# Patient Record
Sex: FEMALE | Race: BLACK OR AFRICAN AMERICAN | Employment: FULL TIME | URBAN - METROPOLITAN AREA
[De-identification: names, ages, dates, MRNs, and addresses within clinical notes are randomized per-mention and may not be internally consistent; named-entity substitution may affect disease eponyms.]

---

## 2017-11-06 ENCOUNTER — DOCUMENTATION ONLY (OUTPATIENT)
Dept: SURGERY | Age: 37
End: 2017-11-06

## 2017-11-06 ENCOUNTER — CLINICAL SUPPORT (OUTPATIENT)
Dept: SURGERY | Age: 37
End: 2017-11-06

## 2017-11-06 ENCOUNTER — OFFICE VISIT (OUTPATIENT)
Dept: SURGERY | Age: 37
End: 2017-11-06

## 2017-11-06 ENCOUNTER — HOSPITAL ENCOUNTER (OUTPATIENT)
Dept: LAB | Age: 37
Discharge: HOME | End: 2017-11-06
Payer: OTHER GOVERNMENT

## 2017-11-06 VITALS — HEIGHT: 58 IN | WEIGHT: 229 LBS | BODY MASS INDEX: 48.07 KG/M2

## 2017-11-06 VITALS
DIASTOLIC BLOOD PRESSURE: 83 MMHG | WEIGHT: 229 LBS | SYSTOLIC BLOOD PRESSURE: 130 MMHG | RESPIRATION RATE: 16 BRPM | HEIGHT: 58 IN | OXYGEN SATURATION: 100 % | BODY MASS INDEX: 48.07 KG/M2 | HEART RATE: 60 BPM

## 2017-11-06 DIAGNOSIS — K30 FUNCTIONAL DYSPEPSIA: ICD-10-CM

## 2017-11-06 DIAGNOSIS — E66.01 MORBID OBESITY WITH BODY MASS INDEX (BMI) OF 40.0 TO 49.9 (HCC): ICD-10-CM

## 2017-11-06 DIAGNOSIS — Z86.010 HISTORY OF COLON POLYPS: ICD-10-CM

## 2017-11-06 DIAGNOSIS — K59.00 CONSTIPATION, UNSPECIFIED CONSTIPATION TYPE: ICD-10-CM

## 2017-11-06 DIAGNOSIS — E66.01 MORBID OBESITY WITH BODY MASS INDEX (BMI) OF 40.0 TO 49.9 (HCC): Primary | ICD-10-CM

## 2017-11-06 DIAGNOSIS — E66.01 MORBID OBESITY (HCC): Primary | ICD-10-CM

## 2017-11-06 PROCEDURE — 83013 H PYLORI (C-13) BREATH: CPT | Performed by: SPECIALIST

## 2017-11-06 RX ORDER — BISMUTH SUBSALICYLATE 262 MG
1 TABLET,CHEWABLE ORAL DAILY
COMMUNITY
End: 2018-03-16

## 2017-11-06 RX ORDER — PSYLLIUM HUSK 0.4 G
CAPSULE ORAL
COMMUNITY
End: 2018-03-16

## 2017-11-06 RX ORDER — LANOLIN ALCOHOL/MO/W.PET/CERES
CREAM (GRAM) TOPICAL
COMMUNITY
End: 2018-03-16

## 2017-11-06 RX ORDER — POLYETHYLENE GLYCOL 3350 17 G/17G
17 POWDER, FOR SOLUTION ORAL DAILY
COMMUNITY
End: 2018-03-16

## 2017-11-06 NOTE — PROGRESS NOTES
Medical Weight Loss Multi-Disciplinary Program    Name: Tita Leal   : 1980    Session# 1  Date: 2017     Height: 4' 10\" (147.3 cm)    Weight: 103.9 kg (229 lb) lbs. Body mass index is 47.86 kg/(m^2). Pounds Lost: 0 Pounds Gained: 0    Dietary Instructions    Reviewed intake  Understanding label reading  Understanding low carbohydrates, low sugar, higher protein meals  Understanding proper portions  Dining outside home  Instruction given for personal dietary changes  Discussed perceived compliance  Comments: Pt given brief pre/post-op diet ed and diet hx reviewed. Physical Activity/Exercise    Discussed Perceived Compliance  Reasonable Goals Set  Motivation  Comments: Pt does not have an exercise routine, but is a  and walks 5 days a week the entire day     Behavior Modification    Positive attitude  Comments: Pt is working on the following goals:    Goals:   1. Work on eating three meals per day; no skipping meals - eating within 2 hours of waking up -- can use a protein shake as a meal replacement or snack   2. Work on  out eating and drinking waiting 30 minutes between each  3. Work on decreasing carbohydrates keeping it to 50 grams or less per day     Candidate for surgery (per RD): Pending    Dietitian: Lucy Ram is a 44 y.o. female who present for a pre-op evaluation. Visit Vitals    Ht 4' 10\" (1.473 m)    Wt 103.9 kg (229 lb)    BMI 47.86 kg/m2     Past Medical History:   Diagnosis Date    Constipation     Functional dyspepsia     uses OTC meds    History of colon polyps     Morbid obesity (HCC)     Morbid obesity with body mass index (BMI) of 40.0 to 49.9 (HCC)            Procedure:  laparoscopic sleeve gastrectomy     Reasons for Surgery:  BMI > 40 with one or more medically significant comorbidities    Summary:  Pt given brief pre/post-op diet ed and diet hx reviewed. Pt set several goals. See below.      Current Vitamins: MVI, Iron, Vitamin C    What have you done in the past to try to lose weight? Sarah, cabbage soup diet, taken garcenia, has done no bread and watching sugars, diet pill    Why didn't you lose weight or keep the weight off?: Pt was not able to lose the weight because she was not seeing results fast enough, so she would quit. Also thinks consistency was also a factor which made her stop each diet    Why do you think having weight loss surgery will make it possible for you to lose weight and keep it off? Feels is she can financially afford it it will make it easier for her to stay with a diet and lose the weight andmaintain that weight loss       Patient Education and Materials Provided:  Supplement [facility], B Vitamin Information, MVI Recommendations, Calcium Citrate Information, Bariatric Supplement Companies, Protein Supplement Information, Fluid Requirements, No Caffeine or Carbonation, No Alcohol for One Year Post Op, 3 Balanced Meals a Day, Food Group Guide, Good Choices Dining Out, No Snacks, No Concentrated Sweets, Support System at Home, Exercising, Support Group Information and Addressed Current Habits / Changes to make    Nutritional Hx: What is the number of meals you eat per day? Definitely 2  Comment: typically skips breakfast because she's busy     Do you eat between meals / snack? Not usually  Typical snack: N/A    How fast do you eat your meals? slow    How many sodas/sugared beverages do you drink per day? No often - If she has pasta she'll have sprite or 7-up    How many caffeinated drinks do you have per day? None     How much water do you drink per day? 3 16.9-ounce bottles     How often do you eat fast food? occasionally    How often do you consume alcohol? never;     Diet History:  Breakfast  What are you eating and how much? Typically skips breakfast    When? ii   Where? iii   Snacks  What are you eating and how much?  None    When? ii   Where? iii   Hydration  What are you eating and how much? water   When? ii   Where? ii   Lunch  What are you eating and how much? Leftovers from dinner - maybe pasta, gumbo   When? ii   Where? iii   Snacks  What are you eating and how much? None    When? ii   Where? iii   Hydration  What are you eating and how much? water   When? ii   Where? iii   Dinner  What are you eating and how much? Gumbo, pasta, spaghetti, always has a vegetable, fast food or out to dinner    When? ii   Where? iii   Snacks  What are you eating and how much? Cake, cookies    When? ii   Where? iii   Hydration  What are you eating and how much? Water or an occasional soda    When? ii   Where? iii     Exercise:  Do you currently have an exercise routine? yes  What kind of exercise do you do? walking . For how long? throughout the day (pt. is a ) For how often? 5 days per week    Goals:   1. Work on eating three meals per day; no skipping meals - eating within 2 hours of waking up -- can use a protein shake as a meal replacement or snack   2. Work on  out eating and drinking waiting 30 minutes between each  3.  Work on decreasing carbohydrates keeping it to 50 grams or less per day

## 2017-11-06 NOTE — MR AVS SNAPSHOT
Visit Information Date & Time Provider Department Dept. Phone Encounter #  
 11/6/2017 10:40 AM MD [facility] [phone] [id number] Your Appointments 11/6/2017  1:00 PM  
NUTRITION COUNSELING with TSS NUTR VISIT2 [facility] ([address]) Appt Note: 1 of 3  
 [address] Upcoming Health Maintenance Date Due DTaP/Tdap/Td series (1 - Tdap) 1/9/2000 PAP AKA CERVICAL CYTOLOGY 1/9/2000 INFLUENZA AGE 9 TO ADULT 8/1/2017 Allergies as of 11/6/2017  Review Complete On: 11/6/2017 By: Catalina Jameson MD  
  
 Severity Noted Reaction Type Reactions Morphine  11/06/2017    Hives, Itching Current Immunizations  Never Reviewed No immunizations on file. Not reviewed this visit You Were Diagnosed With   
  
 Codes Comments [facility]    -  Primary ICD-10-CM: E66.01 
ICD-9-CM: 278.01 Functional dyspepsia     ICD-10-CM: K30 ICD-9-CM: 536.8 Constipation, unspecified constipation type     ICD-10-CM: K59.00 ICD-9-CM: 564.00 Morbid obesity with body mass index (BMI) of 40.0 to 49.9 (HCC)     ICD-10-CM: E66.01 
ICD-9-CM: 278.01 History of colon polyps     ICD-10-CM: Z86.010 
ICD-9-CM: V12.72 Vitals BP Pulse Resp Height(growth percentile) Weight(growth percentile) SpO2  
 130/83 (BP 1 Location: Left arm, BP Patient Position: Sitting) 60 16 4' 10\" (1.473 m) 229 lb (103.9 kg) 100% BMI OB Status Smoking Status 47.86 kg/m2 Having regular periods Never Smoker Vitals History BMI and BSA Data Body Mass Index Body Surface Area  
 47.86 kg/m 2 2.06 m 2 Your Updated Medication List  
  
   
This list is accurate as of: 11/6/17 12:20 PM.  Always use your most recent med list.  
  
 Iron 325 mg (65 mg iron) tablet Generic drug:  ferrous sulfate Take  by mouth Daily (before breakfast). METAMUCIL 0.4 gram Cap Generic drug:  psyllium husk Take  by mouth. MIRALAX 17 gram packet Generic drug:  polyethylene glycol Take 17 g by mouth daily. multivitamin tablet Commonly known as:  ONE A DAY Take 1 Tab by mouth daily. VITAMIN C PO Take  by mouth. To-Do List   
 11/06/2017 Lab:  CBC WITH AUTOMATED DIFF   
  
 11/06/2017 Lab:  H. PYLORI BREATH TEST   
  
 11/06/2017 Lab:  METABOLIC PANEL, COMPREHENSIVE   
  
 11/06/2017 Lab:  TSH 3RD GENERATION Patient Instructions Body Mass Index: Care Instructions Your Care Instructions Body mass index (BMI) can help you see if your weight is raising your risk for health problems. It uses a formula to compare how much you weigh with how tall you are. · A BMI lower than 18.5 is considered underweight. · A BMI between 18.5 and 24.9 is considered healthy. · A BMI between 25 and 29.9 is considered overweight. A BMI of 30 or higher is considered obese. If your BMI is in the normal range, it means that you have a lower risk for weight-related health problems. If your BMI is in the overweight or obese range, you may be at increased risk for weight-related health problems, such as high blood pressure, heart disease, stroke, arthritis or joint pain, and diabetes. If your BMI is in the underweight range, you may be at increased risk for health problems such as fatigue, lower protection (immunity) against illness, muscle loss, bone loss, hair loss, and hormone problems. BMI is just one measure of your risk for weight-related health problems. You may be at higher risk for health problems if you are not active, you eat an unhealthy diet, or you drink too much alcohol or use tobacco products. Follow-up care is a key part of your treatment and safety.  Be sure to make and go to all appointments, and call your doctor if you are having problems. It's also a good idea to know your test results and keep a list of the medicines you take. How can you care for yourself at home? · Practice healthy eating habits. This includes eating plenty of fruits, vegetables, whole grains, lean protein, and low-fat dairy. · If your doctor recommends it, get more exercise. Walking is a good choice. Bit by bit, increase the amount you walk every day. Try for at least 30 minutes on most days of the week. · Do not smoke. Smoking can increase your risk for health problems. If you need help quitting, talk to your doctor about stop-smoking programs and medicines. These can increase your chances of quitting for good. · Limit alcohol to 2 drinks a day for men and 1 drink a day for women. Too much alcohol can cause health problems. If you have a BMI higher than 25 · Your doctor may do other tests to check your risk for weight-related health problems. This may include measuring the distance around your waist. A waist measurement of more than 40 inches in men or 35 inches in women can increase the risk of weight-related health problems. · Talk with your doctor about steps you can take to stay healthy or improve your health. You may need to make lifestyle changes to lose weight and stay healthy, such as changing your diet and getting regular exercise. If you have a BMI lower than 18.5 · Your doctor may do other tests to check your risk for health problems. · Talk with your doctor about steps you can take to stay healthy or improve your health. You may need to make lifestyle changes to gain or maintain weight and stay healthy, such as getting more healthy foods in your diet and doing exercises to build muscle. Where can you learn more? Go to http://jorge-prisca.info/. Enter S176 in the search box to learn more about \"Body Mass Index: Care Instructions. \" 
 Current as of: October 13, 2016 Content Version: 11.4 © 8684-4036 Healthwise, [employer]. Care instructions adapted under license by [employer] (which disclaims liability or warranty for this information). If you have questions about a medical condition or this instruction, always ask your healthcare professional. [address] any warranty or liability for your use of this information. Introducing [facility]! Barragan Virgil introduces [employer] patient portal. Now you can access parts of your medical record, email your doctor's office, and request medication refills online. 1. In your internet browser, go to [email or web address] 2. Click on the First Time User? Click Here link in the Sign In box. You will see the New Member Sign Up page. 3. Enter your [employer] Access Code exactly as it appears below. You will not need to use this code after youve completed the sign-up process. If you do not sign up before the expiration date, you must request a new code. · [employer] Access Code: [email or web address] Expires: 2/4/2018 11:20 AM 
 
4. Enter the last four digits of your Social Security Number (xxxx) and Date of Birth (mm/dd/yyyy) as indicated and click Submit. You will be taken to the next sign-up page. 5. Create a A[employer]ha ID. This will be your [employer]ha login ID and cannot be changed, so think of one that is secure and easy to remember. 6. Create a A[employer]ha password. You can change your password at any time. 7. Enter your Password Reset Question and Answer. This can be used at a later time if you forget your password. 8. Enter your e-mail address. You will receive e-mail notification when new information is available in [address] 9. Click Sign Up. You can now view and download portions of your medical record. 10. Click the Download Summary menu link to download a portable copy of your medical information. If you have questions, please visit the Frequently Asked Questions section of the [employer] website. Remember, [email or web address].me is NOT to be used for urgent needs. For medical emergencies, dial 911. Now available from your iPhone and Android! Please provide this summary of care documentation to your next provider. If you have any questions after today's visit, please call [phone].

## 2017-11-06 NOTE — PATIENT INSTRUCTIONS
Goals: 1. Work on eating three meals per day; no skipping meals - eating within 2 hours of waking up -- can use a protein shake as a meal replacement or snack   2. Work on  out eating and drinking waiting 30 minutes between each  3.  Work on decreasing carbohydrates keeping it to 50 grams or less per day

## 2017-11-06 NOTE — PATIENT INSTRUCTIONS
Body Mass Index: Care Instructions  Your Care Instructions    Body mass index (BMI) can help you see if your weight is raising your risk for health problems. It uses a formula to compare how much you weigh with how tall you are. · A BMI lower than 18.5 is considered underweight. · A BMI between 18.5 and 24.9 is considered healthy. · A BMI between 25 and 29.9 is considered overweight. A BMI of 30 or higher is considered obese. If your BMI is in the normal range, it means that you have a lower risk for weight-related health problems. If your BMI is in the overweight or obese range, you may be at increased risk for weight-related health problems, such as high blood pressure, heart disease, stroke, arthritis or joint pain, and diabetes. If your BMI is in the underweight range, you may be at increased risk for health problems such as fatigue, lower protection (immunity) against illness, muscle loss, bone loss, hair loss, and hormone problems. BMI is just one measure of your risk for weight-related health problems. You may be at higher risk for health problems if you are not active, you eat an unhealthy diet, or you drink too much alcohol or use tobacco products. Follow-up care is a key part of your treatment and safety. Be sure to make and go to all appointments, and call your doctor if you are having problems. It's also a good idea to know your test results and keep a list of the medicines you take. How can you care for yourself at home? · Practice healthy eating habits. This includes eating plenty of fruits, vegetables, whole grains, lean protein, and low-fat dairy. · If your doctor recommends it, get more exercise. Walking is a good choice. Bit by bit, increase the amount you walk every day. Try for at least 30 minutes on most days of the week. · Do not smoke. Smoking can increase your risk for health problems. If you need help quitting, talk to your doctor about stop-smoking programs and medicines. These can increase your chances of quitting for good. · Limit alcohol to 2 drinks a day for men and 1 drink a day for women. Too much alcohol can cause health problems. If you have a BMI higher than 25  · Your doctor may do other tests to check your risk for weight-related health problems. This may include measuring the distance around your waist. A waist measurement of more than 40 inches in men or 35 inches in women can increase the risk of weight-related health problems. · Talk with your doctor about steps you can take to stay healthy or improve your health. You may need to make lifestyle changes to lose weight and stay healthy, such as changing your diet and getting regular exercise. If you have a BMI lower than 18.5  · Your doctor may do other tests to check your risk for health problems. · Talk with your doctor about steps you can take to stay healthy or improve your health. You may need to make lifestyle changes to gain or maintain weight and stay healthy, such as getting more healthy foods in your diet and doing exercises to build muscle. Where can you learn more? Go to http://jorge-prisca.info/. Enter S176 in the search box to learn more about \"Body Mass Index: Care Instructions. \"  Current as of: October 13, 2016  Content Version: 11.4  © 3434-8411 Healthwise, Incorporated. Care instructions adapted under license by [employer] (which disclaims liability or warranty for this information). If you have questions about a medical condition or this instruction, always ask your healthcare professional. [address] any warranty or liability for your use of this information.

## 2017-11-06 NOTE — PROGRESS NOTES
Bariatric Surgery Consultation    Subjective: The patient is a 44 y.o. obese female with a Body mass index is 47.86 kg/(m^2). John Florence The patient is currently her heaviest weight for the past   several years. she has been overweight since her teen years. she has been considering surgery since last year. she desires surgery   at this time because of multiple health concerns and their lifestyle issues which are hindered by their weight. she has been referred by   his family physician for evaluation and treatment of their obesity via surgical intervention. Parvez Lunsford has tried multiple diets   in her lifetime most recently tried behavior modification and unsupervised diets    Bariatric comorbidities present are   Patient Active Problem List   Diagnosis Code    Morbid obesity ([facility] [address] E66.01    Morbid obesity with body mass index (BMI) of 40.0 to 49.9 ([facility]) E66.01    Constipation K59.00    Functional dyspepsia K30    History of colon polyps Z86.010       The patient is considering laparoscopic sleeve gastrectomy for surgical weight loss due to their ineffective progress with medical forms   of weight loss and the urging of their physician who cares for their primary medical issues. The patient  now presents  for consideration   for weight loss surgery understanding the benefits of this over a medical approach of weight loss as was discussed in our presentation   on weight loss surgery. They have discussed their plans both with their family and primary care physician who is in support of their pursuit   of such. The patient has had no health issues as of late and denies and gastrointestinal disturbances other than what is outlined below in   their review of symptoms. All of their prior evaluations available by both their PCP's and specialists physicians have been reviewed today   either in the Care Everywhere portal or scanned under the media tab.     I have spent a large portion of my initial consultation today reviewing the patients current dietary habits which have contributed to their health   issues and obesity. I have suggested to them personally a dietary regimen that they can initiate now to help with their status as it pertains to their weight. They   understand that the most important aspect of their journey through their weight loss endeavor will be their adherence to a new lifestyle of   healthy eating behavior. They also understand that an adherence to an exercise program will not only help with weight loss but is ultimately   important in weight maintenance. The patients goal weight is 130lb. These goals are consistent with expected outcomes of their desired operation. her Medical goals are resolution of these health issues. Patient Active Problem List    Diagnosis Date Noted    Morbid obesity ([address]     Morbid obesity with body mass index (BMI) of 40.0 to 49.9 ([facility])     Constipation     Functional dyspepsia     History of colon polyps      Past Surgical History:   Procedure Laterality Date    COLONOSCOPY      X 3    DELIVERY       X 3    HX OTHER SURGICAL      scar resection back of head    HX TUBAL LIGATION      IR URETERAL STENT PLACEMENT        Social History   Substance Use Topics    Smoking status: Never Smoker    Smokeless tobacco: Never Used    Alcohol use Yes      Family History   Problem Relation Age of Onset    Hypertension Mother     Obesity Mother     Hypertension Father       Current Outpatient Prescriptions   Medication Sig Dispense Refill    ferrous sulfate (IRON) 325 mg (65 mg iron) tablet Take  by mouth Daily (before breakfast).  ASCORBATE CALCIUM (VITAMIN C PO) Take  by mouth.  polyethylene glycol (MIRALAX) 17 gram packet Take 17 g by mouth daily.  multivitamin (ONE A DAY) tablet Take 1 Tab by mouth daily.  psyllium husk (METAMUCIL) 0.4 gram cap Take  by mouth.        Allergies   Allergen Reactions    Morphine Hives and Itching          Review of Systems:     General - No history or complaints of unexpected fever, chills, or weight loss  Head/Neck - No history or complaints of headache, diplopia, dysphagia, hearing loss  Cardiac - No history or complaints of chest pain, palpitations, murmur, or shortness of breath  Pulmonary - No history or complaints of shortness of breath, productive cough, hemoptysis  Gastrointestinal - (+) reflux, no abdominal pain, obstipation/constipation or blood per rectum  Genitourinary - No history or complaints of hematuria/dysuria, stress urinary incontinence symptoms, or renal lithiasis  Musculoskeletal - mild joint pain in their back and knees,  no muscular weakness  Hematologic - No history or complaints of bleeding disorders,  No blood transfusions  Neurologic - No history or complaints of  migraine headaches, seizure activity, syncopal episodes, TIA or stroke  Integumentary - No history or complaints of rashes, abnormal nevi, skin cancer  Gynecological - unremarkable    Objective:     Visit Vitals    /83 (BP 1 Location: Left arm, BP Patient Position: Sitting)    Pulse 60    Resp 16    Ht 4' 10\" (1.473 m)    Wt 103.9 kg (229 lb)    SpO2 100%    BMI 47.86 kg/m2       Physical Examination: General appearance - alert, well appearing, and in no distress  Mental status - alert, oriented to person, place, and time  Eyes - pupils equal and reactive, extraocular eye movements intact  Ears - bilateral TM's and external ear canals normal  Nose - normal and patent, no erythema, discharge or polyps  Mouth - mucous membranes moist, pharynx normal without lesions  Neck - supple, no significant adenopathy  Lymphatics - no palpable lymphadenopathy, no hepatosplenomegaly  Chest - clear to auscultation, no wheezes, rales or rhonchi, symmetric air entry  Heart - normal rate, regular rhythm, normal S1, S2, no murmurs, rubs, clicks or gallops  Abdomen - soft, nontender, nondistended, no masses or organomegaly  Back exam - full range of motion, no tenderness, palpable spasm or pain on motion  Neurological - alert, oriented, normal speech, no focal findings or movement disorder noted  Musculoskeletal - no joint tenderness, deformity or swelling  Extremities - peripheral pulses normal, no pedal edema, no clubbing or cyanosis  Skin - normal coloration and turgor, no rashes, no suspicious skin lesions noted    Labs:       No results found for this or any previous visit (from the past 1440 hour(s)). Assessment:     Morbid obesity with comorbidity    Plan:     laparoscopic sleeve gastrectomy    This is a 44 y.o. female with a BMI of Body mass index is 47.86 kg/(m^2). and the weight-related co-morbidties as noted below. Gualberto meets the NIH criteria for bariatric surgery based upon the BMI of Body mass index is 47.86 kg/(m^2). and multiple weight-related co-morbidties. Will Hernandez has elected laparoscopic sleeve gastrectomy as her intervention of choice for treatment of morbid obestiy through surgical means secondary to its safety profile, rapid return to work  and decreases in operative risks over gastric bypass. In the office today, following Gualberto's history and physical examination, a 30 minute discussion regarding the anatomic alterations for the laparoscopic sleeve gastrectomy was undertaken. The dietary expectations and the patient and physician dependent factors for success were thoroughly discussed, to include the need for interval follow-up and long-term dietary changes associated with success. The possible complications of the sleeve gastrectomy  were also discussed, to include;death, DVT/PE, staple line leak, bleeding, stricture formation, infection, nutritional deficiencies and sleeve dilation.   Specific weight related outcomes for success were also discussed with an emphasis on careful and close follow-up with the first year and eating behavior modification as the baseline and cyclical hunger return. The patient expressed an understanding of the above factors, and her questions were answered in their entirety. In addition, the patient attended a 1.5 hour power point seminar regarding obesity, surgical weight loss including, adjustable gastric band, gastric bypass, and sleeve gastrectomy. This discussion contrasted the different surgical techniques, mechanisms of actions and expected outcomes, and surgical and medical risks associated with each procedure. During this seminar, there was a long question and answer session where each questions was answered until there were no additional questions. Today, the patient had all of her questions answered and desires to proceed with  bariatric surgery initially choosing sleeve gastrectomy as her surgical option. Secondary Diagnoses:     Dietary Intervention  - The patient is currently scheduled to see or has been followed by a bariatric nutritionist for an attempt at preoperative weight loss as has been dictated by their insurance carrier. They will be assessed at various times during their follow up to evaluate their progress depending on the length of time that is required once again by their carrier. I have explained the importance of preoperative weight loss and the benefits regarding lower surgical risk and also assisting the patient in reaching their weight loss goal.  Finally they understand their is a physiologic benefit from the standpoint of hepatic volume reduction preoperatively.   I have reiterated the importance of a low carbohydrate and high protein regimen to achieve their stated goal.    Mild GERD -The patient understands that weight loss surgery is not a guaranteed cure for reflux disease but does understand the benefits that weight loss can have on reflux disease.  They also understand that at the time of surgery the gastroesophageal junction will be evaluated for the presence of a diaphragmatic hernia.  Hernias will be corrected always with the gastric band and sleeve gastrectomy procedures, but only on a case by case basis with the gastric bypass if it prevents our ability to perform the operation at hand, or if I feel that they would benefit long term with correction of this issue.  The patient also understands that neither weight loss surgery nor repair of a diaphragmatic hernia repair guarantees the complete cessation of the disease. They also understand there is a possibility of recurrence with a simple crural repair as is performed with these procedures. They understand they may have to continue their medications in the postoperative period. They have a good understanding that the gastric bypass procedure is better suited to total resolution of this issue and that neither the Lap Band nor sleeve gastrectomy is considered a curative procedure as it pertains to this diagnosis.         Signed By: Terrence Pineda MD     November 6, 2017

## 2017-11-08 LAB
H. PYLORI BREATH TEST: NEGATIVE
UREA BREATH TEST QL: NORMAL

## 2017-11-29 ENCOUNTER — APPOINTMENT (OUTPATIENT)
Dept: GENERAL RADIOLOGY | Age: 37
End: 2017-11-29
Attending: SPECIALIST
Payer: OTHER GOVERNMENT

## 2017-11-29 ENCOUNTER — HOSPITAL ENCOUNTER (OUTPATIENT)
Age: 37
Setting detail: OUTPATIENT SURGERY
Discharge: HOME | End: 2017-11-29
Attending: SPECIALIST | Admitting: SPECIALIST
Payer: OTHER GOVERNMENT

## 2017-11-29 VITALS
DIASTOLIC BLOOD PRESSURE: 62 MMHG | BODY MASS INDEX: 45.74 KG/M2 | HEART RATE: 64 BPM | SYSTOLIC BLOOD PRESSURE: 131 MMHG | OXYGEN SATURATION: 100 % | RESPIRATION RATE: 18 BRPM | WEIGHT: 226.9 LBS | TEMPERATURE: 97.3 F | HEIGHT: 59 IN

## 2017-11-29 DIAGNOSIS — E66.01 MORBID OBESITY (HCC): ICD-10-CM

## 2017-11-29 PROCEDURE — 76040000019: Performed by: SPECIALIST

## 2017-11-29 PROCEDURE — 74011000255 HC RX REV CODE- 255: Performed by: SPECIALIST

## 2017-11-29 PROCEDURE — 74240 X-RAY XM UPR GI TRC 1CNTRST: CPT

## 2017-11-29 NOTE — IP AVS SNAPSHOT
[address] [address] 
[phone] Patient: Joe Rangel MRN: [id number] :1980 About your hospitalization You were admitted on:  2017 You last received care in the:  THE [facility] ENDOSCOPY You were discharged on:  2017 Why you were hospitalized Your primary diagnosis was:  Not on File Things You Need To Do (next 8 weeks) Wednesday Dec 06, 2017 NUTRITION COUNSELING with [facility] NUTR VISIT2 at 10:30 AM  
Where: [facility] Gisselle Roberts ([facility]) Discharge Orders None A check lillie indicates which time of day the medication should be taken. My Medications ASK your physician about these medications Instructions Each Dose to Equal  
 Morning Noon Evening Bedtime Iron 325 mg (65 mg iron) tablet Generic drug:  ferrous sulfate Your last dose was: Your next dose is: Take  by mouth Daily (before breakfast). METAMUCIL 0.4 gram Cap Generic drug:  psyllium husk Your last dose was: Your next dose is: Take  by mouth. MIRALAX 17 gram packet Generic drug:  polyethylene glycol Your last dose was: Your next dose is: Take 17 g by mouth daily. 17 g  
    
   
   
   
  
 multivitamin tablet Commonly known as:  ONE A DAY Your last dose was: Your next dose is: Take 1 Tab by mouth daily. 1 Tab VITAMIN C PO Your last dose was: Your next dose is: Take  by mouth. Discharge Instructions None Introducing [facility]!    
 [facility] introduces [employer] patient portal. Now you can access parts of your medical record, email your doctor's office, and request medication refills online. 1. In your internet browser, go to [email or web address] 2. Click on the First Time User? Click Here link in the Sign In box. You will see the New Member Sign Up page. 3. Enter your [employer] Access Code exactly as it appears below. You will not need to use this code after youve completed the sign-up process. If you do not sign up before the expiration date, you must request a new code. · [employer] Access Code: [email or web address] Expires: 2/4/2018 11:20 AM 
 
4. Enter the last four digits of your Social Security Number (xxxx) and Date of Birth (mm/dd/yyyy) as indicated and click Submit. You will be taken to the next sign-up page. 5. Create a [employer] ID. This will be your [employer] login ID and cannot be changed, so think of one that is secure and easy to remember. 6. Create a [employer] password. You can change your password at any time. 7. Enter your Password Reset Question and Answer. This can be used at a later time if you forget your password. 8. Enter your e-mail address. You will receive e-mail notification when new information is available in [address] 9. Click Sign Up. You can now view and download portions of your medical record. 10. Click the Download Summary menu link to download a portable copy of your medical information. If you have questions, please visit the Frequently Asked Questions section of the [employer] website. Remember, [employer] is NOT to be used for urgent needs. For medical emergencies, dial 911. Now available from your iPhone and Android! Unresulted Labs-Please follow up with your PCP about these lab tests Order Current Status XR GASTROGRAFFIN UPPER GI In process Providers Seen During Your Hospitalization Provider Specialty Primary office phone Uzair Crook MD General Surgery [phone] Your Primary Care Physician (PCP) Primary Care Physician Office Phone Office Fax HERNANDEZ ** None ** ** None ** You are allergic to the following Allergen Reactions Morphine Hives Itching Recent Documentation Height Weight BMI OB Status Smoking Status 1.486 m 102.9 kg 46.61 kg/m2 Having regular periods Never Smoker Patient Belongings The following personal items are in your possession at time of discharge: 
                             
 
  
  
 Please provide this summary of care documentation to your next provider. Signatures-by signing, you are acknowledging that this After Visit Summary has been reviewed with you and you have received a copy. Patient Signature:  ____________________________________________________________ Date:  ____________________________________________________________  
  
[facility] Provider Signature:  ____________________________________________________________ Date:  ____________________________________________________________

## 2017-11-29 NOTE — PROCEDURES
Patient:Gualberto Haywood   : 1980  Medical Record [id number]            PREPROCEDURE DIAGNOSIS: This patient is preoperative for laparoscopic sleeve gastrectomyprocedure with a history of  reflux disease. POSTPROCEDURE DIAGNOSIS: This patient is preoperative for laparoscopic sleeve gastrectomyprocedure with a history of  reflux disease. PROCEDURES PERFORMED: Upper GI study with barium. ESTIMATED BLOOD LOSS: None. SPECIMENS: None. STATEMENT OF MEDICAL NECESSITY: The patient is a patient with a  longstanding history of obesity. They are now considering the laparoscopic sleeve gastrectomyprocedure as a means of surgical weight control and due to their history of reflux disease and are being assessed preoperatively for such. DESCRIPTION OF PROCEDURE: The patient was brought to the fluoroscopy unit and  was given thin barium. On swallowing of barium, they were noted to have  normal peristalsis of their esophagus. They had prompt filling of distal  esophagus with tapering into the gastroesophageal junction. There was no evidence of a hiatal hernia present. Contrast then filled the gastric cardia, fundus,body and pre pyloric region with no abnormalities noted. Contrast then exited the pylorus in normal fashion. No obstruction was noted. There was no evidence of reflux noted.     (normal anatomy)    Pavan Alatorre MD

## 2017-11-29 NOTE — IP AVS SNAPSHOT
[address] [address] 
[phone] Patient: Soumya Duckworth MRN: [id number] :1980 My Medications ASK your physician about these medications Instructions Each Dose to Equal  
 Morning Noon Evening Bedtime Iron 325 mg (65 mg iron) tablet Generic drug:  ferrous sulfate Your last dose was: Your next dose is: Take  by mouth Daily (before breakfast). METAMUCIL 0.4 gram Cap Generic drug:  psyllium husk Your last dose was: Your next dose is: Take  by mouth. MIRALAX 17 gram packet Generic drug:  polyethylene glycol Your last dose was: Your next dose is: Take 17 g by mouth daily. 17 g  
    
   
   
   
  
 multivitamin tablet Commonly known as:  ONE A DAY Your last dose was: Your next dose is: Take 1 Tab by mouth daily. 1 Tab VITAMIN C PO Your last dose was: Your next dose is: Take  by mouth.

## 2017-12-06 ENCOUNTER — CLINICAL SUPPORT (OUTPATIENT)
Dept: SURGERY | Age: 37
End: 2017-12-06

## 2017-12-06 VITALS — HEIGHT: 59 IN | WEIGHT: 230 LBS | BODY MASS INDEX: 46.37 KG/M2

## 2017-12-06 DIAGNOSIS — E66.01 MORBID OBESITY WITH BODY MASS INDEX (BMI) OF 40.0 TO 49.9 (HCC): Primary | ICD-10-CM

## 2017-12-06 NOTE — PATIENT INSTRUCTIONS
Goals: 1. Continue working on eating three meals a day, focusing on having a source of protein at all meals and snacks  2. Consistently use protein shake as a meal replacement or snack    3. For fruit, keep it to 1/4 cup or less per serving, and pair it with a protein source, or change it to yogurt, cheese, meat, etc. Can add extract to your plain non-fat greek yogurt or some splenda   4.  Continue current exercise routine of walking 4 days a week for 10-15 minutes increasing as able - so during the day find a time where you can get out 5 days a week and walk with a purpose for 30 minutes

## 2017-12-06 NOTE — PROGRESS NOTES
Medical Weight Loss Multi-Disciplinary Program    Name: Michelle Adrian   : 1980    Session# 2  Date: 2017     Height: 4' 10.5\" (148.6 cm)    Weight: 104.3 kg (230 lb) lbs. Body mass index is 47.25 kg/(m^2). Pounds Gained: 4    Dietary Instructions    Reviewed intake  Dining outside home  Instruction given for personal dietary changes  Discussed perceived compliance  Comments: Reviewed patient's past monthly diet hx. Patient is working on eating three meals a day, patient states it is going okay, during one week she averages 4 days where she eats three meals per day. Pt states its going pretty good with decreasing her carbohydrates compared to last month, keeping it to 50 grams or less per day - patient has replaced sugary items with fruit, which she will decrease this month. Physical Activity/Exercise    Reviewed Activity Log  Discussed Perceived Compliance  Reasonable Goals Set  Motivation  Comments: Patient is walking 4 days a week for 10-15 minutes     Behavior Modification    Reviewed behavior modification log  Identify obstacles to trigger change  Achieving/Rewarding goals met  Positive attitude  Discussed perceived compliance  Comments:     Goals:  1. Continue working on eating three meals a day, focusing on having a source of protein at all meals and snacks  2. Consistently use protein shake as a meal replacement or snack    3. For fruit, keep it to 1/4 cup or less per serving, and pair it with a protein source, or change it to yogurt, cheese, meat, etc. Can add extract to your plain non-fat greek yogurt or some splenda   4.  Continue current exercise routine of walking 4 days a week for 10-15 minutes increasing as able - so during the day find a time where you can get out 5 days a week and walk with a purpose for 30 minutes     Candidate for surgery (per RD): Pending     Dietitian: Radha Bauer

## 2018-01-30 ENCOUNTER — CLINICAL SUPPORT (OUTPATIENT)
Dept: SURGERY | Age: 38
End: 2018-01-30

## 2018-01-30 VITALS — HEIGHT: 59 IN | WEIGHT: 231 LBS | BODY MASS INDEX: 46.57 KG/M2

## 2018-01-30 DIAGNOSIS — E66.01 MORBID OBESITY WITH BODY MASS INDEX (BMI) OF 40.0 TO 49.9 (HCC): Primary | ICD-10-CM

## 2018-01-30 NOTE — MR AVS SNAPSHOT
[address] [facility] [address] 
 
 
 [address] [facility] [address] [facility] 
[phone] Patient: Juanita Paiz MRN: [id number] :1980 Visit Information Date & Time Provider Department Dept. Phone Encounter #  
 2018  4:30 PM TSS [address] [facility] [phone] [id number] Your Appointments 2018  4:30 PM  
NUTRITION COUNSELING with TSS NUTRI VISIT PEN [facility] ([address]) Appt Note: 3 of 3; 3 of 3  
 [id number] [facility] [address] [facility] [address]  
  
   
 [address] [facility] Upcoming Health Maintenance Date Due DTaP/Tdap/Td series (1 - Tdap) 2000 PAP AKA CERVICAL CYTOLOGY 2000 Influenza Age 5 to Adult 2017 Allergies as of 2018  Review Complete On: 2017 By: Jessica Leos Severity Noted Reaction Type Reactions Morphine  2017    Hives, Itching Current Immunizations  Never Reviewed No immunizations on file. Not reviewed this visit Vitals Height(growth percentile) Weight(growth percentile) BMI OB Status Smoking Status 4' 10.5\" (1.486 m) 231 lb (104.8 kg) 47.46 kg/m2 Having regular periods Never Smoker Vitals History BMI and BSA Data Body Mass Index Body Surface Area  
 47.46 kg/m 2 2.08 m 2 Your Updated Medication List  
  
   
This list is accurate as of: 18  4:04 PM.  Always use your most recent med list.  
  
  
  
  
 Iron 325 mg (65 mg iron) tablet Generic drug:  ferrous sulfate Take  by mouth Daily (before breakfast). METAMUCIL 0.4 gram Cap Generic drug:  psyllium husk Take  by mouth. MIRALAX 17 gram packet Generic drug:  polyethylene glycol Take 17 g by mouth daily. multivitamin tablet Commonly known as:  ONE A DAY Take 1 Tab by mouth daily. VITAMIN C PO Take  by mouth. Patient Instructions Goals: 1. Choose the Dannon Light N Fit yogurt or Triple zero, can put some nuts. 2. Could try to use an immersion  for vegetables in soups (sold at [employer]). 3. Work to decrease carbohydrate to no more than 100 carbohydrate per day, 50 would be ideal.  
4. Continue to work to eat 3 meals per day. 5. Get fit bit fixed. 6. Start doing a video or other free exercise video at home. Add 3 days per week- would do this at night after gets home from work. Continue walking at work. Introducing [facility]! Adri Martinez introduces [employer] patient portal. Now you can access parts of your medical record, email your doctor's office, and request medication refills online. 1. In your internet browser, go to [email or web address] 2. Click on the First Time User? Click Here link in the Sign In box. You will see the New Member Sign Up page. 3. Enter your [employer] Access Code exactly as it appears below. You will not need to use this code after youve completed the sign-up process. If you do not sign up before the expiration date, you must request a new code. · [employer] Access Code: [email or web address] Expires: 2/4/2018 11:20 AM 
 
4. Enter the last four digits of your Social Security Number (xxxx) and Date of Birth (mm/dd/yyyy) as indicated and click Submit. You will be taken to the next sign-up page. 5. Create a [employer] ID. This will be your [employer] login ID and cannot be changed, so think of one that is secure and easy to remember. 6. Create a [employer] password. You can change your password at any time. 7. Enter your Password Reset Question and Answer. This can be used at a later time if you forget your password. 8. Enter your e-mail address. You will receive e-mail notification when new information is available in [address]. 9. Click Sign Up. You can now view and download portions of your medical record. 10. Click the Download Summary menu link to download a portable copy of your medical information. If you have questions, please visit the Frequently Asked Questions section of the [employer] website. Remember, [employer] is NOT to be used for urgent needs. For medical emergencies, dial 911. Now available from your iPhone and Android! Please provide this summary of care documentation to your next provider. Your primary care clinician is listed as Kalyn Plaza. If you have any questions after today's visit, please call [phone].

## 2018-01-30 NOTE — PROGRESS NOTES
Medical Weight Loss Multi-Disciplinary Program    Name: Robbi Peña   : 1980    Session# 3  Date: 2018     Height: 4' 10.5\" (148.6 cm)    Weight: 104.8 kg (231 lb) lbs. Body mass index is 47.46 kg/(m^2). Pounds Gained: 1    Dietary Instructions    Reviewed intake  Understanding low carbohydrates, low sugar, higher protein meals  Understanding proper portions  Instruction given for personal dietary changes  Discussed perceived compliance  Comments: Diet hx reviewed and personal dietary changes discussed. Diet hx: B- protein shake (not sure which one), S- nuts and cranberries, L- skipped, water, S-Chobani Flips, D- cauliflower soup with chicken, water  64 oz of water daily     Physical Activity/Exercise    Discussed Perceived Compliance  Reasonable Goals Set  Motivation  Comments: Increased walking some at work- in daily life has increased with job. Goal to get fit bit fixed so can track daily steps. Also, start doing a video or other free exercise video at home. Add 3 days per week- would do this at night after gets home from work. Continue walking at work. Behavior Modification    Achieving/Rewarding goals met  Positive attitude  Discussed perceived compliance  Comments: Pt has increased activity and plans to increase more. Has done well decreasing fruit. Has not kept carbohydrate under 50 grams. She if focusing on the following this month:     1. Choose the Dannon Light N Fit yogurt or Triple zero, can put some nuts. 2. Could try to use an immersion  for vegetables in soups to help all family members like them (sold at [employer]). 3. Work to decrease carbohydrate to no more than 100 carbohydrate per day, 50 would be ideal.   4. Continue to work to eat 3 meals per day. 5. Get fit bit fixed. 6. Start doing a video or other free exercise video at home. Add 3 days per week- would do this at night after gets home from work. Continue walking at work.      Candidate for surgery (per RD): YES    Dietitian: Donna Payan RD

## 2018-01-30 NOTE — PATIENT INSTRUCTIONS
Goals: 1. Choose the Dannon Light N Fit yogurt or Triple zero, can put some nuts. 2. Could try to use an immersion  for vegetables in soups (sold at [employer]). 3. Work to decrease carbohydrate to no more than 100 carbohydrate per day, 50 would be ideal.   4. Continue to work to eat 3 meals per day. 5. Get fit bit fixed. 6. Start doing a video or other free exercise video at home. Add 3 days per week- would do this at night after gets home from work. Continue walking at work.

## 2018-02-06 ENCOUNTER — HOSPITAL ENCOUNTER (OUTPATIENT)
Dept: LAB | Age: 38
Discharge: HOME | End: 2018-02-06
Payer: OTHER GOVERNMENT

## 2018-02-06 LAB
BASOPHILS # BLD: 0 K/UL (ref 0–0.06)
BASOPHILS NFR BLD: 0 % (ref 0–2)
DIFFERENTIAL METHOD BLD: ABNORMAL
EOSINOPHIL # BLD: 0.1 K/UL (ref 0–0.4)
EOSINOPHIL NFR BLD: 3 % (ref 0–5)
ERYTHROCYTE [DISTWIDTH] IN BLOOD BY AUTOMATED COUNT: 13.1 % (ref 11.6–14.5)
HCT VFR BLD AUTO: 37 % (ref 35–45)
HGB BLD-MCNC: 11.9 G/DL (ref 12–16)
LYMPHOCYTES # BLD: 1.6 K/UL (ref 0.9–3.6)
LYMPHOCYTES NFR BLD: 40 % (ref 21–52)
MCH RBC QN AUTO: 28.5 PG (ref 24–34)
MCHC RBC AUTO-ENTMCNC: 32.2 G/DL (ref 31–37)
MCV RBC AUTO: 88.7 FL (ref 74–97)
MONOCYTES # BLD: 0.4 K/UL (ref 0.05–1.2)
MONOCYTES NFR BLD: 9 % (ref 3–10)
NEUTS SEG # BLD: 2 K/UL (ref 1.8–8)
NEUTS SEG NFR BLD: 48 % (ref 40–73)
PLATELET # BLD AUTO: 162 K/UL (ref 135–420)
PMV BLD AUTO: 9.7 FL (ref 9.2–11.8)
RBC # BLD AUTO: 4.17 M/UL (ref 4.2–5.3)
WBC # BLD AUTO: 4.1 K/UL (ref 4.6–13.2)

## 2018-02-06 PROCEDURE — 85025 COMPLETE CBC W/AUTO DIFF WBC: CPT | Performed by: SPECIALIST

## 2018-02-06 PROCEDURE — 80053 COMPREHEN METABOLIC PANEL: CPT | Performed by: SPECIALIST

## 2018-02-06 PROCEDURE — 84443 ASSAY THYROID STIM HORMONE: CPT | Performed by: SPECIALIST

## 2018-02-06 PROCEDURE — 36415 COLL VENOUS BLD VENIPUNCTURE: CPT | Performed by: SPECIALIST

## 2018-02-07 LAB
ALBUMIN SERPL-MCNC: 3.9 G/DL (ref 3.4–5)
ALBUMIN/GLOB SERPL: 1 {RATIO} (ref 0.8–1.7)
ALP SERPL-CCNC: 44 U/L (ref 45–117)
ALT SERPL-CCNC: 26 U/L (ref 13–56)
ANION GAP SERPL CALC-SCNC: 10 MMOL/L (ref 3–18)
AST SERPL-CCNC: 23 U/L (ref 15–37)
BILIRUB SERPL-MCNC: 0.2 MG/DL (ref 0.2–1)
BUN SERPL-MCNC: 12 MG/DL (ref 7–18)
BUN/CREAT SERPL: 17 (ref 12–20)
CALCIUM SERPL-MCNC: 8.9 MG/DL (ref 8.5–10.1)
CHLORIDE SERPL-SCNC: 107 MMOL/L (ref 100–108)
CO2 SERPL-SCNC: 27 MMOL/L (ref 21–32)
CREAT SERPL-MCNC: 0.69 MG/DL (ref 0.6–1.3)
GLOBULIN SER CALC-MCNC: 3.9 G/DL (ref 2–4)
GLUCOSE SERPL-MCNC: 112 MG/DL (ref 74–99)
POTASSIUM SERPL-SCNC: 3.5 MMOL/L (ref 3.5–5.5)
PROT SERPL-MCNC: 7.8 G/DL (ref 6.4–8.2)
SODIUM SERPL-SCNC: 144 MMOL/L (ref 136–145)
TSH SERPL DL<=0.05 MIU/L-ACNC: 0.3 UIU/ML (ref 0.36–3.74)

## 2018-03-08 DIAGNOSIS — E66.01 MORBID OBESITY WITH BODY MASS INDEX (BMI) OF 40.0 TO 49.9 (HCC): Primary | ICD-10-CM

## 2018-03-08 DIAGNOSIS — Z01.812 BLOOD TESTS PRIOR TO TREATMENT OR PROCEDURE: ICD-10-CM

## 2018-03-23 RX ORDER — PHENOL/SODIUM PHENOLATE
20 AEROSOL, SPRAY (ML) MUCOUS MEMBRANE DAILY
Qty: 30 TAB | Refills: 1 | Status: SHIPPED | OUTPATIENT
Start: 2018-03-23 | End: 2018-04-19

## 2018-03-23 RX ORDER — OXYCODONE AND ACETAMINOPHEN 5; 325 MG/1; MG/1
1 TABLET ORAL
Qty: 30 TAB | Refills: 0 | Status: SHIPPED | OUTPATIENT
Start: 2018-03-23 | End: 2018-04-19

## 2018-03-26 ENCOUNTER — HOSPITAL ENCOUNTER (OUTPATIENT)
Dept: PREADMISSION TESTING | Age: 38
Discharge: HOME | End: 2018-03-26
Payer: OTHER GOVERNMENT

## 2018-03-26 ENCOUNTER — OFFICE VISIT (OUTPATIENT)
Dept: SURGERY | Age: 38
End: 2018-03-26

## 2018-03-26 VITALS
BODY MASS INDEX: 46.37 KG/M2 | WEIGHT: 230 LBS | HEART RATE: 72 BPM | DIASTOLIC BLOOD PRESSURE: 94 MMHG | OXYGEN SATURATION: 100 % | RESPIRATION RATE: 16 BRPM | HEIGHT: 59 IN | SYSTOLIC BLOOD PRESSURE: 142 MMHG

## 2018-03-26 DIAGNOSIS — E66.01 MORBID OBESITY WITH BODY MASS INDEX (BMI) OF 40.0 TO 49.9 (HCC): ICD-10-CM

## 2018-03-26 DIAGNOSIS — G89.18 POST-OP PAIN: Primary | ICD-10-CM

## 2018-03-26 DIAGNOSIS — E66.01 MORBID OBESITY (HCC): Primary | ICD-10-CM

## 2018-03-26 DIAGNOSIS — Z86.010 HISTORY OF COLON POLYPS: ICD-10-CM

## 2018-03-26 DIAGNOSIS — K59.00 CONSTIPATION, UNSPECIFIED CONSTIPATION TYPE: ICD-10-CM

## 2018-03-26 DIAGNOSIS — K30 FUNCTIONAL DYSPEPSIA: ICD-10-CM

## 2018-03-26 LAB
ABO + RH BLD: NORMAL
ALBUMIN SERPL-MCNC: 3.9 G/DL (ref 3.4–5)
ALBUMIN/GLOB SERPL: 1 {RATIO} (ref 0.8–1.7)
ALP SERPL-CCNC: 51 U/L (ref 45–117)
ALT SERPL-CCNC: 36 U/L (ref 13–56)
ANION GAP SERPL CALC-SCNC: 6 MMOL/L (ref 3–18)
AST SERPL-CCNC: 27 U/L (ref 15–37)
BASOPHILS # BLD: 0 K/UL (ref 0–0.06)
BASOPHILS NFR BLD: 0 % (ref 0–2)
BILIRUB SERPL-MCNC: 0.5 MG/DL (ref 0.2–1)
BLOOD GROUP ANTIBODIES SERPL: NORMAL
BUN SERPL-MCNC: 11 MG/DL (ref 7–18)
BUN/CREAT SERPL: 17 (ref 12–20)
CALCIUM SERPL-MCNC: 9 MG/DL (ref 8.5–10.1)
CHLORIDE SERPL-SCNC: 106 MMOL/L (ref 100–108)
CO2 SERPL-SCNC: 27 MMOL/L (ref 21–32)
CREAT SERPL-MCNC: 0.65 MG/DL (ref 0.6–1.3)
DIFFERENTIAL METHOD BLD: ABNORMAL
EOSINOPHIL # BLD: 0.1 K/UL (ref 0–0.4)
EOSINOPHIL NFR BLD: 2 % (ref 0–5)
ERYTHROCYTE [DISTWIDTH] IN BLOOD BY AUTOMATED COUNT: 13.2 % (ref 11.6–14.5)
GLOBULIN SER CALC-MCNC: 4.1 G/DL (ref 2–4)
GLUCOSE SERPL-MCNC: 85 MG/DL (ref 74–99)
HCT VFR BLD AUTO: 38 % (ref 35–45)
HGB BLD-MCNC: 12.3 G/DL (ref 12–16)
LYMPHOCYTES # BLD: 1.5 K/UL (ref 0.9–3.6)
LYMPHOCYTES NFR BLD: 45 % (ref 21–52)
MCH RBC QN AUTO: 29 PG (ref 24–34)
MCHC RBC AUTO-ENTMCNC: 32.4 G/DL (ref 31–37)
MCV RBC AUTO: 89.6 FL (ref 74–97)
MONOCYTES # BLD: 0.3 K/UL (ref 0.05–1.2)
MONOCYTES NFR BLD: 7 % (ref 3–10)
NEUTS SEG # BLD: 1.5 K/UL (ref 1.8–8)
NEUTS SEG NFR BLD: 46 % (ref 40–73)
PLATELET # BLD AUTO: 195 K/UL (ref 135–420)
PMV BLD AUTO: 10 FL (ref 9.2–11.8)
POTASSIUM SERPL-SCNC: 3.9 MMOL/L (ref 3.5–5.5)
PROT SERPL-MCNC: 8 G/DL (ref 6.4–8.2)
RBC # BLD AUTO: 4.24 M/UL (ref 4.2–5.3)
SODIUM SERPL-SCNC: 139 MMOL/L (ref 136–145)
SPECIMEN EXP DATE BLD: NORMAL
WBC # BLD AUTO: 3.4 K/UL (ref 4.6–13.2)

## 2018-03-26 PROCEDURE — 85025 COMPLETE CBC W/AUTO DIFF WBC: CPT | Performed by: SPECIALIST

## 2018-03-26 PROCEDURE — 80053 COMPREHEN METABOLIC PANEL: CPT | Performed by: SPECIALIST

## 2018-03-26 PROCEDURE — 93005 ELECTROCARDIOGRAM TRACING: CPT

## 2018-03-26 PROCEDURE — 36415 COLL VENOUS BLD VENIPUNCTURE: CPT | Performed by: SPECIALIST

## 2018-03-26 PROCEDURE — 86900 BLOOD TYPING SEROLOGIC ABO: CPT | Performed by: SPECIALIST

## 2018-03-26 NOTE — PATIENT INSTRUCTIONS
Body Mass Index: Care Instructions  Your Care Instructions    Body mass index (BMI) can help you see if your weight is raising your risk for health problems. It uses a formula to compare how much you weigh with how tall you are. · A BMI lower than 18.5 is considered underweight. · A BMI between 18.5 and 24.9 is considered healthy. · A BMI between 25 and 29.9 is considered overweight. A BMI of 30 or higher is considered obese. If your BMI is in the normal range, it means that you have a lower risk for weight-related health problems. If your BMI is in the overweight or obese range, you may be at increased risk for weight-related health problems, such as high blood pressure, heart disease, stroke, arthritis or joint pain, and diabetes. If your BMI is in the underweight range, you may be at increased risk for health problems such as fatigue, lower protection (immunity) against illness, muscle loss, bone loss, hair loss, and hormone problems. BMI is just one measure of your risk for weight-related health problems. You may be at higher risk for health problems if you are not active, you eat an unhealthy diet, or you drink too much alcohol or use tobacco products. Follow-up care is a key part of your treatment and safety. Be sure to make and go to all appointments, and call your doctor if you are having problems. It's also a good idea to know your test results and keep a list of the medicines you take. How can you care for yourself at home? · Practice healthy eating habits. This includes eating plenty of fruits, vegetables, whole grains, lean protein, and low-fat dairy. · If your doctor recommends it, get more exercise. Walking is a good choice. Bit by bit, increase the amount you walk every day. Try for at least 30 minutes on most days of the week. · Do not smoke. Smoking can increase your risk for health problems. If you need help quitting, talk to your doctor about stop-smoking programs and medicines. These can increase your chances of quitting for good. · Limit alcohol to 2 drinks a day for men and 1 drink a day for women. Too much alcohol can cause health problems. If you have a BMI higher than 25  · Your doctor may do other tests to check your risk for weight-related health problems. This may include measuring the distance around your waist. A waist measurement of more than 40 inches in men or 35 inches in women can increase the risk of weight-related health problems. · Talk with your doctor about steps you can take to stay healthy or improve your health. You may need to make lifestyle changes to lose weight and stay healthy, such as changing your diet and getting regular exercise. If you have a BMI lower than 18.5  · Your doctor may do other tests to check your risk for health problems. · Talk with your doctor about steps you can take to stay healthy or improve your health. You may need to make lifestyle changes to gain or maintain weight and stay healthy, such as getting more healthy foods in your diet and doing exercises to build muscle. Where can you learn more? Go to http://jorge-prisca.info/. Enter S176 in the search box to learn more about \"Body Mass Index: Care Instructions. \"  Current as of: October 13, 2016  Content Version: 11.4  © 1317-7381 [employer]. Care instructions adapted under license by [employer] (which disclaims liability or warranty for this information). If you have questions about a medical condition or this instruction, always ask your healthcare professional. [address] any warranty or liability for your use of this information.

## 2018-03-26 NOTE — PROGRESS NOTES
Appears to have a good understanding of the diet progression, food choices, and dietary/exercise habits for successful weight loss and nourishment after surgery. The class material included: post-op diet progression, including liquid, pureed, and low fat, low sugar food recommendations; proper food group choices, and encouraging dietary and exercise habits that lead to weight loss success.      Luz West RD

## 2018-03-26 NOTE — PROGRESS NOTES
Gastric Bypass - History and Physical    Subjective: The patient is a 43 y.o. obese female with a Body mass index is 47.25 kg/(m^2). .   she presents now to review their work up to date to see if they are a candidate for surgery and whether or not to proceed with the previously requested procedure. Bariatric comorbidities continue to include:   Patient Active Problem List   Diagnosis Code    Morbid obesity ([facility] [address] E66.01    Morbid obesity with body mass index (BMI) of 40.0 to 49.9 (HCC) E66.01    Constipation K59.00    Functional dyspepsia K30    History of colon polyps Z86.010       They have been generally well prior to this visit and have had no recent significant illnesses. The patient has had no gastrointestinal issues that would preclude them from proceeding with the surgery they have chosen. Abdullahi Reagan has recently tried a preoperative weight loss program  in addition to seeing a bariatric nutritionist preoperatively. We have discussed on at least one other occasion about the various types of surgical weight loss procedures and they have considered these options after our initial consultation. We have once again discussed these procedures in detail and they have now decided on a surgical procedure. They present today to discuss this and confirm that their evaluation pre operatively is acceptable to continue with surgery. The patient desires laparoscopic gastric bypass surgery for surgical weight loss.       The patients goal weight is 125 lb. (this represents a BMI of 27)      Patient Active Problem List    Diagnosis Date Noted    Morbid obesity ([facility] [address]     Morbid obesity with body mass index (BMI) of 40.0 to 49.9 (HCC)     Constipation     Functional dyspepsia     History of colon polyps       Past Surgical History:   Procedure Laterality Date    COLONOSCOPY      X 3    DELIVERY       X 3    HX OTHER SURGICAL      scar resection back of head    HX TUBAL LIGATION  IR URETERAL STENT PLACEMENT        Social History   Substance Use Topics    Smoking status: Never Smoker    Smokeless tobacco: Never Used    Alcohol use Yes      Comment: rare      Family History   Problem Relation Age of Onset    Hypertension Mother     Obesity Mother     Hypertension Father       Current Outpatient Prescriptions   Medication Sig Dispense Refill    oxyCODONE-acetaminophen (PERCOCET) 5-325 mg per tablet Take 1 Tab by mouth every four (4) hours as needed for Pain. Max Daily Amount: 6 Tabs. 30 Tab 0    Omeprazole delayed release (PRILOSEC D/R) 20 mg tablet Take 1 Tab by mouth daily.  OTC 30 Tab 1     Allergies   Allergen Reactions    Morphine Hives and Itching          Review of Systems:        General - No history or complaints of unexpected fever, chills, or weight loss  Head/Neck - No history or complaints of headache, diplopia, dysphagia, hearing loss  Cardiac - No history or complaints of chest pain, palpitations, murmur, or shortness of breath  Pulmonary - No history or complaints of shortness of breath, productive cough, hemoptysis  Gastrointestinal - (+) reflux, obstipation/constipation or blood per rectum  Genitourinary - No history or complaints of hematuria/dysuria, stress urinary incontinence symptoms, or renal lithiasis  Musculoskeletal - mild joint pain in their back and knees,  no muscular weakness  Hematologic - No history or complaints of bleeding disorders,  No blood transfusions  Neurologic - No history or complaints of  migraine headaches, seizure activity, syncopal episodes, TIA or stroke  Integumentary - No history or complaints of rashes, abnormal nevi, skin cancer  Gynecological - unremarkable    Objective:     Visit Vitals    BP (!) 142/94 (BP 1 Location: Left arm, BP Patient Position: Sitting)    Pulse 72    Resp 16    Ht 4' 10.5\" (1.486 m)    Wt 104.3 kg (230 lb)    SpO2 100%    BMI 47.25 kg/m2     Physical Examination: General appearance - alert, well appearing, and in no distress  Mental status - alert, oriented to person, place, and time  Eyes - pupils equal and reactive, extraocular eye movements intact  Ears - bilateral TM's and external ear canals normal  Nose - normal and patent, no erythema, discharge or polyps  Mouth - mucous membranes moist, pharynx normal without lesions  Neck - supple, no significant adenopathy  Lymphatics - no palpable lymphadenopathy, no hepatosplenomegaly  Chest - clear to auscultation, no wheezes, rales or rhonchi, symmetric air entry  Heart - normal rate, regular rhythm, normal S1, S2, no murmurs, rubs, clicks or gallops  Abdomen - soft, nontender, nondistended, no masses or organomegaly  Back exam - full range of motion, no tenderness, palpable spasm or pain on motion  Neurological - alert, oriented, normal speech, no focal findings or movement disorder noted  Musculoskeletal - no joint tenderness, deformity or swelling  Extremities - peripheral pulses normal, no pedal edema, no clubbing or cyanosis  Skin - normal coloration and turgor, no rashes, no suspicious skin lesions noted    Labs / Preoperative Evaluations:     Recent Results (from the past 1008 hour(s))   EKG, 12 LEAD, INITIAL    Collection Time: 03/26/18 12:25 PM   Result Value Ref Range    Ventricular Rate 51 BPM    Atrial Rate 51 BPM    P-R Interval 152 ms    QRS Duration 76 ms    Q-T Interval 458 ms    QTC Calculation (Bezet) 422 ms    Calculated P Axis 64 degrees    Calculated R Axis 68 degrees    Calculated T Axis 46 degrees    Diagnosis       Sinus bradycardia  Otherwise normal ECG  No previous ECGs available     CBC WITH AUTOMATED DIFF    Collection Time: 03/26/18 12:31 PM   Result Value Ref Range    WBC 3.4 (L) 4.6 - 13.2 K/uL    RBC 4.24 4.20 - 5.30 M/uL    HGB 12.3 12.0 - 16.0 g/dL    HCT 38.0 35.0 - 45.0 %    MCV 89.6 74.0 - 97.0 FL    MCH 29.0 24.0 - 34.0 PG    MCHC 32.4 31.0 - 37.0 g/dL    RDW 13.2 11.6 - 14.5 %    PLATELET 574 140 - 523 K/uL    MPV 10.0 9.2 - 11.8 FL    NEUTROPHILS 46 40 - 73 %    LYMPHOCYTES 45 21 - 52 %    MONOCYTES 7 3 - 10 %    EOSINOPHILS 2 0 - 5 %    BASOPHILS 0 0 - 2 %    ABS. NEUTROPHILS 1.5 (L) 1.8 - 8.0 K/UL    ABS. LYMPHOCYTES 1.5 0.9 - 3.6 K/UL    ABS. MONOCYTES 0.3 0.05 - 1.2 K/UL    ABS. EOSINOPHILS 0.1 0.0 - 0.4 K/UL    ABS. BASOPHILS 0.0 0.0 - 0.06 K/UL    DF AUTOMATED         Assessment:     Morbid obesity with associated comorbidity    Plan:     laparoscopic gastric bypass surgery    This is a 43 y.o. female with a BMI of Body mass index is 47.25 kg/(m^2). and the weight-related co-morbidties as noted above. Gualberto meets the NIH criteria for bariatric surgery based upon the BMI of Body mass index is 47.25 kg/(m^2). and multiple weight-related co-morbidties. Shady Thornton has elected laparoscopic gastric bypass as her intervention of choice for treatment of morbid obestiy through surgical means secondary to its uniform results,  profound baseline suppression of hunger and pace at which weight is lost.    In the office today, following Gualberto's history and physical examination, a 40 minute discussion regarding the anatomic alterations for the laparoscopic gastric bypass  was undertaken. The dietary expectations and the patient  dependent factors for success were thoroughly discussed, to include the need for interval follow-up and long-term dietary changes associated with success. The possible short and long term  complications of the gastric bypass were also discussed, to include but not limited to;death, DVT/PE, staple line leak, bleeding, stricture formation, infection,internal hernia  and pouch dilation.   Specific weight related outcomes for success were also discussed with an emphasis on careful and close follow-up with the first year and Dietary behavior modification over the first years as baseline cyclical hunger returns  The patient expressed an understanding of the above factors, and her questions were answered in their entirety. In addition, the patient attended a 1.5 hour power point seminar regarding obesity, surgical weight loss including, adjustable gastric band, gastric bypass, and sleeve gastrectomy. This discussion contrasted the different surgical techniques, mechanisms of actions and expected outcomes, and surgical and medical risks associated with each procedure. During this seminar, there was a long question and answer session where each questions was answered until there were no additional questions. Today, the patient had all of her questions answered and the decision was made today that the patient's preoperative evaluation is acceptable for them  to proceed with bariatric surgery  choosing adjustable gastric bypass as her surgical option. The patient understands the plan of action    Since the patient's original consult 4.75 months ago they have been seen by their PCP for a CT scan for abdominal pain at [facility] [place] (pt reports CT was normal and gave no explanation of her pain). There has been no change to their overall medical or surgical history and they have been on no steroids in any form. She is having a gastric bypass despite requesting a sleeve at her initial consult    Secondary Diagnoses:     DVT / Pulmonary Embolus Risk - The patient is at a higher risk for post operative DVT / pulmonary embolus secondary to their morbid obese status, relative sedentary lifestyle, and impending general anesthetic. We will plan to use anticoagulation therapy pre and post operative as well as  pneumatic compression devices and encourage ambulation once on the hospital nursing floor. The need for possible at home anticoagulation therapy has also been discussed and any decision on this matter will be made during post operative evaluations.  The patient understands that their efforts at ambulation are of vital importance to reduce the risk of this complication thus placing significant burden on them as to the prevention of such issues. Signs and symptoms of DVT / PE have been discussed with the patient and they have been instructed to call the office if any these occur in the \"at home\" post op phase. Mild GERD -The patient understands that weight loss surgery is not a guaranteed cure for reflux disease but does understand the benefits that weight loss can have on reflux disease.  They also understand that at the time of surgery the gastroesophageal junction will be evaluated for the presence of a diaphragmatic hernia.  Hernias will be corrected always with the gastric band and sleeve gastrectomy procedures, but only on a case by case basis with the gastric bypass if it prevents our ability to perform the operation at hand, or if I feel that they would benefit long term with correction of this issue.  The patient also understands that neither weight loss surgery nor repair of a diaphragmatic hernia repair guarantees the complete cessation of the disease. They also understand there is a possibility of recurrence with a simple crural repair as is performed with these procedures. They understand they may have to continue their medications in the postoperative period.  They have a good understanding that the gastric bypass procedure is better suited to total resolution of this issue and that neither the Lap Band nor sleeve gastrectomy is considered a curative procedure as it pertains to this diagnosis.       Signed By: Alva Solorio MD     March 26, 2018

## 2018-03-28 LAB
ATRIAL RATE: 51 BPM
CALCULATED P AXIS, ECG09: 64 DEGREES
CALCULATED R AXIS, ECG10: 68 DEGREES
CALCULATED T AXIS, ECG11: 46 DEGREES
DIAGNOSIS, 93000: NORMAL
P-R INTERVAL, ECG05: 152 MS
Q-T INTERVAL, ECG07: 458 MS
QRS DURATION, ECG06: 76 MS
QTC CALCULATION (BEZET), ECG08: 422 MS
VENTRICULAR RATE, ECG03: 51 BPM

## 2018-04-02 ENCOUNTER — TELEPHONE (OUTPATIENT)
Dept: SURGERY | Age: 38
End: 2018-04-02

## 2018-04-04 ENCOUNTER — ANESTHESIA EVENT (OUTPATIENT)
Dept: SURGERY | Age: 38
DRG: 621 | End: 2018-04-04
Payer: OTHER GOVERNMENT

## 2018-04-05 ENCOUNTER — ANESTHESIA (OUTPATIENT)
Dept: SURGERY | Age: 38
DRG: 621 | End: 2018-04-05
Payer: OTHER GOVERNMENT

## 2018-04-05 ENCOUNTER — HOSPITAL ENCOUNTER (INPATIENT)
Age: 38
LOS: 1 days | Discharge: HOME | DRG: 621 | End: 2018-04-06
Attending: SPECIALIST | Admitting: SPECIALIST
Payer: OTHER GOVERNMENT

## 2018-04-05 PROBLEM — E66.01 MORBID OBESITY WITH BMI OF 45.0-49.9, ADULT (HCC): Status: ACTIVE | Noted: 2018-04-05

## 2018-04-05 LAB — HCG SERPL QL: NEGATIVE

## 2018-04-05 PROCEDURE — 74011000250 HC RX REV CODE- 250: Performed by: SPECIALIST

## 2018-04-05 PROCEDURE — 77030018004 HC RELD STPLR ENDOSC1 J&J -C: Performed by: SPECIALIST

## 2018-04-05 PROCEDURE — 74011000250 HC RX REV CODE- 250

## 2018-04-05 PROCEDURE — 77030003580 HC NDL INSUF VERES J&J -B: Performed by: SPECIALIST

## 2018-04-05 PROCEDURE — 77030002916 HC SUT ETHLN J&J -A: Performed by: SPECIALIST

## 2018-04-05 PROCEDURE — 0FB04ZX EXCISION OF LIVER, PERCUTANEOUS ENDOSCOPIC APPROACH, DIAGNOSTIC: ICD-10-PCS | Performed by: SPECIALIST

## 2018-04-05 PROCEDURE — 77030036598 HC CARTDRG STPL RELD ECHELON FLX J&J -D: Performed by: SPECIALIST

## 2018-04-05 PROCEDURE — 77030018836 HC SOL IRR NACL ICUM -A: Performed by: SPECIALIST

## 2018-04-05 PROCEDURE — 76010000131 HC OR TIME 2 TO 2.5 HR: Performed by: SPECIALIST

## 2018-04-05 PROCEDURE — 77030012893: Performed by: SPECIALIST

## 2018-04-05 PROCEDURE — 77030010030: Performed by: SPECIALIST

## 2018-04-05 PROCEDURE — 0DB68ZX EXCISION OF STOMACH, VIA NATURAL OR ARTIFICIAL OPENING ENDOSCOPIC, DIAGNOSTIC: ICD-10-PCS | Performed by: SPECIALIST

## 2018-04-05 PROCEDURE — 77030034850: Performed by: SPECIALIST

## 2018-04-05 PROCEDURE — 65270000029 HC RM PRIVATE

## 2018-04-05 PROCEDURE — 77030008477 HC STYL SATN SLP COVD -A: Performed by: ANESTHESIOLOGY

## 2018-04-05 PROCEDURE — 74011250637 HC RX REV CODE- 250/637: Performed by: SPECIALIST

## 2018-04-05 PROCEDURE — 77030020407 HC IV BLD WRMR ST 3M -A: Performed by: ANESTHESIOLOGY

## 2018-04-05 PROCEDURE — 77030025303 HC STPLR ENDOSC J&J -G: Performed by: SPECIALIST

## 2018-04-05 PROCEDURE — 76210000006 HC OR PH I REC 0.5 TO 1 HR: Performed by: SPECIALIST

## 2018-04-05 PROCEDURE — 77030006643: Performed by: ANESTHESIOLOGY

## 2018-04-05 PROCEDURE — 77030027876 HC STPLR ENDOSC FLX PWR J&J -G1: Performed by: SPECIALIST

## 2018-04-05 PROCEDURE — 84703 CHORIONIC GONADOTROPIN ASSAY: CPT | Performed by: ANESTHESIOLOGY

## 2018-04-05 PROCEDURE — 77030005264 HC CATH JEJU BKR BARD -D: Performed by: SPECIALIST

## 2018-04-05 PROCEDURE — 77030009426 HC FCPS BIOP ENDOSC BSC -B: Performed by: SPECIALIST

## 2018-04-05 PROCEDURE — 77030008603 HC TRCR ENDOSC EPATH J&J -C: Performed by: SPECIALIST

## 2018-04-05 PROCEDURE — 77030027138 HC INCENT SPIROMETER -A

## 2018-04-05 PROCEDURE — 77030036732 HC RELD STPLR VASC J&J -F: Performed by: SPECIALIST

## 2018-04-05 PROCEDURE — 77030002933 HC SUT MCRYL J&J -A: Performed by: SPECIALIST

## 2018-04-05 PROCEDURE — 74011250636 HC RX REV CODE- 250/636

## 2018-04-05 PROCEDURE — 77030013567 HC DRN WND RESERV BARD -A: Performed by: SPECIALIST

## 2018-04-05 PROCEDURE — 74011250636 HC RX REV CODE- 250/636: Performed by: SPECIALIST

## 2018-04-05 PROCEDURE — 77030002912 HC SUT ETHBND J&J -A: Performed by: SPECIALIST

## 2018-04-05 PROCEDURE — 77030002896 HC SUT CLP ABSRB J&J -B: Performed by: SPECIALIST

## 2018-04-05 PROCEDURE — 88305 TISSUE EXAM BY PATHOLOGIST: CPT | Performed by: SPECIALIST

## 2018-04-05 PROCEDURE — 77030008683 HC TU ET CUF COVD -A: Performed by: ANESTHESIOLOGY

## 2018-04-05 PROCEDURE — 77030022585 HC SEAL FBRN EVICEL J&J -F: Performed by: SPECIALIST

## 2018-04-05 PROCEDURE — 36415 COLL VENOUS BLD VENIPUNCTURE: CPT | Performed by: ANESTHESIOLOGY

## 2018-04-05 PROCEDURE — 0D164ZA BYPASS STOMACH TO JEJUNUM, PERCUTANEOUS ENDOSCOPIC APPROACH: ICD-10-PCS | Performed by: SPECIALIST

## 2018-04-05 PROCEDURE — 77030012407 HC DRN WND BARD -B: Performed by: SPECIALIST

## 2018-04-05 PROCEDURE — 88313 SPECIAL STAINS GROUP 2: CPT | Performed by: SPECIALIST

## 2018-04-05 PROCEDURE — 77030010515 HC APPL ENDOCLP LIG J&J -B: Performed by: SPECIALIST

## 2018-04-05 PROCEDURE — 77030020255 HC SOL INJ LR 1000ML BG: Performed by: SPECIALIST

## 2018-04-05 PROCEDURE — 77030002986 HC SUT PROL J&J -A: Performed by: SPECIALIST

## 2018-04-05 PROCEDURE — 77030002966 HC SUT PDS J&J -A: Performed by: SPECIALIST

## 2018-04-05 PROCEDURE — 88307 TISSUE EXAM BY PATHOLOGIST: CPT | Performed by: SPECIALIST

## 2018-04-05 PROCEDURE — 77030034154 HC SHR COAG HARM ACE J&J -F: Performed by: SPECIALIST

## 2018-04-05 PROCEDURE — 77030020782 HC GWN BAIR PAWS FLX 3M -B: Performed by: SPECIALIST

## 2018-04-05 PROCEDURE — 76060000035 HC ANESTHESIA 2 TO 2.5 HR: Performed by: SPECIALIST

## 2018-04-05 PROCEDURE — 77030032490 HC SLV COMPR SCD KNE COVD -B: Performed by: SPECIALIST

## 2018-04-05 PROCEDURE — 0BQT4ZZ REPAIR DIAPHRAGM, PERCUTANEOUS ENDOSCOPIC APPROACH: ICD-10-PCS | Performed by: SPECIALIST

## 2018-04-05 PROCEDURE — 0DNW4ZZ RELEASE PERITONEUM, PERCUTANEOUS ENDOSCOPIC APPROACH: ICD-10-PCS | Performed by: SPECIALIST

## 2018-04-05 PROCEDURE — 77030009851 HC PCH RTVR ENDOSC AMR -B: Performed by: SPECIALIST

## 2018-04-05 RX ORDER — ROCURONIUM BROMIDE 10 MG/ML
INJECTION, SOLUTION INTRAVENOUS AS NEEDED
Status: DISCONTINUED | OUTPATIENT
Start: 2018-04-05 | End: 2018-04-05 | Stop reason: HOSPADM

## 2018-04-05 RX ORDER — SODIUM CHLORIDE 0.9 % (FLUSH) 0.9 %
5-10 SYRINGE (ML) INJECTION AS NEEDED
Status: DISCONTINUED | OUTPATIENT
Start: 2018-04-05 | End: 2018-04-05 | Stop reason: HOSPADM

## 2018-04-05 RX ORDER — FAMOTIDINE 20 MG/50ML
20 INJECTION, SOLUTION INTRAVENOUS ONCE
Status: COMPLETED | OUTPATIENT
Start: 2018-04-05 | End: 2018-04-05

## 2018-04-05 RX ORDER — FENTANYL CITRATE 50 UG/ML
50 INJECTION, SOLUTION INTRAMUSCULAR; INTRAVENOUS AS NEEDED
Status: DISCONTINUED | OUTPATIENT
Start: 2018-04-05 | End: 2018-04-05 | Stop reason: HOSPADM

## 2018-04-05 RX ORDER — DIPHENHYDRAMINE HYDROCHLORIDE 50 MG/ML
25 INJECTION, SOLUTION INTRAMUSCULAR; INTRAVENOUS
Status: DISCONTINUED | OUTPATIENT
Start: 2018-04-05 | End: 2018-04-06 | Stop reason: HOSPADM

## 2018-04-05 RX ORDER — KETOROLAC TROMETHAMINE 30 MG/ML
INJECTION, SOLUTION INTRAMUSCULAR; INTRAVENOUS AS NEEDED
Status: DISCONTINUED | OUTPATIENT
Start: 2018-04-05 | End: 2018-04-05 | Stop reason: HOSPADM

## 2018-04-05 RX ORDER — MIDAZOLAM HYDROCHLORIDE 1 MG/ML
INJECTION, SOLUTION INTRAMUSCULAR; INTRAVENOUS AS NEEDED
Status: DISCONTINUED | OUTPATIENT
Start: 2018-04-05 | End: 2018-04-05 | Stop reason: HOSPADM

## 2018-04-05 RX ORDER — ENOXAPARIN SODIUM 100 MG/ML
40 INJECTION SUBCUTANEOUS ONCE
Status: COMPLETED | OUTPATIENT
Start: 2018-04-05 | End: 2018-04-05

## 2018-04-05 RX ORDER — ACETAMINOPHEN 10 MG/ML
1000 INJECTION, SOLUTION INTRAVENOUS ONCE
Status: COMPLETED | OUTPATIENT
Start: 2018-04-05 | End: 2018-04-05

## 2018-04-05 RX ORDER — FENTANYL CITRATE 50 UG/ML
INJECTION, SOLUTION INTRAMUSCULAR; INTRAVENOUS AS NEEDED
Status: DISCONTINUED | OUTPATIENT
Start: 2018-04-05 | End: 2018-04-05 | Stop reason: HOSPADM

## 2018-04-05 RX ORDER — FLUMAZENIL 0.1 MG/ML
0.2 INJECTION INTRAVENOUS
Status: DISCONTINUED | OUTPATIENT
Start: 2018-04-05 | End: 2018-04-05 | Stop reason: HOSPADM

## 2018-04-05 RX ORDER — KETAMINE HYDROCHLORIDE 10 MG/ML
INJECTION, SOLUTION INTRAMUSCULAR; INTRAVENOUS AS NEEDED
Status: DISCONTINUED | OUTPATIENT
Start: 2018-04-05 | End: 2018-04-05 | Stop reason: HOSPADM

## 2018-04-05 RX ORDER — LIDOCAINE HYDROCHLORIDE 20 MG/ML
INJECTION, SOLUTION EPIDURAL; INFILTRATION; INTRACAUDAL; PERINEURAL AS NEEDED
Status: DISCONTINUED | OUTPATIENT
Start: 2018-04-05 | End: 2018-04-05 | Stop reason: HOSPADM

## 2018-04-05 RX ORDER — DEXMEDETOMIDINE HYDROCHLORIDE 4 UG/ML
INJECTION, SOLUTION INTRAVENOUS AS NEEDED
Status: DISCONTINUED | OUTPATIENT
Start: 2018-04-05 | End: 2018-04-05 | Stop reason: HOSPADM

## 2018-04-05 RX ORDER — SODIUM CHLORIDE, SODIUM LACTATE, POTASSIUM CHLORIDE, CALCIUM CHLORIDE 600; 310; 30; 20 MG/100ML; MG/100ML; MG/100ML; MG/100ML
125 INJECTION, SOLUTION INTRAVENOUS CONTINUOUS
Status: DISCONTINUED | OUTPATIENT
Start: 2018-04-05 | End: 2018-04-05

## 2018-04-05 RX ORDER — NYSTATIN 100000 [USP'U]/ML
500000 SUSPENSION ORAL
Status: COMPLETED | OUTPATIENT
Start: 2018-04-05 | End: 2018-04-05

## 2018-04-05 RX ORDER — ENOXAPARIN SODIUM 100 MG/ML
40 INJECTION SUBCUTANEOUS EVERY 12 HOURS
Status: DISCONTINUED | OUTPATIENT
Start: 2018-04-05 | End: 2018-04-06 | Stop reason: HOSPADM

## 2018-04-05 RX ORDER — ACETAMINOPHEN 10 MG/ML
1000 INJECTION, SOLUTION INTRAVENOUS EVERY 6 HOURS
Status: COMPLETED | OUTPATIENT
Start: 2018-04-05 | End: 2018-04-06

## 2018-04-05 RX ORDER — HYDROMORPHONE HYDROCHLORIDE 2 MG/ML
1 INJECTION, SOLUTION INTRAMUSCULAR; INTRAVENOUS; SUBCUTANEOUS
Status: DISCONTINUED | OUTPATIENT
Start: 2018-04-05 | End: 2018-04-06

## 2018-04-05 RX ORDER — HYDROMORPHONE HYDROCHLORIDE 2 MG/ML
0.5 INJECTION, SOLUTION INTRAMUSCULAR; INTRAVENOUS; SUBCUTANEOUS
Status: DISCONTINUED | OUTPATIENT
Start: 2018-04-05 | End: 2018-04-06

## 2018-04-05 RX ORDER — ONDANSETRON 2 MG/ML
4 INJECTION INTRAMUSCULAR; INTRAVENOUS
Status: DISCONTINUED | OUTPATIENT
Start: 2018-04-05 | End: 2018-04-06 | Stop reason: HOSPADM

## 2018-04-05 RX ORDER — GLYCOPYRROLATE 0.2 MG/ML
INJECTION INTRAMUSCULAR; INTRAVENOUS AS NEEDED
Status: DISCONTINUED | OUTPATIENT
Start: 2018-04-05 | End: 2018-04-05 | Stop reason: HOSPADM

## 2018-04-05 RX ORDER — PROPOFOL 10 MG/ML
INJECTION, EMULSION INTRAVENOUS AS NEEDED
Status: DISCONTINUED | OUTPATIENT
Start: 2018-04-05 | End: 2018-04-05 | Stop reason: HOSPADM

## 2018-04-05 RX ORDER — ENOXAPARIN SODIUM 100 MG/ML
40 INJECTION SUBCUTANEOUS DAILY
Status: DISCONTINUED | OUTPATIENT
Start: 2018-04-06 | End: 2018-04-05

## 2018-04-05 RX ORDER — METOCLOPRAMIDE HYDROCHLORIDE 5 MG/ML
INJECTION INTRAMUSCULAR; INTRAVENOUS AS NEEDED
Status: DISCONTINUED | OUTPATIENT
Start: 2018-04-05 | End: 2018-04-05 | Stop reason: HOSPADM

## 2018-04-05 RX ORDER — CEFAZOLIN SODIUM 2 G/50ML
2 SOLUTION INTRAVENOUS EVERY 8 HOURS
Status: COMPLETED | OUTPATIENT
Start: 2018-04-05 | End: 2018-04-06

## 2018-04-05 RX ORDER — DEXTROSE 50 % IN WATER (D50W) INTRAVENOUS SYRINGE
25-50 AS NEEDED
Status: DISCONTINUED | OUTPATIENT
Start: 2018-04-05 | End: 2018-04-05 | Stop reason: HOSPADM

## 2018-04-05 RX ORDER — NEOSTIGMINE METHYLSULFATE 5 MG/5 ML
SYRINGE (ML) INTRAVENOUS AS NEEDED
Status: DISCONTINUED | OUTPATIENT
Start: 2018-04-05 | End: 2018-04-05 | Stop reason: HOSPADM

## 2018-04-05 RX ORDER — CEFAZOLIN SODIUM 2 G/50ML
2 SOLUTION INTRAVENOUS ONCE
Status: COMPLETED | OUTPATIENT
Start: 2018-04-05 | End: 2018-04-05

## 2018-04-05 RX ORDER — MAGNESIUM SULFATE 100 %
4 CRYSTALS MISCELLANEOUS AS NEEDED
Status: DISCONTINUED | OUTPATIENT
Start: 2018-04-05 | End: 2018-04-05 | Stop reason: HOSPADM

## 2018-04-05 RX ORDER — BUPIVACAINE HYDROCHLORIDE AND EPINEPHRINE 2.5; 5 MG/ML; UG/ML
INJECTION, SOLUTION EPIDURAL; INFILTRATION; INTRACAUDAL; PERINEURAL AS NEEDED
Status: DISCONTINUED | OUTPATIENT
Start: 2018-04-05 | End: 2018-04-05 | Stop reason: HOSPADM

## 2018-04-05 RX ORDER — NALOXONE HYDROCHLORIDE 0.4 MG/ML
0.1 INJECTION, SOLUTION INTRAMUSCULAR; INTRAVENOUS; SUBCUTANEOUS AS NEEDED
Status: DISCONTINUED | OUTPATIENT
Start: 2018-04-05 | End: 2018-04-05 | Stop reason: HOSPADM

## 2018-04-05 RX ORDER — KETOROLAC TROMETHAMINE 30 MG/ML
30 INJECTION, SOLUTION INTRAMUSCULAR; INTRAVENOUS
Status: DISCONTINUED | OUTPATIENT
Start: 2018-04-05 | End: 2018-04-06 | Stop reason: HOSPADM

## 2018-04-05 RX ORDER — ONDANSETRON 2 MG/ML
INJECTION INTRAMUSCULAR; INTRAVENOUS AS NEEDED
Status: DISCONTINUED | OUTPATIENT
Start: 2018-04-05 | End: 2018-04-05 | Stop reason: HOSPADM

## 2018-04-05 RX ORDER — SODIUM CHLORIDE, SODIUM LACTATE, POTASSIUM CHLORIDE, CALCIUM CHLORIDE 600; 310; 30; 20 MG/100ML; MG/100ML; MG/100ML; MG/100ML
150 INJECTION, SOLUTION INTRAVENOUS CONTINUOUS
Status: DISCONTINUED | OUTPATIENT
Start: 2018-04-05 | End: 2018-04-06 | Stop reason: HOSPADM

## 2018-04-05 RX ADMIN — SODIUM CHLORIDE, SODIUM LACTATE, POTASSIUM CHLORIDE, AND CALCIUM CHLORIDE: 600; 310; 30; 20 INJECTION, SOLUTION INTRAVENOUS at 09:05

## 2018-04-05 RX ADMIN — SODIUM CHLORIDE, SODIUM LACTATE, POTASSIUM CHLORIDE, AND CALCIUM CHLORIDE 125 ML/HR: 600; 310; 30; 20 INJECTION, SOLUTION INTRAVENOUS at 06:37

## 2018-04-05 RX ADMIN — GLYCOPYRROLATE 0.2 MG: 0.2 INJECTION INTRAMUSCULAR; INTRAVENOUS at 07:27

## 2018-04-05 RX ADMIN — DEXMEDETOMIDINE HYDROCHLORIDE 8 MCG: 4 INJECTION, SOLUTION INTRAVENOUS at 08:13

## 2018-04-05 RX ADMIN — CEFAZOLIN SODIUM 2 G: 2 SOLUTION INTRAVENOUS at 07:27

## 2018-04-05 RX ADMIN — NYSTATIN 500000 UNITS: 100000 SUSPENSION ORAL at 06:29

## 2018-04-05 RX ADMIN — KETAMINE HYDROCHLORIDE 10 MG: 10 INJECTION, SOLUTION INTRAMUSCULAR; INTRAVENOUS at 08:45

## 2018-04-05 RX ADMIN — DEXMEDETOMIDINE HYDROCHLORIDE 12 MCG: 4 INJECTION, SOLUTION INTRAVENOUS at 07:43

## 2018-04-05 RX ADMIN — ENOXAPARIN SODIUM 40 MG: 40 INJECTION SUBCUTANEOUS at 18:34

## 2018-04-05 RX ADMIN — HYDROMORPHONE HYDROCHLORIDE 1 MG: 2 INJECTION, SOLUTION INTRAMUSCULAR; INTRAVENOUS; SUBCUTANEOUS at 21:20

## 2018-04-05 RX ADMIN — METOCLOPRAMIDE HYDROCHLORIDE 10 MG: 5 INJECTION INTRAMUSCULAR; INTRAVENOUS at 09:19

## 2018-04-05 RX ADMIN — Medication 5 MG: at 09:21

## 2018-04-05 RX ADMIN — SODIUM CHLORIDE, SODIUM LACTATE, POTASSIUM CHLORIDE, AND CALCIUM CHLORIDE 150 ML: 600; 310; 30; 20 INJECTION, SOLUTION INTRAVENOUS at 13:20

## 2018-04-05 RX ADMIN — PROPOFOL 200 MG: 10 INJECTION, EMULSION INTRAVENOUS at 07:35

## 2018-04-05 RX ADMIN — DEXMEDETOMIDINE HYDROCHLORIDE 2 MCG: 4 INJECTION, SOLUTION INTRAVENOUS at 07:54

## 2018-04-05 RX ADMIN — KETAMINE HYDROCHLORIDE 10 MG: 10 INJECTION, SOLUTION INTRAMUSCULAR; INTRAVENOUS at 09:16

## 2018-04-05 RX ADMIN — SODIUM CHLORIDE, SODIUM LACTATE, POTASSIUM CHLORIDE, AND CALCIUM CHLORIDE: 600; 310; 30; 20 INJECTION, SOLUTION INTRAVENOUS at 08:13

## 2018-04-05 RX ADMIN — CEFAZOLIN SODIUM 2 G: 2 SOLUTION INTRAVENOUS at 16:55

## 2018-04-05 RX ADMIN — FENTANYL CITRATE 100 MCG: 50 INJECTION, SOLUTION INTRAMUSCULAR; INTRAVENOUS at 07:32

## 2018-04-05 RX ADMIN — ACETAMINOPHEN 1000 MG: 10 INJECTION, SOLUTION INTRAVENOUS at 13:19

## 2018-04-05 RX ADMIN — GLYCOPYRROLATE 0.9 MG: 0.2 INJECTION INTRAMUSCULAR; INTRAVENOUS at 09:21

## 2018-04-05 RX ADMIN — LIDOCAINE HYDROCHLORIDE 100 MG: 20 INJECTION, SOLUTION EPIDURAL; INFILTRATION; INTRACAUDAL; PERINEURAL at 07:34

## 2018-04-05 RX ADMIN — KETOROLAC TROMETHAMINE 30 MG: 30 INJECTION, SOLUTION INTRAMUSCULAR; INTRAVENOUS at 09:18

## 2018-04-05 RX ADMIN — KETAMINE HYDROCHLORIDE 10 MG: 10 INJECTION, SOLUTION INTRAMUSCULAR; INTRAVENOUS at 08:15

## 2018-04-05 RX ADMIN — MIDAZOLAM HYDROCHLORIDE 2 MG: 1 INJECTION, SOLUTION INTRAMUSCULAR; INTRAVENOUS at 07:27

## 2018-04-05 RX ADMIN — ENOXAPARIN SODIUM 40 MG: 40 INJECTION SUBCUTANEOUS at 06:30

## 2018-04-05 RX ADMIN — KETAMINE HYDROCHLORIDE 10 MG: 10 INJECTION, SOLUTION INTRAMUSCULAR; INTRAVENOUS at 07:54

## 2018-04-05 RX ADMIN — ACETAMINOPHEN 1000 MG: 10 INJECTION, SOLUTION INTRAVENOUS at 07:51

## 2018-04-05 RX ADMIN — ROCURONIUM BROMIDE 20 MG: 10 INJECTION, SOLUTION INTRAVENOUS at 08:01

## 2018-04-05 RX ADMIN — KETAMINE HYDROCHLORIDE 20 MG: 10 INJECTION, SOLUTION INTRAMUSCULAR; INTRAVENOUS at 07:44

## 2018-04-05 RX ADMIN — HYDROMORPHONE HYDROCHLORIDE 1 MG: 2 INJECTION, SOLUTION INTRAMUSCULAR; INTRAVENOUS; SUBCUTANEOUS at 13:19

## 2018-04-05 RX ADMIN — FAMOTIDINE 20 MG: 20 INJECTION, SOLUTION INTRAVENOUS at 06:38

## 2018-04-05 RX ADMIN — HYDROMORPHONE HYDROCHLORIDE 1 MG: 2 INJECTION, SOLUTION INTRAMUSCULAR; INTRAVENOUS; SUBCUTANEOUS at 16:54

## 2018-04-05 RX ADMIN — ACETAMINOPHEN 1000 MG: 10 INJECTION, SOLUTION INTRAVENOUS at 19:59

## 2018-04-05 RX ADMIN — ONDANSETRON 4 MG: 2 INJECTION INTRAMUSCULAR; INTRAVENOUS at 07:30

## 2018-04-05 RX ADMIN — ROCURONIUM BROMIDE 50 MG: 10 INJECTION, SOLUTION INTRAVENOUS at 07:35

## 2018-04-05 NOTE — OP NOTES
OPERATIVE REPORT                           Patient:Gualberto King                 : 1980                Medical Record [id number]                  Pre-operative Diagnosis:  MORBID OBESITY,BMI 47, FATTY LIVER                Post-operative Diagnosis: MORBID OBESITY,BMI 47, FATTY LIVER                Procedure: Procedure(s):  LAPAROSCOPIC GASTRIC BYPASS (antecolic / antegastric)  LYSIS OF ADHESIONS  DIAPHRAGMATIC HERNIA REPAIR  WEDGE LIVER BIOPSY  INTRAOPERATIVE ENDOSCOPY WITH BIOPSY                Location: [facility]                Surgeon: Amy Huynh MD                Assistant:  Darrin Urbina [facility] - performed retraction of various structures, facilitated creating small bowel anastomsis, placed circular stapling cap through the stomach wall, assisted in creating the gastric pouch, fired various stapling devices, obtained hemostasis along staple lines via hemoclips, applied Eviseal,  retrieved all specimens from the abdominal cavity, closed fascial defect, and sutured incisions                    Anesthesia: General                 Specimen:  Liver Wedge  Biopsy                EBL: less than 20 cc                Additional Findings: none                      STATEMENT OF MEDICAL NECESSITY: The patient is a 43y.o.-year-old female who has had a long-standing history of obesity. She has developed health problems related to  obesity and has significant cardiac disease and came to me in consultation  for the gastric bypass procedure. she has undergone preoperative evaluation and was felt to be a reasonable candidate for surgery. I explained to the patient the risks associated with this procedure and she understood all this very clearly and did wish to proceed with operative intervention at this time period.                     OPERATIVE PROCEDURE: The patient was brought to the operating room, placed on the table in the supine position at which time period general anesthesia was administered without any difficulty. The abdomen was then prepped and draped in  The usual sterile fashion. Using a 15 blade, a 1 cm incision was made just to the left of the midline at the level of the umbilicus. A Veres needle approach was used to gain access to the peritoneal cavity which was then insufflated. The Visiport was then placed at that site insufflating the abdomen, then 4 additional trocars were placed in the usual U-shaped configuration with a subxiphoid incision being made to accommodate the [facility] retractor. On entering the abdomen, the patient was noted to have a moderate fatty liver with some evidence of nodularity throughout possibly consistent with early steatohepatitis as well as a diaphragmatic hernia that would be repaired at the end of the case. Before beginning the operation proper I used the hook cautery device to take down multiple dense areas of adhesions related to prior abdominal surgery. These adhesions were located between the omentum and the anterior abdominal wall. The adhesiolysis at the beginning of her main procedure to facilitate such took in excess of 15 minutes to achieve. No issues were noted during this portion of the procedure. I began the operation by choosing an area approximately 50 cm distal to the ligament of Treitz. I transected the small bowel using the Endo MONAE stapler with white reloads, I then divided the mesentery of the small bowel using 3 firings of the Endo MONAE stapler, which allowed for adequate mobilization to the upper abdominal region. I then measured off a 150-cm Adelfo limb bypass and placed  it in a side-to-side fashion with the afferent limb placing stay sutures in the 2 limbs of the bowel. I then created enterotomies in the 2 limbs of the bowel and placed the Endo MONAE stapler intra luminally closing it and firing  it creating the linear anastomosis.  With this anastomosis being hemostatic, the free margin of the enterotomy was then re approximated using 2-0 Ethibond suture and these sutures were then held up to facilitate closure using the stapling device. The mesenteric defect at this site was then closed  using a running 2-0 Ethibond suture. I then elected to bring the adelfo limb anterior to the transverse colon and did so in the usual fashion. The adelfo limb reached nicely to the upper abdominal region under no tension. I then placed a Baker's tube transorally in the stomach and inflated the balloon to 20 mL of saline solution and then I pulled it back towards the gastroesophageal junction. I then at this juncture dissected along the angle of His, exposing the left crura and I likewise dissected along the lesser curvature of the stomach, entering the retro gastric space. Once this space was then developed, I then marked the planned anastomosis of the pouch using a single dot of dilute methylene blue. I then removed the Baker's tube at this juncture. An anterior gastrotomy was then fashioned in the antrum of the stomach, then the cap of a 21 ILS stapler was then placed transabdominally guiding it through the gastrotomy out through the anterior gastric pouch in the area marked using a flamingo grasper and a Prolene suture attached to the cap. After retrieving the cap, a purse string suture was then placed at the base and tied securely. I then created the pouch using the powered [place] stapler with blue reloads. I used one firing along the base of the planned pouch then 3 vertical firings completing this linear 20 mL pouch. With the pouch having been created, the anterior gastrotomy was then closed using the same stapling device. I then at this juncture brought the Adelfo limb in a antecolic, antegastric fashion. It reached nicely to the level of the pouch under no tension at all. I then opened up the free end of the Adelfo limb using the Harmonic scalpel.  I guided the circular stapling device transabdominally through the left lower quadrant incision, guiding it through the enterotomy thendeploying the spear through the antimesenteric border of the bowel. It was then attached to the cap, closed and fired creating the circular anastomosis. With 2 very good tissue rings  noted within the stapling device, the free margin of the Adelfo limb was then closed using the Endo MONAE stapler with white reloads. I then over sewed the anastomosis using 2-0 Ethibond suture, then tested the pouch using dilute methylene blue noting it to be completely water tight. I then turned my attention to the diaphragmatic hernia and dissected the defect using blunt dissection. I then closed the defect using a single 2-0 Ethibond suture ensuring not to encroach upon the esophagus. I then left the operative field and proceeded to the the head of the bed and performed an intraoperative EGD. The scope was passed successfully into the gastric pouch to the level of the anastomosis. There was no bleeding noted and no leak appreciated with air insufflation. A biopsy for H. Pylori was performed and submitted to pathology. I then returned to the surgical field. At this juncture I then straightened out the Adelfo limb which  passed anterior to the transverse colon. When this was all achieved, I then turned my attention to checking all areas for hemostasis using Eviseal and Sugicel SNOW to achieve this. I then removed the liver retractor and due to its abnormal appearance  I did perform a liver wedge biopsy it to assess for fibrosis and submitted it to pathology for permanent section. I then placed a J-P drain in the upper abdominal region. I removed all trocars and closed the left lower quadrant trocar site using a transabdominal 0 PDS suture along the fascia. All skin incisions were then closed using 4-0 sutures of Monocryl and Steri-Strips and sterile dressings were applied. The patient tolerated the procedure well.                  Dona Paez M.D.

## 2018-04-05 NOTE — NURSE NAVIGATOR
Doing well. Denies nausea. Pain = 8. Tolerating ice chips. Dressings dry and intact. Adequate urinary output. SIVA output WNL. Encouraged to cough, deep breathe, and use spirometer every hour while awake. Encouraged to be out of bed ambulating this evening. Discussed diet and activity progression tomorrow after UGI.

## 2018-04-05 NOTE — ANESTHESIA POSTPROCEDURE EVALUATION
Post-Anesthesia Evaluation and Assessment    Cardiovascular Function/Vital Signs  Visit Vitals    /64    Pulse (!) 59    Temp 37.1 °C (98.7 °F)    Resp 8    Ht 4' 10.5\" (1.486 m)    Wt 102.7 kg (226 lb 5 oz)    SpO2 100%    BMI 46.49 kg/m2       Patient is status post Procedure(s):  LAPAROSCOPIC GASTRIC BYPASS, LYSIS OF ADHESIONS, DIAPHRAGMATIC HERNIA REPAIR, WEDGE LIVER BIOPSY AND INTRAOPERATIVE ENDOSCOPY WITH BIOPSY. Nausea/Vomiting: Controlled. Postoperative hydration reviewed and adequate. Pain:  Pain Scale 1: FLACC (04/05/18 1038)  Pain Intensity 1: 0 (04/05/18 1038)   Managed. Neurological Status:   Neuro (WDL): Exceptions to WDL (04/05/18 1016)   At baseline. Mental Status and Level of Consciousness: Arousable. Pulmonary Status:   O2 Device: Nasal cannula (04/05/18 1038)   Adequate oxygenation and airway patent. Complications related to anesthesia: None    Post-anesthesia assessment completed. No concerns. Patient has met all discharge requirements.     Signed By: Alfonso Mtz MD    April 5, 2018

## 2018-04-05 NOTE — PERIOP NOTES
More awake and alert having a lot of gas starting to belch, repositioned up in the bed condition stable.

## 2018-04-05 NOTE — IP AVS SNAPSHOT
[address] [place] [address] 
[phone] Patient: Rico Kirby MRN: [id number] :1980 About your hospitalization You were admitted on:  2018 You last received care in the:  [address] [place] [address] You were discharged on:  2018 Why you were hospitalized Your primary diagnosis was: Morbid Obesity With Bmi Of 45.0-49.9, Adult (Hcc) Follow-up Information Follow up With Details Comments Contact Info Nelda Sawyer MD On 2018 Follow up appointment scheduled for 2018 at 2:00 p.m. [address] [place] [address] [address] [facility] 
[phone] Phys MD Wayne   Patient can only remember the practice name and not the physician Your Scheduled Appointments   2:00 PM EDT  
POST OP with [facility] ([facility])  
 [address] [place] [address] [address] [place] [address]  
[phone] Thursday May 03, 2018  2:00 PM EDT Office Visit with [facility] ([facility])  
 [address] [place] [address] Perez [address] [place] [address]  
[phone] Thursday May 03, 2018  2:30 PM EDT Office Visit with RAYMOND NUTRI VISIT PEN [facility] ([facility])  
 [address] [place] [address] [address] [place] [address]  
[phone] Discharge Orders None A check lillie indicates which time of day the medication should be taken. My Medications START taking these medications Instructions Each Dose to Equal  
 Morning Noon Evening Bedtime  
 ondansetron 4 mg disintegrating tablet Commonly known as:  ZOFRAN ODT Your last dose was: Your next dose is: Take 1 Tab by mouth every eight (8) hours as needed for Nausea. 4 mg CONTINUE taking these medications Instructions Each Dose to Equal  
 Morning Noon Evening Bedtime Omeprazole delayed release 20 mg tablet Commonly known as:  PRILOSEC D/R Your last dose was: Your next dose is: Take 1 Tab by mouth daily. OTC  
 20 mg  
    
   
   
   
  
 oxyCODONE-acetaminophen 5-325 mg per tablet Commonly known as:  PERCOCET Your last dose was: Your next dose is: Take 1 Tab by mouth every four (4) hours as needed for Pain. Max Daily Amount: 6 Tabs. 1 Tab Where to Get Your Medications Information on where to get these meds will be given to you by the nurse or doctor. ! Ask your nurse or doctor about these medications  
  ondansetron 4 mg disintegrating tablet Opioid Education Prescription Opioids: What You Need to Know: 
 
Prescription opioids can be used to help relieve moderate-to-severe pain and are often prescribed following a surgery or injury, or for certain health conditions. These medications can be an important part of treatment but also come with serious risks. Opioids are strong pain medicines. Examples include hydrocodone, oxycodone, fentanyl, and morphine. Heroin is an example of an illegal opioid. It is important to work with your health care provider to make sure you are getting the safest, most effective care. WHAT ARE THE RISKS AND SIDE EFFECTS OF OPIOID USE? Prescription opioids carry serious risks of addiction and overdose, especially with prolonged use. An opioid overdose, often marked by slow breathing, can cause sudden death. The use of prescription opioids can have a number of side effects as well, even when taken as directed. · Tolerance-meaning you might need to take more of a medication for the same pain relief · Physical dependence-meaning you have symptoms of withdrawal when the medication is stopped. Withdrawal symptoms can include nausea, sweating, chills, diarrhea, stomach cramps, and muscle aches. Withdrawal can last up to several weeks, depending on which drug you took and how long you took it. · Increased sensitivity to pain · Constipation · Nausea, vomiting, and dry mouth · Sleepiness and dizziness · Confusion · Depression · Low levels of testosterone that can result in lower sex drive, energy, and strength · Itching and sweating RISKS ARE GREATER WITH:      
· History of drug misuse, substance use disorder, or overdose · Mental health conditions (such as depression or anxiety) · Sleep apnea · Older age (72 years or older) · Pregnancy Avoid alcohol while taking prescription opioids. Also, unless specifically advised by your health care provider, medications to avoid include: · Benzodiazepines (such as Xanax or Valium) · Muscle relaxants (such as Soma or Flexeril) · Hypnotics (such as Ambien or Lunesta) · Other prescription opioids KNOW YOUR OPTIONS Talk to your health care provider about ways to manage your pain that don't involve prescription opioids. Some of these options may actually work better and have fewer risks and side effects. Options may include: 
· Pain relievers such as acetaminophen, ibuprofen, and naproxen · Some medications that are also used for depression or seizures · Physical therapy and exercise · Counseling to help patients learn how to cope better with triggers of pain and stress. · Application of heat or cold compress · Massage therapy · Relaxation techniques Be Informed Make sure you know the name of your medication, how much and how often to take it, and its potential risks & side effects. IF YOU ARE PRESCRIBED OPIOIDS FOR PAIN: 
· Never take opioids in greater amounts or more often than prescribed. Remember the goal is not to be pain-free but to manage your pain at a tolerable level. · Follow up with your primary care provider to: · Work together to create a plan on how to manage your pain. · Talk about ways to help manage your pain that don't involve prescription opioids. · Talk about any and all concerns and side effects. · Help prevent misuse and abuse. · Never sell or share prescription opioids · Help prevent misuse and abuse. · Store prescription opioids in a secure place and out of reach of others (this may include visitors, children, friends, and family). · Safely dispose of unused/unwanted prescription opioids: Find your community drug take-back program or your pharmacy mail-back program, or flush them down the toilet, following guidance from the Food and Drug Administration (www.fda.gov/Drugs/ResourcesForYou). · Visit www.cdc.gov/drugoverdose to learn about the risks of opioid abuse and overdose. · If you believe you may be struggling with addiction, tell your health care provider and ask for guidance or call [address] [place] [address] at [phone]. Discharge Instructions [facility] Sara Payne M.D., F.A.C.S. 
[address] [place] [address]., [address], [address] [place] [address] Office: [phone]    Fax:  [phone] Discharge Instruction for Gastric Bypass Patients Diet: 
? Continue with the liquid diet until you are seen in the office. Make sure you sip fluids all day. Goal for total fluid intake is a minimum of 64 ounces per day. ? Aim for  Grams of protein every day. Occasionally protein shakes with whey protein will cause diarrhea after surgery due to newly developed lactose intolerance. If you experience this, there are other protein drinks on the market that do not use milk proteins such as whey. Activity: 
? Get up and walk at least once an hour during normal waking hours. ? Walk a minimum of 30 minutes every day for exercise. Rest when you are tired. ? Use your Incentive Spirometry (plastic breathing machine) 10 times every hour for two weeks. Take a slow steady breath in until you can not inhale anymore. ? You may shower. No baths, hot tubs or swimming. ? You may climb stairs. Take your time. ? No lifting more than 10-15 lbs. ? If you feel discomfort during an activity, rest. 
? Do not drive for 1 week or until you are off of all narcotics. Wound Care: ? Clean incisions with soap and water when in the shower. Pat dry. ? Leave steri-strips on until they fall off. ? A small amount of drainage may be present from the incisions. Contact the office if you notice an increase in drainage, an odor, increased redness, or fever > 100.5. Medications: 
? You should have received a prescription for pain medication and Prilosec prior to surgery, if not, notify Dr. Juan Zhao team. 
? For upset stomach you may take over the counter medications such as Maalox, Mylanta, or Pepto Bismol. ? Gas X works very well for bloating when eating or drinking. ? You may take Tylenol. Do not exceed 4,000mg of Tylenol in one day. Percocet has Tylenol in it, you will need to consider this when taking Tylenol and Percocet together. ? Non-aspirin based arthritis medications may be resumed. ? Take a childrens or adult chewable multivitamin. ? Milk of Magnesia will provide immediate relief of constipation. Daily use of Miralax or Benefiber may be needed if you develop chronic constipation. ? It is fine to take your usual home medications. Blood pressure medications should be continued after surgery, unless it is a diuretic. Diabetic medications can frequently be reduced very quickly after surgery. Diabetics should continue to monitor blood sugars frequently after surgery and contact the prescribing physician for any questions. Follow-Up Appointment: ? If you do not already have a follow-up appointment scheduled, contact the office in the next few days to obtain one. It is usually scheduled for 10-14 days after your surgery date. Office phone number:  [phone].  
? Call our office if you have questions, concerns or any worsening of condition. If you are not able to reach us, go to your primary care provider or the Emergency Department. [employer] Announcement We are excited to announce that we are making your provider's discharge notes available to you in Loopster. You will see these notes when they are completed and signed by the physician that discharged you from your recent hospital stay. If you have any questions or concerns about any information you see in Loopster, please call the Health Information Department where you were seen or reach out to your Primary Care Provider for more information about your plan of care. Introducing [facility]! Derrick Frost introduces Loopster patient portal. Now you can access parts of your medical record, email your doctor's office, and request medication refills online. 1. In your internet browser, go to [email or web address]. Learn with [employer] 2. Click on the First Time User? Click Here link in the Sign In box. You will see the New Member Sign Up page. 3. Enter your Loopster Access Code exactly as it appears below. You will not need to use this code after youve completed the sign-up process. If you do not sign up before the expiration date, you must request a new code. · Loopster Access Code: [email or web address] Expires: 5/7/2018  5:54 PM 
 
4. Enter the last four digits of your Social Security Number (xxxx) and Date of Birth (mm/dd/yyyy) as indicated and click Submit. You will be taken to the next sign-up page. 5. Create a Loopster ID. This will be your Loopster login ID and cannot be changed, so think of one that is secure and easy to remember. 6. Create a [employer] password. You can change your password at any time. 7. Enter your Password Reset Question and Answer. This can be used at a later time if you forget your password. 8. Enter your e-mail address. You will receive e-mail notification when new information is available in [address] 9. Click Sign Up. You can now view and download portions of your medical record. 10. Click the Download Summary menu link to download a portable copy of your medical information. If you have questions, please visit the Frequently Asked Questions section of the [employer] website. Remember, [employer] is NOT to be used for urgent needs. For medical emergencies, dial 911. Now available from your iPhone and Android! Introducing Rogerio Arroyo As a Tommy Magallanes patient, I wanted to make you aware of our electronic visit tool called Rogerio Branthiratana. [facility] 24/7 allows you to connect within minutes with a medical provider 24 hours a day, seven days a week via a mobile device or tablet or logging into a secure website from your computer. You can access Rogerio Arroyo from anywhere in the [place]. A virtual visit might be right for you when you have a simple condition and feel like you just dont want to get out of bed, or cant get away from work for an appointment, when your regular Tommy Magallanes provider is not available (evenings, weekends or holidays), or when youre out of town and need minor care. Electronic visits cost only $49 and if the Tommy Magallanes 24/7 provider determines a prescription is needed to treat your condition, one can be electronically transmitted to a nearby pharmacy*. Please take a moment to enroll today if you have not already done so. The enrollment process is free and takes just a few minutes. To enroll, please download the [employer] 24/7 ayden to your tablet or phone, or visit [email or web address] to enroll on your computer. And, as an [address] [place] [address] patient with a [employer] account, the results of your visits will be scanned into your electronic medical record and your primary care provider will be able to view the scanned results. We urge you to continue to see your regular Albert Michaud provider for your ongoing medical care. And while your primary care provider may not be the one available when you seek a Rogerio Ghotrafin virtual visit, the peace of mind you get from getting a real diagnosis real time can be priceless. For more information on Rogerio Ghotrafin, view our Frequently Asked Questions (FAQs) at [email or web address]. Sincerely, 
 
Justo Person MD 
Chief Medical Officer [employer] *:  certain medications cannot be prescribed via Rogerio Branthirafin Providers Seen During Your Hospitalization Provider Specialty Primary office phone Harish Drew MD General Surgery [phone] Your Primary Care Physician (PCP) Primary Care Physician Office Phone Office Fax OTHER, PHYS ** None ** ** None ** You are allergic to the following Allergen Reactions Morphine Hives Itching Recent Documentation Height Weight BMI OB Status Smoking Status 1.486 m 102.7 kg 46.49 kg/m2 Having regular periods Never Smoker Emergency Contacts Name Discharge Info Relation Home Work Mobile Troy King DISCHARGE CAREGIVER [3] Spouse [3]   [phone] Patient Belongings The following personal items are in your possession at time of discharge: 
  Dental Appliances: None  Visual Aid: Glasses, With patient      Home Medications: None   Jewelry: None  Clothing: Undergarments, Footwear, Socks, Shirt, Pants (wtih Stalin Monroy)    Other Valuables: Eyeglasses, Gregory Katey, Cell Phone (with Stalin Monroy) Please provide this summary of care documentation to your next provider. Signatures-by signing, you are acknowledging that this After Visit Summary has been reviewed with you and you have received a copy. Patient Signature:  ____________________________________________________________ Date:  ____________________________________________________________  
  
Connye Brod Provider Signature:  ____________________________________________________________ Date:  ____________________________________________________________

## 2018-04-05 NOTE — ROUTINE PROCESS
Shift summary:  1050: PT transferred to unit via bed with [facility]. Bedside and Verbal shift change report given to Mohit Combs (oncoming nurse) by Radu Echols RN (offgoing nurse). Report included the following information SBAR, Kardex, OR Summary, Procedure Summary, Intake/Output, MAR, Accordion and Recent Results.

## 2018-04-05 NOTE — PERIOP NOTES
Family member updated and given room assignment, per our [employer] patient family will leave and return later.

## 2018-04-05 NOTE — PERIOP NOTES
TRANSFER - IN REPORT:    Verbal report received from ORN & CRNA on [address] [facility] [address]  being received from  OR (unit) for routine post - op      Report consisted of patients Situation, Background, Assessment and   Recommendations(SBAR). Information from the following report(s) SBAR, Intake/Output and MAR was reviewed with the receiving nurse. Opportunity for questions and clarification was provided. Assessment completed upon patients arrival to unit and care assumed.

## 2018-04-05 NOTE — BRIEF OP NOTE
BRIEF OPERATIVE NOTE    Date of Procedure: 4/5/2018   Preoperative Diagnosis: MORBID OBESITY,BMI 47, FATTY LIVER  Postoperative Diagnosis: MORBID OBESITY,BMI 47, FATTY LIVER    Procedure(s):  LAPAROSCOPIC GASTRIC BYPASS (antecolic / antegastric)  LYSIS OF ADHESIONS  DIAPHRAGMATIC HERNIA REPAIR  WEDGE LIVER BIOPSY  INTRAOPERATIVE ENDOSCOPY WITH BIOPSY  Surgeon(s) and Role:     * Yoko Pedraza MD - Primary         Assistant Staff: Physician Assistant: CHIKIS Ellis      Surgical Staff:  Circ-1: Ghulam Hu RN  Circ-Relief: Daljit Walker RN  Physician Assistant: CHIKIS Ellis  Scrub Tech-1: French Michaud  Surg Asst-1: Blayne Elton  Event Time In   Incision Start 9989   Incision Close 3473     Anesthesia: General   Estimated Blood Loss: less 20 cc  Specimens:   ID Type Source Tests Collected by Time Destination   1 : GASTRIC CARDIA BIOPSY Preservative Stomach  Yoko Pedraza MD 4/5/2018 0820 Pathology   2 : [facility] LIVER BIOPSY Preservative Liver  Yoko Pedraza MD 4/5/2018 0820 Pathology      Findings: NA   Complications: NA  Implants: * No implants in log *

## 2018-04-05 NOTE — PERIOP NOTES
FLACC 0 confirmed with FRANCIS Harman. Patent airway resting quietly skin is intact, SIVA drain and patton emptied and recorded . TRANSFER - OUT REPORT:    Verbal report given to FRANCIS Zapien on Ruben  being transferred to [address] [place] [address] (unit) for routine post - op       Report consisted of patients Situation, Background, Assessment and   Recommendations(SBAR). Information from the following report(s) SBAR, Intake/Output and MAR was reviewed with the receiving nurse. Lines:   Peripheral IV 04/05/18 Right Hand (Active)   Site Assessment Clean, dry, & intact 4/5/2018 10:02 AM   Phlebitis Assessment 0 4/5/2018 10:02 AM   Infiltration Assessment 0 4/5/2018 10:02 AM   Dressing Status Clean, dry, & intact 4/5/2018 10:02 AM   Dressing Type Tape 4/5/2018 10:02 AM   Hub Color/Line Status Infusing 4/5/2018 10:02 AM   Alcohol Cap Used No 4/5/2018  6:37 AM        Opportunity for questions and clarification was provided. Patient transported with:   Registered Nurse   Jerald Bauer drain   Patton  Abdomen soft and rounded dressings clean dry and intact.   Incentive spirometer

## 2018-04-05 NOTE — INTERVAL H&P NOTE
H&P Update:  Gualberto King was seen and examined. History and physical has been reviewed. The patient has been examined.  There have been no significant clinical changes since the completion of the originally dated History and Physical.    Signed By: Lore Koo MD     April 5, 2018 7:11 AM

## 2018-04-05 NOTE — ROUTINE PROCESS
TRANSFER - IN REPORT:    Verbal report received from Socorro Ochoa RN(name) on Gualberto King  being received from [employer]) for routine post - op      Report consisted of patients Situation, Background, Assessment and   Recommendations(SBAR). Information from the following report(s) SBAR, Kardex, OR Summary, Procedure Summary, Intake/Output, MAR, Accordion and Recent Results was reviewed with the receiving nurse. Opportunity for questions and clarification was provided. Assessment completed upon patients arrival to unit and care assumed.

## 2018-04-05 NOTE — H&P (VIEW-ONLY)
Gastric Bypass - History and Physical    Subjective: The patient is a 43 y.o. obese female with a Body mass index is 47.25 kg/(m^2). .   she presents now to review their work up to date to see if they are a candidate for surgery and whether or not to proceed with the previously requested procedure. Bariatric comorbidities continue to include:   Patient Active Problem List   Diagnosis Code    [facility] ([facility] E66.01    Morbid obesity with body mass index (BMI) of 40.0 to 49.9 (HCC) E66.01    Constipation K59.00    Functional dyspepsia K30    History of colon polyps Z86.010       They have been generally well prior to this visit and have had no recent significant illnesses. The patient has had no gastrointestinal issues that would preclude them from proceeding with the surgery they have chosen. Saima Jenkins has recently tried a preoperative weight loss program  in addition to seeing a bariatric nutritionist preoperatively. We have discussed on at least one other occasion about the various types of surgical weight loss procedures and they have considered these options after our initial consultation. We have once again discussed these procedures in detail and they have now decided on a surgical procedure. They present today to discuss this and confirm that their evaluation pre operatively is acceptable to continue with surgery. The patient desires laparoscopic gastric bypass surgery for surgical weight loss.       The patients goal weight is 125 lb. (this represents a BMI of 27)      Patient Active Problem List    Diagnosis Date Noted    Morbid obesity ([facility]     Morbid obesity with body mass index (BMI) of 40.0 to 49.9 (HCC)     Constipation     Functional dyspepsia     History of colon polyps       Past Surgical History:   Procedure Laterality Date    COLONOSCOPY      X 3    DELIVERY       X 3    HX OTHER SURGICAL      scar resection back of head    HX TUBAL LIGATION  IR URETERAL STENT PLACEMENT        Social History   Substance Use Topics    Smoking status: Never Smoker    Smokeless tobacco: Never Used    Alcohol use Yes      Comment: rare      Family History   Problem Relation Age of Onset    Hypertension Mother     Obesity Mother     Hypertension Father       Current Outpatient Prescriptions   Medication Sig Dispense Refill    oxyCODONE-acetaminophen (PERCOCET) 5-325 mg per tablet Take 1 Tab by mouth every four (4) hours as needed for Pain. Max Daily Amount: 6 Tabs. 30 Tab 0    Omeprazole delayed release (PRILOSEC D/R) 20 mg tablet Take 1 Tab by mouth daily.  OTC 30 Tab 1     Allergies   Allergen Reactions    Morphine Hives and Itching          Review of Systems:        General - No history or complaints of unexpected fever, chills, or weight loss  Head/Neck - No history or complaints of headache, diplopia, dysphagia, hearing loss  Cardiac - No history or complaints of chest pain, palpitations, murmur, or shortness of breath  Pulmonary - No history or complaints of shortness of breath, productive cough, hemoptysis  Gastrointestinal - (+) reflux, obstipation/constipation or blood per rectum  Genitourinary - No history or complaints of hematuria/dysuria, stress urinary incontinence symptoms, or renal lithiasis  Musculoskeletal - mild joint pain in their back and knees,  no muscular weakness  Hematologic - No history or complaints of bleeding disorders,  No blood transfusions  Neurologic - No history or complaints of  migraine headaches, seizure activity, syncopal episodes, TIA or stroke  Integumentary - No history or complaints of rashes, abnormal nevi, skin cancer  Gynecological - unremarkable    Objective:     Visit Vitals    BP (!) 142/94 (BP 1 Location: Left arm, BP Patient Position: Sitting)    Pulse 72    Resp 16    Ht 4' 10.5\" (1.486 m)    Wt 104.3 kg (230 lb)    SpO2 100%    BMI 47.25 kg/m2     Physical Examination: General appearance - alert, well appearing, and in no distress  Mental status - alert, oriented to person, place, and time  Eyes - pupils equal and reactive, extraocular eye movements intact  Ears - bilateral TM's and external ear canals normal  Nose - normal and patent, no erythema, discharge or polyps  Mouth - mucous membranes moist, pharynx normal without lesions  Neck - supple, no significant adenopathy  Lymphatics - no palpable lymphadenopathy, no hepatosplenomegaly  Chest - clear to auscultation, no wheezes, rales or rhonchi, symmetric air entry  Heart - normal rate, regular rhythm, normal S1, S2, no murmurs, rubs, clicks or gallops  Abdomen - soft, nontender, nondistended, no masses or organomegaly  Back exam - full range of motion, no tenderness, palpable spasm or pain on motion  Neurological - alert, oriented, normal speech, no focal findings or movement disorder noted  Musculoskeletal - no joint tenderness, deformity or swelling  Extremities - peripheral pulses normal, no pedal edema, no clubbing or cyanosis  Skin - normal coloration and turgor, no rashes, no suspicious skin lesions noted    Labs / Preoperative Evaluations:     Recent Results (from the past 1008 hour(s))   EKG, 12 LEAD, INITIAL    Collection Time: 03/26/18 12:25 PM   Result Value Ref Range    Ventricular Rate 51 BPM    Atrial Rate 51 BPM    P-R Interval 152 ms    QRS Duration 76 ms    Q-T Interval 458 ms    QTC Calculation (Bezet) 422 ms    Calculated P Axis 64 degrees    Calculated R Axis 68 degrees    Calculated T Axis 46 degrees    Diagnosis       Sinus bradycardia  Otherwise normal ECG  No previous ECGs available     CBC WITH AUTOMATED DIFF    Collection Time: 03/26/18 12:31 PM   Result Value Ref Range    WBC 3.4 (L) 4.6 - 13.2 K/uL    RBC 4.24 4.20 - 5.30 M/uL    HGB 12.3 12.0 - 16.0 g/dL    HCT 38.0 35.0 - 45.0 %    MCV 89.6 74.0 - 97.0 FL    MCH 29.0 24.0 - 34.0 PG    MCHC 32.4 31.0 - 37.0 g/dL    RDW 13.2 11.6 - 14.5 %    PLATELET 460 680 - 792 K/uL    MPV 10.0 9.2 - 11.8 FL    NEUTROPHILS 46 40 - 73 %    LYMPHOCYTES 45 21 - 52 %    MONOCYTES 7 3 - 10 %    EOSINOPHILS 2 0 - 5 %    BASOPHILS 0 0 - 2 %    ABS. NEUTROPHILS 1.5 (L) 1.8 - 8.0 K/UL    ABS. LYMPHOCYTES 1.5 0.9 - 3.6 K/UL    ABS. MONOCYTES 0.3 0.05 - 1.2 K/UL    ABS. EOSINOPHILS 0.1 0.0 - 0.4 K/UL    ABS. BASOPHILS 0.0 0.0 - 0.06 K/UL    DF AUTOMATED         Assessment:     Morbid obesity with associated comorbidity    Plan:     laparoscopic gastric bypass surgery    This is a 43 y.o. female with a BMI of Body mass index is 47.25 kg/(m^2). and the weight-related co-morbidties as noted above. Gualberto meets the NIH criteria for bariatric surgery based upon the BMI of Body mass index is 47.25 kg/(m^2). and multiple weight-related co-morbidties. Allen Wheeler has elected laparoscopic gastric bypass as her intervention of choice for treatment of morbid obestiy through surgical means secondary to its uniform results,  profound baseline suppression of hunger and pace at which weight is lost.    In the office today, following Gualberto's history and physical examination, a 40 minute discussion regarding the anatomic alterations for the laparoscopic gastric bypass  was undertaken. The dietary expectations and the patient  dependent factors for success were thoroughly discussed, to include the need for interval follow-up and long-term dietary changes associated with success. The possible short and long term  complications of the gastric bypass were also discussed, to include but not limited to;death, DVT/PE, staple line leak, bleeding, stricture formation, infection,internal hernia  and pouch dilation.   Specific weight related outcomes for success were also discussed with an emphasis on careful and close follow-up with the first year and Dietary behavior modification over the first years as baseline cyclical hunger returns  The patient expressed an understanding of the above factors, and her questions were answered in their entirety. In addition, the patient attended a 1.5 hour power point seminar regarding obesity, surgical weight loss including, adjustable gastric band, gastric bypass, and sleeve gastrectomy. This discussion contrasted the different surgical techniques, mechanisms of actions and expected outcomes, and surgical and medical risks associated with each procedure. During this seminar, there was a long question and answer session where each questions was answered until there were no additional questions. Today, the patient had all of her questions answered and the decision was made today that the patient's preoperative evaluation is acceptable for them  to proceed with bariatric surgery  choosing adjustable gastric bypass as her surgical option. The patient understands the plan of action    Since the patient's original consult 4.75 months ago they have been seen by their PCP for a CT scan for abdominal pain at [facility] [place] (pt reports CT was normal and gave no explanation of her pain). There has been no change to their overall medical or surgical history and they have been on no steroids in any form. She is having a gastric bypass despite requesting a sleeve at her initial consult    Secondary Diagnoses:     DVT / Pulmonary Embolus Risk - The patient is at a higher risk for post operative DVT / pulmonary embolus secondary to their morbid obese status, relative sedentary lifestyle, and impending general anesthetic. We will plan to use anticoagulation therapy pre and post operative as well as  pneumatic compression devices and encourage ambulation once on the hospital nursing floor. The need for possible at home anticoagulation therapy has also been discussed and any decision on this matter will be made during post operative evaluations.  The patient understands that their efforts at ambulation are of vital importance to reduce the risk of this complication thus placing significant burden on them as to the prevention of such issues. Signs and symptoms of DVT / PE have been discussed with the patient and they have been instructed to call the office if any these occur in the \"at home\" post op phase. Mild GERD -The patient understands that weight loss surgery is not a guaranteed cure for reflux disease but does understand the benefits that weight loss can have on reflux disease.  They also understand that at the time of surgery the gastroesophageal junction will be evaluated for the presence of a diaphragmatic hernia.  Hernias will be corrected always with the gastric band and sleeve gastrectomy procedures, but only on a case by case basis with the gastric bypass if it prevents our ability to perform the operation at hand, or if I feel that they would benefit long term with correction of this issue.  The patient also understands that neither weight loss surgery nor repair of a diaphragmatic hernia repair guarantees the complete cessation of the disease. They also understand there is a possibility of recurrence with a simple crural repair as is performed with these procedures. They understand they may have to continue their medications in the postoperative period.  They have a good understanding that the gastric bypass procedure is better suited to total resolution of this issue and that neither the Lap Band nor sleeve gastrectomy is considered a curative procedure as it pertains to this diagnosis.       Signed By: Nurys Chacon MD     March 26, 2018

## 2018-04-05 NOTE — PROGRESS NOTES
Chart reviewed. Pt admitted for an elective surgical procedure. Pt is independent. Please encourage ambulation. No plan of care needs identified. Anticipate pt will be medically stable for discharge within the next 24-48 hours. CM available to assist as needed. Discharge Reassessment Plan:  Low Risk    RRAT Score:  1 - 12     Low Risk Care Transition Interventions:  1. Discharge transition plan:  Physician follow up   2. Involved patient/caregiver in assessment, planning, education and implement of intervention. 3. CM daily patient care huddles/interdisciplinary rounds were completed. 4. PCP/Specialist appointment within 5 days made prior to discharge. Date/Time  5. Facilitated transportation and logistics for follow-up appointments. 6. Handoff to [facility] Nurse Navigator or PCP practice. Care Management Interventions  Mode of Transport at Discharge:  Other (see comment) (spouse)  Transition of Care Consult (CM Consult): Discharge Planning  Health Maintenance Reviewed: Yes  Current Support Network: Lives with Spouse  Confirm Follow Up Transport: Self  Discharge Location  Discharge Placement: Home with family assistance

## 2018-04-05 NOTE — ANESTHESIA PREPROCEDURE EVALUATION
Anesthetic History   No history of anesthetic complications            Review of Systems / Medical History  Patient summary reviewed, nursing notes reviewed and pertinent labs reviewed    Pulmonary  Within defined limits                 Neuro/Psych   Within defined limits           Cardiovascular                  Exercise tolerance: >4 METS     GI/Hepatic/Renal  Within defined limits              Endo/Other        Morbid obesity     Other Findings              Physical Exam    Airway  Mallampati: II  TM Distance: 4 - 6 cm  Neck ROM: normal range of motion   Mouth opening: Normal     Cardiovascular    Rhythm: regular  Rate: normal         Dental  No notable dental hx       Pulmonary  Breath sounds clear to auscultation               Abdominal  GI exam deferred       Other Findings            Anesthetic Plan    ASA: 3  Anesthesia type: general          Induction: Intravenous  Anesthetic plan and risks discussed with: Patient

## 2018-04-06 ENCOUNTER — APPOINTMENT (OUTPATIENT)
Dept: GENERAL RADIOLOGY | Age: 38
DRG: 621 | End: 2018-04-06
Attending: SPECIALIST
Payer: OTHER GOVERNMENT

## 2018-04-06 VITALS
HEART RATE: 63 BPM | OXYGEN SATURATION: 100 % | BODY MASS INDEX: 45.62 KG/M2 | HEIGHT: 59 IN | SYSTOLIC BLOOD PRESSURE: 135 MMHG | WEIGHT: 226.31 LBS | RESPIRATION RATE: 20 BRPM | DIASTOLIC BLOOD PRESSURE: 86 MMHG | TEMPERATURE: 99 F

## 2018-04-06 LAB
ALBUMIN SERPL-MCNC: 3 G/DL (ref 3.4–5)
ALBUMIN/GLOB SERPL: 0.8 {RATIO} (ref 0.8–1.7)
ALP SERPL-CCNC: 41 U/L (ref 45–117)
ALT SERPL-CCNC: 47 U/L (ref 13–56)
ANION GAP SERPL CALC-SCNC: 13 MMOL/L (ref 3–18)
AST SERPL-CCNC: 40 U/L (ref 15–37)
BASOPHILS # BLD: 0 K/UL (ref 0–0.06)
BASOPHILS NFR BLD: 0 % (ref 0–2)
BILIRUB SERPL-MCNC: 0.5 MG/DL (ref 0.2–1)
BUN SERPL-MCNC: 5 MG/DL (ref 7–18)
BUN/CREAT SERPL: 6 (ref 12–20)
CALCIUM SERPL-MCNC: 8.3 MG/DL (ref 8.5–10.1)
CHLORIDE SERPL-SCNC: 102 MMOL/L (ref 100–108)
CO2 SERPL-SCNC: 24 MMOL/L (ref 21–32)
CREAT SERPL-MCNC: 0.83 MG/DL (ref 0.6–1.3)
DIFFERENTIAL METHOD BLD: ABNORMAL
EOSINOPHIL # BLD: 0 K/UL (ref 0–0.4)
EOSINOPHIL NFR BLD: 0 % (ref 0–5)
ERYTHROCYTE [DISTWIDTH] IN BLOOD BY AUTOMATED COUNT: 13 % (ref 11.6–14.5)
GLOBULIN SER CALC-MCNC: 3.8 G/DL (ref 2–4)
GLUCOSE SERPL-MCNC: 117 MG/DL (ref 74–99)
HCT VFR BLD AUTO: 34.6 % (ref 35–45)
HGB BLD-MCNC: 11.4 G/DL (ref 12–16)
LYMPHOCYTES # BLD: 0.8 K/UL (ref 0.9–3.6)
LYMPHOCYTES NFR BLD: 14 % (ref 21–52)
MCH RBC QN AUTO: 28.9 PG (ref 24–34)
MCHC RBC AUTO-ENTMCNC: 32.9 G/DL (ref 31–37)
MCV RBC AUTO: 87.6 FL (ref 74–97)
MONOCYTES # BLD: 0.6 K/UL (ref 0.05–1.2)
MONOCYTES NFR BLD: 10 % (ref 3–10)
NEUTS SEG # BLD: 4.7 K/UL (ref 1.8–8)
NEUTS SEG NFR BLD: 76 % (ref 40–73)
PLATELET # BLD AUTO: 151 K/UL (ref 135–420)
PMV BLD AUTO: 10 FL (ref 9.2–11.8)
POTASSIUM SERPL-SCNC: 3.6 MMOL/L (ref 3.5–5.5)
PROT SERPL-MCNC: 6.8 G/DL (ref 6.4–8.2)
RBC # BLD AUTO: 3.95 M/UL (ref 4.2–5.3)
SODIUM SERPL-SCNC: 139 MMOL/L (ref 136–145)
WBC # BLD AUTO: 6.2 K/UL (ref 4.6–13.2)

## 2018-04-06 PROCEDURE — 85025 COMPLETE CBC W/AUTO DIFF WBC: CPT | Performed by: SPECIALIST

## 2018-04-06 PROCEDURE — 74011636320 HC RX REV CODE- 636/320: Performed by: SPECIALIST

## 2018-04-06 PROCEDURE — 74011250636 HC RX REV CODE- 250/636: Performed by: SPECIALIST

## 2018-04-06 PROCEDURE — 36415 COLL VENOUS BLD VENIPUNCTURE: CPT | Performed by: SPECIALIST

## 2018-04-06 PROCEDURE — 74240 X-RAY XM UPR GI TRC 1CNTRST: CPT

## 2018-04-06 PROCEDURE — 80053 COMPREHEN METABOLIC PANEL: CPT | Performed by: SPECIALIST

## 2018-04-06 PROCEDURE — 74011250637 HC RX REV CODE- 250/637: Performed by: SPECIALIST

## 2018-04-06 RX ORDER — ONDANSETRON 4 MG/1
4 TABLET, ORALLY DISINTEGRATING ORAL
Qty: 30 TAB | Refills: 1 | Status: SHIPPED | OUTPATIENT
Start: 2018-04-06 | End: 2018-04-19

## 2018-04-06 RX ORDER — OXYCODONE AND ACETAMINOPHEN 5; 325 MG/1; MG/1
1 TABLET ORAL
Status: DISCONTINUED | OUTPATIENT
Start: 2018-04-06 | End: 2018-04-06 | Stop reason: HOSPADM

## 2018-04-06 RX ADMIN — HYDROMORPHONE HYDROCHLORIDE 1 MG: 2 INJECTION, SOLUTION INTRAMUSCULAR; INTRAVENOUS; SUBCUTANEOUS at 08:53

## 2018-04-06 RX ADMIN — HYDROMORPHONE HYDROCHLORIDE 1 MG: 2 INJECTION, SOLUTION INTRAMUSCULAR; INTRAVENOUS; SUBCUTANEOUS at 01:49

## 2018-04-06 RX ADMIN — IOHEXOL 100 ML: 240 INJECTION, SOLUTION INTRATHECAL; INTRAVASCULAR; INTRAVENOUS; ORAL at 09:54

## 2018-04-06 RX ADMIN — ONDANSETRON 4 MG: 2 INJECTION INTRAMUSCULAR; INTRAVENOUS at 01:49

## 2018-04-06 RX ADMIN — ENOXAPARIN SODIUM 40 MG: 40 INJECTION SUBCUTANEOUS at 06:00

## 2018-04-06 RX ADMIN — CEFAZOLIN SODIUM 2 G: 2 SOLUTION INTRAVENOUS at 00:48

## 2018-04-06 RX ADMIN — HYDROMORPHONE HYDROCHLORIDE 1 MG: 2 INJECTION, SOLUTION INTRAMUSCULAR; INTRAVENOUS; SUBCUTANEOUS at 05:56

## 2018-04-06 RX ADMIN — OXYCODONE HYDROCHLORIDE AND ACETAMINOPHEN 1 TABLET: 5; 325 TABLET ORAL at 13:30

## 2018-04-06 RX ADMIN — ACETAMINOPHEN 1000 MG: 10 INJECTION, SOLUTION INTRAVENOUS at 01:49

## 2018-04-06 NOTE — PROGRESS NOTES
Bariatric Surgery                POD #1    Visit Vitals    /72 (BP 1 Location: Left arm, BP Patient Position: At rest)    Pulse 63    Temp 99.3 °F (37.4 °C)    Resp 18    Ht 4' 10.5\" (1.486 m)    Wt 102.7 kg (226 lb 5 oz)    LMP 03/23/2018    SpO2 98%    BMI 46.49 kg/m2     Patient has minimal complaints of pain, minimal nausea noted, she states that she feels like the lower lobes of her lungs have collapsed. She was using her IS when I entered the room. Exam:  Appears well in no distress  Lungs- clear bilaterally  Abd - soft, incisions look good without erythema           SIVA with minimal serosanguinous output  Extremities- no new edema or swelling    UGI - pending    Data Review:    Labs: Results:       Chemistry Recent Labs      04/06/18 0425   GLU  117*   NA  139   K  3.6   CL  102   CO2  24   BUN  5*   CREA  0.83   CA  8.3*   AGAP  13   BUCR  6*   AP  41*   TP  6.8   ALB  3.0*   GLOB  3.8   AGRAT  0.8      CBC w/Diff Recent Labs      04/06/18 0425   WBC  6.2   RBC  3.95*   HGB  11.4*   HCT  34.6*   PLT  151   GRANS  76*   LYMPH  14*   EOS  0      Coagulation No results for input(s): PTP, INR, APTT in the last 72 hours. No lab exists for component: INREXT    Liver Enzymes Recent Labs      04/06/18 0425   TP  6.8   ALB  3.0*   AP  41*   SGOT  40*          Assessment/Plan: S/P  laparoscopic gastric bypass surgery - doing well without any issues    Orders are pending until UGI study shows normal anatomy. 1.Start bariatric diet and protein shakes  2. D/C IV pain meds and start PO pain meds  3. D/C SIVA drain  4. Likely PM D/C if continues to be okay and tolerates PO

## 2018-04-06 NOTE — PROGRESS NOTES
0730 - Bedside shift change report given to Vannesa Sandoval RN (oncoming nurse) by Ghulam Cisneros RN (offgoing nurse). Report included the following information SBAR, Kardex, OR Summary, Intake/Output, MAR and Recent Results. Pt resting quietly in bed;  at bedside. Aware of goal to urinate by 11am (patton was out at 5am). Denies concerns/complaints. Summary: Pt comfortable throughout shift. Voided 600cc clear yellow urine since patton removal.  Ambulates more than QID with steady gait. Compliant with IS, VLADIMIR, SCD, TCDB. Total SIVA drainage prior to removal: 15cc SS/brown. Tolerated full liquid lunch well. Pain controlled with oral regimen. Pt is leaving with prn zofran Rx for c/o intermittent nausea without emesis. Patient Vitals for the past 12 hrs:   Temp Pulse Resp BP SpO2   04/06/18 0853 99 °F (37.2 °C) 63 20 135/86 100 %   04/06/18 0535 99.3 °F (37.4 °C) 63 18 136/72 98 %     1400 - I have reviewed discharge instructions and medications with the patient. The patient verbalized understanding. Pt to discharge home driven by family. Escorted to lobby on foot (pt request) by 3S nursing aide.

## 2018-04-06 NOTE — ROUTINE PROCESS
Bedside and Verbal shift change report given to CARLINE Peres Patient (oncoming nurse) by DANNY Nunes RN (offgoing nurse). Report included the following information SBAR, Kardex, Intake/Output and MAR.

## 2018-04-06 NOTE — PROGRESS NOTES
Bariatric Surgery                POD #1    Visit Vitals    /86    Pulse 63    Temp 99 °F (37.2 °C)    Resp 20    Ht 4' 10.5\" (1.486 m)    Wt 102.7 kg (226 lb 5 oz)    LMP 03/23/2018    SpO2 100%    BMI 46.49 kg/m2     Patient has minimal complaints of pain, minimal nausea noted     Exam:  Appears well in no distress  Lungs- clear bilaterally  Abd - soft, incisions look good without erythema           SIVA with minimal serosanguinous output  Extremities- no new edema or swelling    UGI - no obstrustion or leak    Data Review:    Labs: Results:       Chemistry Recent Labs      04/06/18 0425   GLU  117*   NA  139   K  3.6   CL  102   CO2  24   BUN  5*   CREA  0.83   CA  8.3*   AGAP  13   BUCR  6*   AP  41*   TP  6.8   ALB  3.0*   GLOB  3.8   AGRAT  0.8      CBC w/Diff Recent Labs      04/06/18 0425   WBC  6.2   RBC  3.95*   HGB  11.4*   HCT  34.6*   PLT  151   GRANS  76*   LYMPH  14*   EOS  0      Coagulation No results for input(s): PTP, INR, APTT in the last 72 hours. No lab exists for component: INREXT    Liver Enzymes Recent Labs      04/06/18   0425   TP  6.8   ALB  3.0*   AP  41*   SGOT  40*          Assessment/Plan: S/P  laparoscopic gastric bypass surgery - doing well without any issues    1.  Agree with DC

## 2018-04-06 NOTE — NURSE NAVIGATOR
Doing well. UGI results confirmed. Tolerating liquid diet and protein shakes. Denies nausea. Pain - 5.  SIVA output WNL. Adequate urine output. Encouraged to continue to cough, deep breathe and use spirometer every hour while awake. Encouraged to be out of bed ambulating no less than 3 times today. Dressings removed. Incisions dry and intact. Discussed diet and activities after discharge.

## 2018-04-06 NOTE — DISCHARGE SUMMARY
Discharge Summary    Patient: Sherman Canales               Sex: female          DOA: 4/5/2018         YOB: 1980      Age:  43 y.o.        LOS:  LOS: 1 day                Admit Date: 4/5/2018    Discharge Date: 4/6/2018    Admission Diagnoses: MORBID OBESITY,BMI 52  Morbid obesity with BMI of 45.0-49.9, [facility])    Discharge Diagnoses:    Problem List as of 4/6/2018  Date Reviewed: 3/26/2018          Codes Class Noted - Resolved    * (Principal)Morbid obesity with BMI of 45.0-49.9, [facility]) ICD-10-CM: E66.01, Z68.42  ICD-9-CM: 278.01, V85.42  4/5/2018 - Present        Morbid obesity ([facility] ICD-10-CM: E66.01  ICD-9-CM: 278.01  Unknown - Present        Morbid obesity with body mass index (BMI) of 40.0 to 49.9 ([facility] ICD-10-CM: E66.01  ICD-9-CM: 278.01  Unknown - Present        Constipation ICD-10-CM: K59.00  ICD-9-CM: 564.00  Unknown - Present        Functional dyspepsia ICD-10-CM: K30  ICD-9-CM: 536.8  Unknown - Present    Overview Signed 11/6/2017 11:15 AM by CHIKIS Cobb     uses OTC meds             History of colon polyps ICD-10-CM: Z86.010  ICD-9-CM: V12.72  Unknown - Present              Discharge Condition: Good    Hospital Course: The patient underwent  laparoscopic gastric bypass surgery  on 4/5/2018. The patient tolerated the procedure well. Vital signs remained stable and the patient was transferred to  3rd floor surgical unit without complications. The patient remained stable throughout the first night post operatively with stable vital signs and adequate urine output and pain control. Pain was controlled with Dilaudid IV and IV Tylenol . The patient on the first morning post operative was transferred to the radiology suite where they underwent a gastrograffin UGI study which showed no evidence of a leak or stricture. The drain was discontinued on POD # 1 and the patient was started on a bariatric liquid diet with protein shakes.  The patient progressed throughout the day and was ambulating well and tolerating their diet. They were therefore discharged home with instructions to notify me with any issues that may arise. Significant Diagnostic Studies:   Recent Labs      04/06/18 0425   HGB  11.4*     Recent Labs      04/06/18 0425   HCT  34.6*       Current Discharge Medication List      CONTINUE these medications which have NOT CHANGED    Details   oxyCODONE-acetaminophen (PERCOCET) 5-325 mg per tablet Take 1 Tab by mouth every four (4) hours as needed for Pain. Max Daily Amount: 6 Tabs. Qty: 30 Tab, Refills: 0    Associated Diagnoses: Post-op pain      Omeprazole delayed release (PRILOSEC D/R) 20 mg tablet Take 1 Tab by mouth daily. OTC  Qty: 30 Tab, Refills: 1             Activity: activity as tolerated with no heavy lifting of greater than 20 pounds. No anti- inflammatory medications. Use stool softeners at home as needed while taking pain medications since they are constipating. Diet: Bariatric liquid diet    Wound Care: Keep wound clean and dry, Reinforce dressing PRN and ice to area for comfort. Do not get wound wet for 2 days.     Follow-up: 14 days with Dr Romeo Giles M.D

## 2018-04-06 NOTE — DISCHARGE INSTRUCTIONS
[facility]  Aryan Pradhan. Demetrio Headley M.D., F.A.C.S.  [address] [place] [address]., [address] [place] [address], [address] [place] [address]  Office: [phone]    Fax:  [phone]    Discharge Instruction for Gastric Bypass Patients    Diet:   Continue with the liquid diet until you are seen in the office. Make sure you sip fluids all day. Goal for total fluid intake is a minimum of 64 ounces per day.  Aim for  Grams of protein every day. Occasionally protein shakes with whey protein will cause diarrhea after surgery due to newly developed lactose intolerance. If you experience this, there are other protein drinks on the market that do not use milk proteins such as whey. Activity:   Get up and walk at least once an hour during normal waking hours.  Walk a minimum of 30 minutes every day for exercise. Rest when you are tired.  Use your Incentive Spirometry (plastic breathing machine) 10 times every hour for two weeks. Take a slow steady breath in until you can not inhale anymore.  You may shower. No baths, hot tubs or swimming.  You may climb stairs. Take your time.  No lifting more than 10-15 lbs.  If you feel discomfort during an activity, rest.   Do not drive for 1 week or until you are off of all narcotics. Wound Care:   Clean incisions with soap and water when in the shower. Pat dry.  Leave steri-strips on until they fall off.  A small amount of drainage may be present from the incisions. Contact the office if you notice an increase in drainage, an odor, increased redness, or fever > 100.5. Medications:   You should have received a prescription for pain medication and Prilosec prior to surgery, if not, notify Dr. Merrick Mancilla team.  [facility] For upset stomach you may take over the counter medications such as Maalox, Mylanta, or Pepto Bismol.  Gas X works very well for bloating when eating or drinking.  You may take Tylenol. Do not exceed 4,000mg of Tylenol in one day. Percocet has Tylenol in it, you will need to consider this when taking Tylenol and Percocet together.  Non-aspirin based arthritis medications may be resumed.  Take a childrens or adult chewable multivitamin.  Milk of Magnesia will provide immediate relief of constipation. Daily use of Miralax or Benefiber may be needed if you develop chronic constipation.  It is fine to take your usual home medications. Blood pressure medications should be continued after surgery, unless it is a diuretic. Diabetic medications can frequently be reduced very quickly after surgery. Diabetics should continue to monitor blood sugars frequently after surgery and contact the prescribing physician for any questions. Follow-Up Appointment:   If you do not already have a follow-up appointment scheduled, contact the office in the next few days to obtain one. It is usually scheduled for 10-14 days after your surgery date. Office phone number:  [phone].  Call our office if you have questions, concerns or any worsening of condition. If you are not able to reach us, go to your primary care provider or the Emergency Department.

## 2018-04-06 NOTE — PROGRESS NOTES
Shift summary: Pt A&Ox4, up with assistance, patton draining clear, yellow, urine- removed at 0500. Pain managed with IV Dilaudid.

## 2018-04-07 NOTE — PROGRESS NOTES
PROGRESS NOTE  Ritu Sol  4/7/18     I talked with Ms. Liban Beach about 2:30 in the morning. She had called through the answering service stating that she had a bit of pink colored urine, probably a bit of blood in it. I told her that is sometimes related to the Araujo catheter and is not a real concern. The other symptom she was having was she was having some moderate amount of sweats. I asked her to take her temperature to make sure she did not have a fever and otherwise she was feeling about the same as she did when she left the hospital. There was no increase in her abdominal pain. She had no shortness of breath or any other symptoms. I checked back with her about 9:30 in the morning. She states she is feeling slightly better and her urine seemed to be much clearer. She was having no additional abdominal pain and now working on her incentive spirometer. I have asked her to check her temperature several times today. If she has a fever, she is to call me back. Otherwise, if she seems to be feeling better throughout the day, then that would be normal for post operative recovery. She will call me if she has any issues at all.

## 2018-04-15 ENCOUNTER — TELEPHONE (OUTPATIENT)
Dept: SURGERY | Age: 38
End: 2018-04-15

## 2018-04-15 NOTE — PROGRESS NOTES
Progress Note  Gualberto King  4/15/18    Ms. King called the answering service and she stated that she feeling just a bit dizzy and had a very dry mouth, but had absolutely no increase in any abdominal pain or any other symptomatology and that was her only symptom of note. I queried as to whether or not she was having shortness of breath or pain in her abdomen and she stated she was not. She actually stated that she has had absolutely no problem with oral intake of liquids, but probably has been a bit shy of her goal. I asked her if she was taking any other medications and she stated she was not. We discussed hydration of in or around 64 ounces per day, total limit. She was try to get some Poweraide Zero to see if this helps out her suspected mild dehydration that she is experiencing. Once again, she had no other complaints and is following up in our office on Tuesday.      Sita/sanford

## 2018-04-17 ENCOUNTER — NURSE NAVIGATOR (OUTPATIENT)
Dept: SURGERY | Age: 38
End: 2018-04-17

## 2018-04-17 NOTE — PROGRESS NOTES
Called Gualberto to follow up on her phone call with Dr. Lorenza Coronado regarding dehydration. No answer. LM to call if any further concerns.

## 2018-04-19 ENCOUNTER — OFFICE VISIT (OUTPATIENT)
Dept: SURGERY | Age: 38
End: 2018-04-19

## 2018-04-19 VITALS
WEIGHT: 215.9 LBS | SYSTOLIC BLOOD PRESSURE: 137 MMHG | HEIGHT: 59 IN | BODY MASS INDEX: 43.52 KG/M2 | OXYGEN SATURATION: 100 % | HEART RATE: 67 BPM | DIASTOLIC BLOOD PRESSURE: 65 MMHG

## 2018-04-19 DIAGNOSIS — Z98.84 S/P GASTRIC BYPASS: ICD-10-CM

## 2018-04-19 DIAGNOSIS — K90.9 INTESTINAL MALABSORPTION, UNSPECIFIED TYPE: Primary | ICD-10-CM

## 2018-04-19 RX ORDER — GLUCOSAMINE/CHONDR SU A SOD 750-600 MG
2500 TABLET ORAL 2 TIMES DAILY
COMMUNITY
End: 2018-10-16

## 2018-04-19 NOTE — MR AVS SNAPSHOT
[address] [facility] 
 
 
 [address] [place] [address] [facility] 
[phone] Patient: Ana Aguirre MRN: [id number] :1980 Visit Information Date & Time Provider Department Dept. Phone Encounter #  
 2018  2:00 PM Rosendo Briggs, 82 [facility] [place] [phone] Follow-up Instructions Return in about 2 weeks (around 5/3/2018). Follow-up and Disposition History Your Appointments 5/3/2018  2:00 PM  
Office Visit with CHIKIS Romo [facility] [place] ([facility]) Appt Note: 1 mo po  
 [id number] [facility] [address] [address]  
[phone]  
  
   
 [address] [place] [address]  
  
    
 5/3/2018  2:30 PM  
Office Visit with RAYMOND NUTRI VISIT PEN Lashanda Salomon [facility] ([address] [place] [address]) Appt Note: 1 mo po  
 [id number] [facility] [place] [address] [place] [address]  
  
   
 [address] [place] [address] Upcoming Health Maintenance Date Due DTaP/Tdap/Td series (1 - Tdap) 2000 PAP AKA CERVICAL CYTOLOGY 2000 Influenza Age 5 to Adult 2017 Allergies as of 2018  Review Complete On: 2018 By: CHIKIS Romo Severity Noted Reaction Type Reactions Niacin High 2018   Intolerance Other (comments) Morphine  2017    Hives, Itching Current Immunizations  Reviewed on 2018 No immunizations on file. Not reviewed this visit You Were Diagnosed With   
  
 Codes Comments Intestinal malabsorption, unspecified type    -  Primary ICD-10-CM: K90.9 ICD-9-CM: 579.9 S/P gastric bypass     ICD-10-CM: G81.04 ICD-9-CM: V45.86 Vitals BP Pulse Height(growth percentile) Weight(growth percentile) LMP SpO2 137/65 (BP 1 Location: Right arm, BP Patient Position: Sitting) 67 4' 10.5\" (1.486 m) 215 lb 14.4 oz (97.9 kg) 03/23/2018 100% BMI OB Status Smoking Status 44.36 kg/m2 Having regular periods Never Smoker BMI and BSA Data Body Mass Index Body Surface Area 44.36 kg/m 2 2.01 m 2 Your Updated Medication List  
  
   
This list is accurate as of 4/19/18  2:44 PM.  Always use your most recent med list.  
  
  
  
  
 Biotin 2,500 mcg Cap Take  by mouth. CALCIUM 600 + D 600-125 mg-unit Tab Generic drug:  calcium-cholecalciferol (d3) Take  by mouth.  
  
 multivitamin with iron chewable tablet Commonly known as:  Lachelle Londono Take 1 Tab by mouth daily. Follow-up Instructions Return in about 2 weeks (around 5/3/2018). Patient Instructions Patient Instructions 1. Remember hydration goals - minimum of 64 ounces of liquids per day (dehydration is the number one reason for hospital readmission). 2. Continue to monitor carbohydrate and protein intake you need a minimum of  Grams of protein daily- remember to keep your total carbohydrates to 50 grams or less per day for best results. 3. Continue to work towards exercise goals - 60-90 minutes, 5 times a week minimum of deliberate, aerobic exercise is the ultimate goal with strength training 2 times each week. Refer to [employer] for  information. 4. Remember to take vitamins as directed in your handbook. 5. Attend support group the 2nd Thursday of each month. 6. Constipation: Milk of Magnesia is for immediate relief. Miralax is to be used every day if constipation is a chronic problem. 7. Diarrhea: patients will occasionally develop lactose intolerance after surgery. Check to see if your protein shake has whey in it.   If it does try one that does not have whey and stop all yogurts, cheeses and milks to see if the diarrhea goes away. 8. Call us at [phone] or email us through [email or web address]" with questions,     concerns or worsening of condition, we have someone on call 24 hours a day. If you are unable to reach our office, you are to go to your Primary Care Physician or the Emergency Department. Supplement Resource Guide Importance of Protein:  
Maintains lean body mass, produces antibodies to fight off infections, heals wounds, minimizes hair loss, helps to give you energy, helps with satiety, and keeping you full between meals. Importance of Calcium: 
Needed for healthy bones and teeth, normal blood clotting, and nervous system functioning, higher risk of osteoporosis and bone disease with non-compliance. Importance of Multivitamins: Many functions. Supply you with extra nutrients that you may be missing from food. May lead to iron deficiency anemia, weakness, fatigue, and many other symptoms with non-compliance. Importance of B Vitamins: 
Important for red blood cell formation, metabolism, energy, and helps to maintain a healthy nervous system. Protein Supplement Find one you like now. Use immediately after surgery. Look for: 
35-50g protein each day from your protein supplement once you reach the progression diet. 0-3 g fat per serving 0-3 g sugar per serving Protein drinks should be split in separate dosages. Recommend: Lifelong 1 year + Calcium Supplement:  
 
Start taking within a month after surgery. Look for: Calcium Citrate Plus D (1500 mg per day) Recommend: Citracal 
 
 . Avoid chocolate chewable calcium. Can use chewable bariatric or [employer] brand or similar chewable. The body cannot absorb more than 500-600 mg of calcium at a time. Take for Life Multi-vitamin Supplement:   
 
Start immediately after surgery: any complete chewable, such as: [place]s Complete chewables. Avoid [place] sours or gummies. They lack iron and other important nutrients and also have added sugar. Continue with chewable vitamin or change to adult complete multivitamin one month after surgery. Menstruating women can take a prenatal vitamin. Make sure has at least 18 mg iron and 489-330 mcg folic acid Vitamin B12, B Complex Vitamin, and Biotin Start taking within a month after surgery. Vitamin B12:  1000 mcg of Vitamin B12 three times weekly Must take sublingually (meaning you take it under your tongue) or in a liquid drop form for easy absorption. B Complex Vitamin: Take a pill or liquid drop form once daily. Biotin: This vitamin can help prevent hair loss. Recommend 5mg  
(5000 mcg) a day Biotin is Optional  
 
 
 
 
 
 
  
Introducing [facility]! [employer] introduces SNADEC patient portal. Now you can access parts of your medical record, email your doctor's office, and request medication refills online. 1. In your internet browser, go to [email or web address] 2. Click on the First Time User? Click Here link in the Sign In box. You will see the New Member Sign Up page. 3. Enter your SNADEC Access Code exactly as it appears below. You will not need to use this code after youve completed the sign-up process. If you do not sign up before the expiration date, you must request a new code. · SNADEC Access Code: [email or web address] Expires: 5/7/2018  5:54 PM 
 
4. Enter the last four digits of your Social Security Number (xxxx) and Date of Birth (mm/dd/yyyy) as indicated and click Submit. You will be taken to the next sign-up page. 5. Create a SNADEC ID. This will be your SNADEC login ID and cannot be changed, so think of one that is secure and easy to remember. 6. Create a SNADEC password. You can change your password at any time. 7. Enter your Password Reset Question and Answer.  This can be used at a later time if you forget your password. 8. Enter your e-mail address. You will receive e-mail notification when new information is available in [address] 9. Click Sign Up. You can now view and download portions of your medical record. 10. Click the Download Summary menu link to download a portable copy of your medical information. If you have questions, please visit the Frequently Asked Questions section of the GameWith website. Remember, GameWith is NOT to be used for urgent needs. For medical emergencies, dial 911. Now available from your iPhone and Android! Please provide this summary of care documentation to your next provider. Your primary care clinician is listed as Phys Other. If you have any questions after today's visit, please call [phone].

## 2018-04-19 NOTE — PROGRESS NOTES
Subjective:      Nehal Lujan is a 43 y.o. female is now 2 weeks status post laparoscopic gastric bypass surgery. Doing well overall. She has lost a total of 11 pounds since surgery. Body mass index is 44.36 kg/(m^2). Currently on a liquid diet without difficulty. Taking in 80oz water daily. Sources of protein include protein shakes. Has been walking 2-2.5 miles every day. Bowel movements are constipated. The patient is not having any pain. . The patient is compliant with multivitamins. Surgery related complications: none. Liver bx report reviewed with patient. Stomach bx report reviewed with patient. Has had numbness and tingling in her RUE and RLE.      Weight Loss Metrics 4/19/2018 4/5/2018 3/26/2018 3/16/2018 1/30/2018 12/6/2017 11/29/2017   Today's Wt 215 lb 14.4 oz 226 lb 5 oz 230 lb 230 lb 231 lb 230 lb 226 lb 14.4 oz   BMI 44.36 kg/m2 46.49 kg/m2 47.25 kg/m2 47.25 kg/m2 47.46 kg/m2 47.25 kg/m2 46.61 kg/m2          Comorbidities:    Hypertension: not applicable  Diabetes: not applicable  Obstructive Sleep Apnea: not applicable  Hyperlipidemia: not applicable  Stress Urinary Incontinence: unchanged  Gastroesophageal Reflux: not applicable  Weight related arthropathy:improved, has bilateral knee and ankle pain     Patient Active Problem List   Diagnosis Code    Morbid obesity ([facility] E66.01    Morbid obesity with body mass index (BMI) of 40.0 to 49.9 ([facility]) E66.01    Constipation K59.00    Functional dyspepsia K30    History of colon polyps Z86.010    Morbid obesity with BMI of 45.0-49.9, adult ([facility]) E66.01, Z68.42    Intestinal malabsorption K90.9    S/P gastric bypass Z98.84        Past Medical History:   Diagnosis Date    Constipation     Functional dyspepsia     uses OTC meds    History of colon polyps     Morbid obesity ([facility]     Morbid obesity with body mass index (BMI) of 40.0 to 49.9 [facility])        Past Surgical History:   Procedure Laterality Date    COLONOSCOPY      X 3    DELIVERY       X 3    HX OTHER SURGICAL      scar resection back of head    HX TUBAL LIGATION      IR URETERAL STENT PLACEMENT         Current Outpatient Prescriptions   Medication Sig Dispense Refill    multivitamin with iron ([place]) chewable tablet Take 1 Tab by mouth daily.  Biotin 2,500 mcg cap Take  by mouth.  calcium-cholecalciferol, d3, (CALCIUM 600 + D) 600-125 mg-unit tab Take  by mouth. Allergies   Allergen Reactions    Niacin Other (comments)    Morphine Hives and Itching         Objective:     Visit Vitals    /65 (BP 1 Location: Right arm, BP Patient Position: Sitting)    Pulse 67    Ht 4' 10.5\" (1.486 m)    Wt 97.9 kg (215 lb 14.4 oz)    LMP 2018    SpO2 100%    BMI 44.36 kg/m2        General:  alert, cooperative, no distress, appears stated age   Chest: lungs clear to auscultation, no rhonchi, rales or wheezes, no accessory muscle use   Cor:   Regular rate and rhythm or without murmur or extra heart sounds   Abdomen: soft, bowel sounds active, non-tender, no masses or organomegaly   Incisions:   healing well, no drainage, no erythema, no hernia, no seroma, no swelling, no dehiscence, incision well approximated       Assessment:   History of Morbid obesity, status post laparoscopic gastric bypass surgery. Doing well postoperatively. Constipation - Miralax daily  Plan:       1. Follow up with PCP if numbness of RLE and RUE does not resolve. 2. Advance diet to soft solid phase. Reminded to measure portions, continue high protein, low carbohydrate diet. Reminded to eat regularly, to eat slowly & not to drink with meals. Refer to the handbook given in class. 3. Continue multivitamin   4. Continue current medications and follow up with PCP for management of regimen. 5. Encouraged to attend support group   6. I have discussed this plan with patient and they verbalized understanding  7.  Follow up in 2 weeks or sooner if patient has questions, concerns or worsening of condition, if unable to reach our office, patient should report to the ED. 8. Ms. Abdiel Fowler has a reminder for a \"due or due soon\" health maintenance. I have asked that she contact her primary care provider for a follow-up on this health maintenance.

## 2018-04-19 NOTE — PATIENT INSTRUCTIONS
Patient Instructions      1. Remember hydration goals - minimum of 64 ounces of liquids per day (dehydration is the number one reason for hospital readmission). 2. Continue to monitor carbohydrate and protein intake you need a minimum of  Grams of protein daily- remember to keep your total carbohydrates to 50 grams or less per day for best results. 3. Continue to work towards exercise goals - 60-90 minutes, 5 times a week minimum of deliberate, aerobic exercise is the ultimate goal with strength training 2 times each week. Refer to [employer] for  information. 4. Remember to take vitamins as directed in your handbook. 5. Attend support group the 2nd Thursday of each month. 6. Constipation: Milk of Magnesia is for immediate relief. Miralax is to be used every day if constipation is a chronic problem. 7. Diarrhea: patients will occasionally develop lactose intolerance after surgery. Check to see if your protein shake has whey in it. If it does try one that does not have whey and stop all yogurts, cheeses and milks to see if the diarrhea goes away. 8. Call us at [phone] or email us through [email or web address]" with questions,     concerns or worsening of condition, we have someone on call 24 hours a day. If you are unable to reach our office, you are to go to your Primary Care Physician or the Emergency Department. Supplement Resource Guide    Importance of Protein:   Maintains lean body mass, produces antibodies to fight off infections, heals wounds, minimizes hair loss, helps to give you energy, helps with satiety, and keeping you full between meals. Importance of Calcium:  Needed for healthy bones and teeth, normal blood clotting, and nervous system functioning, higher risk of osteoporosis and bone disease with non-compliance. Importance of Multivitamins: Many functions.   Supply you with extra nutrients that you may be missing from food. May lead to iron deficiency anemia, weakness, fatigue, and many other symptoms with non-compliance. Importance of B Vitamins:  Important for red blood cell formation, metabolism, energy, and helps to maintain a healthy nervous system. Protein Supplement  Find one you like now. Use immediately after surgery. Look for:  35-50g protein each day from your protein supplement once you reach the progression diet. 0-3 g fat per serving  0-3 g sugar per serving    Protein drinks should be split in separate dosages. Recommend: Lifelong  1 year + Calcium Supplement:     Start taking within a month after surgery. Look for: Calcium Citrate Plus D (1500 mg per day)  Recommend: Citracal     .            Avoid chocolate chewable calcium. Can use chewable bariatric or [employer] brand or similar chewable. The body cannot absorb more than 500-600 mg of calcium at a time. Take for Life Multi-vitamin Supplement:      Start immediately after surgery: any complete chewable, such as: [place]s Complete chewables. Avoid [place] sours or gummies. They lack iron and other important nutrients and also have added sugar. Continue with chewable vitamin or change to adult complete multivitamin one month after surgery. Menstruating women can take a prenatal vitamin. Make sure has at least 18 mg iron and 067-306 mcg folic acid   Vitamin Z74, B Complex Vitamin, and Biotin  Start taking within a month after surgery. Vitamin B12:  1000 mcg of Vitamin B12 three times weekly    Must take sublingually (meaning you take it under your tongue) or in a liquid drop form for easy absorption. B Complex Vitamin: Take a pill or liquid drop form once daily. Biotin: This vitamin can help prevent hair loss.     Recommend 5mg   (5000 mcg) a day  Biotin is Optional

## 2018-04-26 ENCOUNTER — TELEPHONE (OUTPATIENT)
Dept: SURGERY | Age: 38
End: 2018-04-26

## 2018-04-26 NOTE — TELEPHONE ENCOUNTER
Called patient in response to voicemail about left sided pain. Patient advised to apply indirect heat to left side and take Rx pain medication and OTC Tylenol as directed. Patient also advised to go back to liquid diet until pain subsides. Patient verbalized understanding. Advised patient to f/u with office if symptoms worsen. Education also provided about symptoms of Gallbladder discomfort.

## 2018-05-03 ENCOUNTER — OFFICE VISIT (OUTPATIENT)
Dept: SURGERY | Age: 38
End: 2018-05-03

## 2018-05-03 VITALS
DIASTOLIC BLOOD PRESSURE: 78 MMHG | OXYGEN SATURATION: 100 % | HEIGHT: 59 IN | SYSTOLIC BLOOD PRESSURE: 118 MMHG | TEMPERATURE: 97.7 F | WEIGHT: 209.3 LBS | HEART RATE: 71 BPM | BODY MASS INDEX: 42.2 KG/M2

## 2018-05-03 DIAGNOSIS — Z98.84 S/P GASTRIC BYPASS: ICD-10-CM

## 2018-05-03 DIAGNOSIS — R11.15 NON-INTRACTABLE CYCLICAL VOMITING WITH NAUSEA: ICD-10-CM

## 2018-05-03 DIAGNOSIS — K90.9 INTESTINAL MALABSORPTION, UNSPECIFIED TYPE: Primary | ICD-10-CM

## 2018-05-03 DIAGNOSIS — E66.01 MORBID OBESITY WITH BMI OF 45.0-49.9, ADULT (HCC): Primary | ICD-10-CM

## 2018-05-03 DIAGNOSIS — K90.9 INTESTINAL MALABSORPTION, UNSPECIFIED TYPE: ICD-10-CM

## 2018-05-03 DIAGNOSIS — K59.01 SLOW TRANSIT CONSTIPATION: ICD-10-CM

## 2018-05-03 DIAGNOSIS — K21.9 GASTROESOPHAGEAL REFLUX DISEASE WITHOUT ESOPHAGITIS: ICD-10-CM

## 2018-05-03 RX ORDER — POLYETHYLENE GLYCOL 3350 17 G/17G
17 POWDER, FOR SOLUTION ORAL DAILY
Qty: 1700 G | Refills: 3 | Status: SHIPPED | OUTPATIENT
Start: 2018-05-03 | End: 2019-04-17

## 2018-05-03 RX ORDER — ONDANSETRON 4 MG/1
4 TABLET, ORALLY DISINTEGRATING ORAL
Qty: 60 TAB | Refills: 2 | Status: SHIPPED | OUTPATIENT
Start: 2018-05-03 | End: 2018-06-06

## 2018-05-03 RX ORDER — URSODIOL 500 MG/1
500 TABLET, FILM COATED ORAL DAILY
Qty: 30 TAB | Refills: 4 | Status: SHIPPED | OUTPATIENT
Start: 2018-05-03 | End: 2018-06-02

## 2018-05-03 RX ORDER — PANTOPRAZOLE SODIUM 20 MG/1
20 TABLET, DELAYED RELEASE ORAL DAILY
Qty: 60 TAB | Refills: 3 | Status: SHIPPED | OUTPATIENT
Start: 2018-05-03 | End: 2018-07-03

## 2018-05-03 RX ORDER — OMEPRAZOLE 20 MG/1
20 TABLET, DELAYED RELEASE ORAL DAILY
COMMUNITY
End: 2018-05-04

## 2018-05-03 NOTE — MR AVS SNAPSHOT
[address] 
[phone] Patient: Noemy Sands MRN: [id number] :1980 Visit Information Date & Time Provider Department Dept. Phone Encounter #  
 5/3/2018  2:00 PM Casey Wallace, 82 [facility] [phone] [id number] Follow-up Instructions Return in about 1 month (around 6/3/2018). Upcoming Health Maintenance Date Due DTaP/Tdap/Td series (1 - Tdap) 2000 PAP AKA CERVICAL CYTOLOGY 2000 Influenza Age 5 to Adult 2018 Allergies as of 5/3/2018  Review Complete On: 5/3/2018 By: Sandra Fontanez LPN Severity Noted Reaction Type Reactions Niacin High 2018   Intolerance Other (comments) Morphine  2017    Hives, Itching Current Immunizations  Reviewed on 2018 No immunizations on file. Not reviewed this visit You Were Diagnosed With   
  
 Codes Comments Intestinal malabsorption, unspecified type    -  Primary ICD-10-CM: K90.9 ICD-9-CM: 579.9 S/P gastric bypass     ICD-10-CM: F41.29 ICD-9-CM: V45.86 Non-intractable cyclical vomiting with nausea     ICD-10-CM: G43. A0 ICD-9-CM: 536.2 Gastroesophageal reflux disease without esophagitis     ICD-10-CM: K21.9 ICD-9-CM: 530.81 Slow transit constipation     ICD-10-CM: K59.01 
ICD-9-CM: 564.01 Vitals BP Pulse Temp Height(growth percentile) Weight(growth percentile) 118/78 (BP 1 Location: Right arm, BP Patient Position: Sitting) 71 97.7 °F (36.5 °C) (Temporal) 4' 10.5\" (1.486 m) 209 lb 4.8 oz (94.9 kg) SpO2 BMI OB Status Smoking Status 100% 43 kg/m2 Having regular periods Never Smoker BMI and BSA Data Body Mass Index Body Surface Area 43 kg/m 2 1.98 m 2 Your Updated Medication List  
  
   
This list is accurate as of 5/3/18  3:24 PM.  Always use your most recent med list.  
  
  
  
  
 Biotin 2,500 mcg Cap Take  by mouth. CALCIUM 600 + D 600-125 mg-unit Tab Generic drug:  calcium-cholecalciferol (d3) Take  by mouth.  
  
 multivitamin with iron chewable tablet Commonly known as:  Gouldbusk Webb Take 1 Tab by mouth daily. ondansetron 4 mg disintegrating tablet Commonly known as:  ZOFRAN ODT Take 1 Tab by mouth every eight (8) hours as needed for Nausea. pantoprazole 20 mg tablet Commonly known as:  PROTONIX Take 1 Tab by mouth daily. polyethylene glycol 17 gram/dose powder Commonly known as:  Marcellina Dull Take 17 g by mouth daily. PriLOSEC OTC 20 mg tablet Generic drug:  omeprazole Take 20 mg by mouth daily. ursodiol 500 mg tablet Commonly known as:  PARISH FORTE Take 1 Tab by mouth daily for 30 days. Prescriptions Printed Refills  
 ondansetron (ZOFRAN ODT) 4 mg disintegrating tablet 2 Sig: Take 1 Tab by mouth every eight (8) hours as needed for Nausea. Class: Print Route: Oral  
 pantoprazole (PROTONIX) 20 mg tablet 3 Sig: Take 1 Tab by mouth daily. Class: Print Route: Oral  
 polyethylene glycol (MIRALAX) 17 gram/dose powder 3 Sig: Take 17 g by mouth daily. Class: Print Route: Oral  
 ursodiol (PARISH FORTE) 500 mg tablet 4 Sig: Take 1 Tab by mouth daily for 30 days. Class: Print Route: Oral  
  
Follow-up Instructions Return in about 1 month (around 6/3/2018). To-Do List   
 05/03/2018 Lab:  BILIRUBIN, FRACTIONATED   
  
 05/03/2018 Lab:  CBC WITH AUTOMATED DIFF   
  
 05/03/2018 Lab:  LIPASE   
  
 05/03/2018 Lab:  MAGNESIUM   
  
 05/03/2018 Lab:  METABOLIC PANEL, COMPREHENSIVE   
  
 05/03/2018 Lab:  VITAMIN B1, WHOLE BLOOD   
  
 05/04/2018 10:00 AM  
  Appointment with [address] [facility] 2 at [facility] [address] [place] [address] ([phone]) Patient Instructions Patient Instructions 1. Remember hydration goals - minimum of 64 ounces of liquids per day (dehydration is the number one reason for hospital readmission). 2. Continue to monitor carbohydrate and protein intake you need a minimum of  Grams of protein daily- remember to keep your total carbohydrates to 50 grams or less per day for best results. 3. Continue to work towards exercise goals - 60-90 minutes, 5 times a week minimum of deliberate, aerobic exercise is the ultimate goal with strength training 2 times each week. Refer to [employer] for  information. 4. Remember to take vitamins as directed in your handbook. 5. Attend support group the 2nd Thursday of each month. 6. Constipation: Milk of Magnesia is for immediate relief. Miralax is to be used every day if constipation is a chronic problem. 7. Diarrhea: patients will occasionally develop lactose intolerance after surgery. Check to see if your protein shake has whey in it. If it does try one that does not have whey and stop all yogurts, cheeses and milks to see if the diarrhea goes away. 8. Call us at [phone] or email us through [facility]" with questions,     concerns or worsening of condition, we have someone on call 24 hours a day. If you are unable to reach our office, you are to go to your Primary Care Physician or the Emergency Department. Supplement Resource Guide Importance of Protein:  
Maintains lean body mass, produces antibodies to fight off infections, heals wounds, minimizes hair loss, helps to give you energy, helps with satiety, and keeping you full between meals. Importance of Calcium: 
Needed for healthy bones and teeth, normal blood clotting, and nervous system functioning, higher risk of osteoporosis and bone disease with non-compliance. Importance of Multivitamins: Many functions.   Supply you with extra nutrients that you may be missing from food. May lead to iron deficiency anemia, weakness, fatigue, and many other symptoms with non-compliance. Importance of B Vitamins: 
Important for red blood cell formation, metabolism, energy, and helps to maintain a healthy nervous system. Protein Supplement Find one you like now. Use immediately after surgery. Look for: 
35-50g protein each day from your protein supplement once you reach the progression diet. 0-3 g fat per serving 0-3 g sugar per serving Protein drinks should be split in separate dosages. Recommend: Lifelong 1 year + Calcium Supplement:  
 
Start taking within a month after surgery. Look for: Calcium Citrate Plus D (1500 mg per day) Recommend: Citracal 
 
 . Avoid chocolate chewable calcium. Can use chewable bariatric or GNC brand or similar chewable. The body cannot absorb more than 500-600 mg of calcium at a time. Take for Life Multi-vitamin Supplement:   
 
Start immediately after surgery: any complete chewable, such as: Voltaires Complete chewables. Avoid Voltaire sours or gummies. They lack iron and other important nutrients and also have added sugar. Continue with chewable vitamin or change to adult complete multivitamin one month after surgery. Menstruating women can take a prenatal vitamin. Make sure has at least 18 mg iron and 183-322 mcg folic acid Vitamin B12, B Complex Vitamin, and Biotin Start taking within a month after surgery. Vitamin B12:  1000 mcg of Vitamin B12 three times weekly Must take sublingually (meaning you take it under your tongue) or in a liquid drop form for easy absorption. B Complex Vitamin: Take a pill or liquid drop form once daily. Biotin: This vitamin can help prevent hair loss. Recommend 5mg  
(5000 mcg) a day Biotin is Optional  
 
 
 
 
 
 
  
Introducing [facility]!    
 [facility] introduces [employer] patient portal. Now you can access parts of your medical record, email your doctor's office, and request medication refills online. 1. In your internet browser, go to [email or web address] 2. Click on the First Time User? Click Here link in the Sign In box. You will see the New Member Sign Up page. 3. Enter your Route[employer] Access Code exactly as it appears below. You will not need to use this code after youve completed the sign-up process. If you do not sign up before the expiration date, you must request a new code. · Routehappy Access Code: [email or web address] Expires: 5/7/2018  5:54 PM 
 
4. Enter the last four digits of your Social Security Number (xxxx) and Date of Birth (mm/dd/yyyy) as indicated and click Submit. You will be taken to the next sign-up page. 5. Create a Routehappy ID. This will be your Routehappy login ID and cannot be changed, so think of one that is secure and easy to remember. 6. Create a Route[employer] password. You can change your password at any time. 7. Enter your Password Reset Question and Answer. This can be used at a later time if you forget your password. 8. Enter your e-mail address. You will receive e-mail notification when new information is available in [address] 9. Click Sign Up. You can now view and download portions of your medical record. 10. Click the Download Summary menu link to download a portable copy of your medical information. If you have questions, please visit the Frequently Asked Questions section of the Route[employer] website. Remember, Routehappy is NOT to be used for urgent needs. For medical emergencies, dial 911. Now available from your iPhone and Android! Please provide this summary of care documentation to your next provider. Your primary care clinician is listed as Phys Other. If you have any questions after today's visit, please call [phone].

## 2018-05-03 NOTE — PROGRESS NOTES
Subjective:      Yamil Saez is a 43 y.o. female is now 1 months status post laparoscopic gastric bypass surgery. Doing well overall. She has lost a total of 21 pounds since surgery. There is no height or weight on file to calculate BMI. Has lost 19% of EBW. Currently on a soft food diet with difficulty, reports vomiting and denies abdominal pain. Taking in 16oz water daily. Sources of protein include 1 shake daily.  min of activity 6 days a week, including walking/jogging. Bowel movements are constipated, has used Miralax. The patient is not having any pain. . The patient is compliant with multivitamins.      Weight Loss Metrics 2018 2018 3/26/2018 3/16/2018 2018 2017 2017   Today's Wt 215 lb 14.4 oz 226 lb 5 oz 230 lb 230 lb 231 lb 230 lb 226 lb 14.4 oz   BMI 44.36 kg/m2 46.49 kg/m2 47.25 kg/m2 47.25 kg/m2 47.46 kg/m2 47.25 kg/m2 46.61 kg/m2          Comorbidities:    Hypertension: not applicable  Diabetes: not applicable  Obstructive Sleep Apnea: not applicable  Hyperlipidemia: not applicable  Stress Urinary Incontinence: improved  Gastroesophageal Reflux: not applicable  Weight related arthropathy:unchanged, bilateral knee and ankle pain     Patient Active Problem List   Diagnosis Code    Morbid obesity ([facility] E66.01    Morbid obesity with body mass index (BMI) of 40.0 to 49.9 ([facility]) E66.01    Constipation K59.00    Functional dyspepsia K30    History of colon polyps Z86.010    Morbid obesity with BMI of 45.0-49.9, adult ([facility]) E66.01, Z68.42    Intestinal malabsorption K90.9    S/P gastric bypass Z98.84        Past Medical History:   Diagnosis Date    Constipation     Functional dyspepsia     uses OTC meds    History of colon polyps     Morbid obesity ([facility]     Morbid obesity with body mass index (BMI) of 40.0 to 49.9 ([facility])        Past Surgical History:   Procedure Laterality Date    COLONOSCOPY      X 3    DELIVERY       X 3    HX OTHER SURGICAL      scar resection back of head    HX TUBAL LIGATION      IR URETERAL STENT PLACEMENT         Current Outpatient Prescriptions   Medication Sig Dispense Refill    multivitamin with iron (FLINTSTONES) chewable tablet Take 1 Tab by mouth daily.  Biotin 2,500 mcg cap Take  by mouth.  calcium-cholecalciferol, d3, (CALCIUM 600 + D) 600-125 mg-unit tab Take  by mouth. Allergies   Allergen Reactions    Niacin Other (comments)    Morphine Hives and Itching         Objective: There were no vitals taken for this visit. General:  alert, cooperative, no distress, appears stated age   Chest: lungs clear to auscultation, no rhonchi, rales or wheezes, no accessory muscle use   Cor:   Regular rate and rhythm or without murmur or extra heart sounds   Abdomen: soft, bowel sounds active, non-tender, no masses or organomegaly   Incisions:   healing well, no drainage, no erythema, no hernia, no seroma, no swelling, no dehiscence, incision well approximated       Assessment:   History of Morbid obesity, status post laparoscopic gastric bypass surgery. Nausea and vomiting - zofran ordered, labs ordered  Dehydration - OPIC  Constipation - Miralax  GERD - Protonix    Plan:     1. Follow up labs as ordered   2. Start 500mg [place] [employer] today, stop after 6 months out from surgery. 3. Can stop probiotic    4. Advance diet to solid phase. Reminded to measure portions, continue high protein, low carbohydrate diet. Reminded to eat regularly, to eat slowly & not to drink with meals. Refer to the handbook given in class. 5. Patient has appt with dietician directly after this visit. 6. Continue multivitamin and add the additional vitamin supplementation (Ca, B complex, B12, D, all listed in handbook)  7. Continue current medications and follow up with PCP for management of regimen. 8. Continue cardio exercise and add resistance exercises. Discussed with patient.  Minimum of 30 minutes of exercise daily.  9. Encouraged to attend support group   10. I have discussed this plan with patient and they verbalized understanding  11. Follow up in 1 months or sooner if patient has questions, concerns or worsening of condition, if unable to reach our office, patient should report to the ED. 12. Ms. Ibis Davis has a reminder for a \"due or due soon\" health maintenance. I have asked that she contact her primary care provider for a follow-up on this health maintenance.

## 2018-05-03 NOTE — PROGRESS NOTES
Pt given one on one diet education. Appears to have a good understanding of the diet progression, food choices, and dietary/exercise habits for successful weight loss and nourishment one month after surgery. The  material included: post-op diet progression and portion sizes (including low fat, low sugar food recommendations and emphasis on protein foods and protein supplements), good beverage choices, reading a food label, vitamins/minerals required after weight loss surgery, and encouraging dietary and exercise habits that lead to weight loss success. Pt also received a restaurant card, which tells restaurants that the patient had a procedure that decreases the size of their stomach so the restaurant may let them order off the children's menu, the senior's menu, or a smaller portion for a reduced rate.      Elder Chung RD

## 2018-05-03 NOTE — PATIENT INSTRUCTIONS
Patient Instructions      1. Remember hydration goals - minimum of 64 ounces of liquids per day (dehydration is the number one reason for hospital readmission). 2. Continue to monitor carbohydrate and protein intake you need a minimum of  Grams of protein daily- remember to keep your total carbohydrates to 50 grams or less per day for best results. 3. Continue to work towards exercise goals - 60-90 minutes, 5 times a week minimum of deliberate, aerobic exercise is the ultimate goal with strength training 2 times each week. Refer to [employer] for  information. 4. Remember to take vitamins as directed in your handbook. 5. Attend support group the 2nd Thursday of each month. 6. Constipation: Milk of Magnesia is for immediate relief. Miralax is to be used every day if constipation is a chronic problem. 7. Diarrhea: patients will occasionally develop lactose intolerance after surgery. Check to see if your protein shake has whey in it. If it does try one that does not have whey and stop all yogurts, cheeses and milks to see if the diarrhea goes away. 8. Call us at [phone] or email us through [facility]" with questions,     concerns or worsening of condition, we have someone on call 24 hours a day. If you are unable to reach our office, you are to go to your Primary Care Physician or the Emergency Department. Supplement Resource Guide    Importance of Protein:   Maintains lean body mass, produces antibodies to fight off infections, heals wounds, minimizes hair loss, helps to give you energy, helps with satiety, and keeping you full between meals. Importance of Calcium:  Needed for healthy bones and teeth, normal blood clotting, and nervous system functioning, higher risk of osteoporosis and bone disease with non-compliance. Importance of Multivitamins: Many functions.   Supply you with extra nutrients that you may be missing from food. May lead to iron deficiency anemia, weakness, fatigue, and many other symptoms with non-compliance. Importance of B Vitamins:  Important for red blood cell formation, metabolism, energy, and helps to maintain a healthy nervous system. Protein Supplement  Find one you like now. Use immediately after surgery. Look for:  35-50g protein each day from your protein supplement once you reach the progression diet. 0-3 g fat per serving  0-3 g sugar per serving    Protein drinks should be split in separate dosages. Recommend: Lifelong  1 year + Calcium Supplement:     Start taking within a month after surgery. Look for: Calcium Citrate Plus D (1500 mg per day)  Recommend: Citracal     .            Avoid chocolate chewable calcium. Can use chewable bariatric or GNC brand or similar chewable. The body cannot absorb more than 500-600 mg of calcium at a time. Take for Life Multi-vitamin Supplement:      Start immediately after surgery: any complete chewable, such as: [employer]s Complete chewables. Avoid [place] sours or gummies. They lack iron and other important nutrients and also have added sugar. Continue with chewable vitamin or change to adult complete multivitamin one month after surgery. Menstruating women can take a prenatal vitamin. Make sure has at least 18 mg iron and 982-877 mcg folic acid   Vitamin Y87, B Complex Vitamin, and Biotin  Start taking within a month after surgery. Vitamin B12:  1000 mcg of Vitamin B12 three times weekly    Must take sublingually (meaning you take it under your tongue) or in a liquid drop form for easy absorption. B Complex Vitamin: Take a pill or liquid drop form once daily. Biotin: This vitamin can help prevent hair loss.     Recommend 5mg   (5000 mcg) a day  Biotin is Optional

## 2018-05-04 ENCOUNTER — TELEPHONE (OUTPATIENT)
Dept: SURGERY | Age: 38
End: 2018-05-04

## 2018-05-04 ENCOUNTER — HOSPITAL ENCOUNTER (OUTPATIENT)
Dept: INFUSION THERAPY | Age: 38
Discharge: HOME | End: 2018-05-04
Payer: OTHER GOVERNMENT

## 2018-05-04 VITALS
TEMPERATURE: 98.7 F | SYSTOLIC BLOOD PRESSURE: 113 MMHG | HEART RATE: 66 BPM | DIASTOLIC BLOOD PRESSURE: 82 MMHG | OXYGEN SATURATION: 99 % | RESPIRATION RATE: 16 BRPM

## 2018-05-04 DIAGNOSIS — E83.42 HYPOMAGNESEMIA: ICD-10-CM

## 2018-05-04 DIAGNOSIS — E87.6 HYPOKALEMIA: Primary | ICD-10-CM

## 2018-05-04 PROCEDURE — 74011000250 HC RX REV CODE- 250: Performed by: PHYSICIAN ASSISTANT

## 2018-05-04 PROCEDURE — 96366 THER/PROPH/DIAG IV INF ADDON: CPT

## 2018-05-04 PROCEDURE — 96361 HYDRATE IV INFUSION ADD-ON: CPT

## 2018-05-04 PROCEDURE — 74011250636 HC RX REV CODE- 250/636: Performed by: PHYSICIAN ASSISTANT

## 2018-05-04 PROCEDURE — 96360 HYDRATION IV INFUSION INIT: CPT

## 2018-05-04 PROCEDURE — 96365 THER/PROPH/DIAG IV INF INIT: CPT

## 2018-05-04 PROCEDURE — 96375 TX/PRO/DX INJ NEW DRUG ADDON: CPT

## 2018-05-04 RX ORDER — POTASSIUM CHLORIDE 750 MG/1
10 TABLET, EXTENDED RELEASE ORAL 2 TIMES DAILY
Qty: 60 TAB | Refills: 0 | Status: SHIPPED | OUTPATIENT
Start: 2018-05-04 | End: 2018-06-27

## 2018-05-04 RX ORDER — SODIUM CHLORIDE 9 MG/ML
1000 INJECTION, SOLUTION INTRAVENOUS CONTINUOUS
Status: DISPENSED | OUTPATIENT
Start: 2018-05-04 | End: 2018-05-05

## 2018-05-04 RX ORDER — SODIUM CHLORIDE 0.9 % (FLUSH) 0.9 %
5-10 SYRINGE (ML) INJECTION AS NEEDED
Status: DISCONTINUED | OUTPATIENT
Start: 2018-05-04 | End: 2018-05-08 | Stop reason: HOSPADM

## 2018-05-04 RX ORDER — SODIUM CHLORIDE 9 MG/ML
1000 INJECTION, SOLUTION INTRAVENOUS ONCE
Status: COMPLETED | OUTPATIENT
Start: 2018-05-04 | End: 2018-05-04

## 2018-05-04 RX ORDER — ONDANSETRON 2 MG/ML
4 INJECTION INTRAMUSCULAR; INTRAVENOUS ONCE
Status: COMPLETED | OUTPATIENT
Start: 2018-05-04 | End: 2018-05-04

## 2018-05-04 RX ADMIN — Medication 10 ML: at 12:56

## 2018-05-04 RX ADMIN — Medication 4 MG: at 10:50

## 2018-05-04 RX ADMIN — FOLIC ACID: 5 INJECTION, SOLUTION INTRAMUSCULAR; INTRAVENOUS; SUBCUTANEOUS at 10:52

## 2018-05-04 RX ADMIN — SODIUM CHLORIDE 1000 ML: 900 INJECTION, SOLUTION INTRAVENOUS at 10:35

## 2018-05-04 RX ADMIN — SODIUM CHLORIDE 1000 ML: 900 INJECTION, SOLUTION INTRAVENOUS at 11:50

## 2018-05-04 NOTE — PROGRESS NOTES
[facility] Progress Note    Date: May 4, 2018    Name: Selma Cochran              MRN: [id number]              : 1980    Hydration       Ms. Dank Stark was assessed and education was provided. Ms. Dank Stark arrived ambulatory with  for hydration today. Received call from ordering physician's office that pt does not need labs drawn today. Pt c/o mid abdominal pain of 6/10 which she stated began today. Ms. Dank Stark stated she stopped in to see physician prior to this appointment and notified them of symptoms. Ms. Dank Stark stated she has been having nausea and vomiting on/off and has recently been unable to keep food down. PIV #22 gauge initiated in patient's LAC x1 attempt. Pt tolerated well. PA in to speak with patient while pt in infusion center. Ms. Klaudia Preciado vitals were reviewed. Visit Vitals    /82 (BP 1 Location: Left arm, BP Patient Position: Sitting)    Pulse 66    Temp 98.7 °F (37.1 °C)    Resp 16    SpO2 99%    Breastfeeding No     2000 ml of NS IV infused per orders without complications. Zofran 4 mg IV push infused per orders. 1000 ml banana bag IV infused over 2 hours per orders without complications. Ms. Dank Stark tolerated infusions, and had no complaints at this time. Ms. Dank Stark was discharged from [place] [address] in stable condition at 1300. She has no further appointments scheduled at this time and will follow up with physician as advised. Armband removed and shredded.      Veronique Guthrie RN  May 4, 2018  11:35 AM

## 2018-05-04 NOTE — TELEPHONE ENCOUNTER
I called patient and she has finished receiving fluids at [facility]. She states that she is still feeling nauseous, but is better than she was. She did her labs yesterday at [facility]. I called and had them faxed over. She is hypokalemic and has hypomagnesemia. I printed Rx for both and left printed Rx at our . She states she will pick it up on Monday.

## 2018-05-07 NOTE — PROGRESS NOTES
5/7/2018      RE: Camila Raza      To Whom it May Concern: This is to certify that Camila Raza was seen in our office on 5/4/2018 and may may return to work on May 7, 2018. Please feel free to contact my office if you have any questions or concerns. Thank you for your assistance in this matter.     Sincerely,      Arnold Carlin RN

## 2018-05-17 ENCOUNTER — TELEPHONE (OUTPATIENT)
Dept: SURGERY | Age: 38
End: 2018-05-17

## 2018-05-17 NOTE — TELEPHONE ENCOUNTER
Pt is about 5 weeks s/p laparoscopic gastric bypass. She is concerned about not losing weight for the past month. Discussed how her wt loss at one month was great and that she can expect stalls in wt loss but then to begin losing weight again. Pt is getting in at least 70 grams of protein, but calories may be slightly low. Recommend she add another protein snack or shake daily.

## 2018-06-06 ENCOUNTER — HOSPITAL ENCOUNTER (OUTPATIENT)
Dept: LAB | Age: 38
Discharge: HOME | End: 2018-06-06
Payer: OTHER GOVERNMENT

## 2018-06-06 ENCOUNTER — HOSPITAL ENCOUNTER (OUTPATIENT)
Age: 38
Setting detail: OUTPATIENT SURGERY
Discharge: HOME | End: 2018-06-06
Attending: SPECIALIST | Admitting: SPECIALIST
Payer: OTHER GOVERNMENT

## 2018-06-06 ENCOUNTER — OFFICE VISIT (OUTPATIENT)
Dept: SURGERY | Age: 38
End: 2018-06-06

## 2018-06-06 ENCOUNTER — APPOINTMENT (OUTPATIENT)
Dept: GENERAL RADIOLOGY | Age: 38
End: 2018-06-06
Attending: SPECIALIST
Payer: OTHER GOVERNMENT

## 2018-06-06 VITALS
OXYGEN SATURATION: 100 % | TEMPERATURE: 98.2 F | DIASTOLIC BLOOD PRESSURE: 77 MMHG | WEIGHT: 195.1 LBS | HEART RATE: 77 BPM | BODY MASS INDEX: 40.08 KG/M2 | SYSTOLIC BLOOD PRESSURE: 123 MMHG | RESPIRATION RATE: 19 BRPM

## 2018-06-06 VITALS
BODY MASS INDEX: 39.41 KG/M2 | WEIGHT: 195.5 LBS | DIASTOLIC BLOOD PRESSURE: 74 MMHG | RESPIRATION RATE: 16 BRPM | HEIGHT: 59 IN | OXYGEN SATURATION: 100 % | SYSTOLIC BLOOD PRESSURE: 121 MMHG | HEART RATE: 75 BPM

## 2018-06-06 DIAGNOSIS — E87.6 HYPOKALEMIA: ICD-10-CM

## 2018-06-06 DIAGNOSIS — K90.9 INTESTINAL MALABSORPTION, UNSPECIFIED TYPE: ICD-10-CM

## 2018-06-06 DIAGNOSIS — E66.01 MORBID OBESITY (HCC): ICD-10-CM

## 2018-06-06 DIAGNOSIS — Z98.84 S/P GASTRIC BYPASS: ICD-10-CM

## 2018-06-06 DIAGNOSIS — E83.42 HYPOMAGNESEMIA: ICD-10-CM

## 2018-06-06 DIAGNOSIS — K90.9 INTESTINAL MALABSORPTION, UNSPECIFIED TYPE: Primary | ICD-10-CM

## 2018-06-06 PROBLEM — R13.10 DYSPHAGIA: Status: ACTIVE | Noted: 2018-06-06

## 2018-06-06 PROBLEM — R11.2 NAUSEA & VOMITING: Status: ACTIVE | Noted: 2018-06-06

## 2018-06-06 LAB
ALBUMIN SERPL-MCNC: 4.1 G/DL (ref 3.4–5)
ALBUMIN/GLOB SERPL: 1 {RATIO} (ref 0.8–1.7)
ALP SERPL-CCNC: 66 U/L (ref 45–117)
ALT SERPL-CCNC: 28 U/L (ref 13–56)
ANION GAP SERPL CALC-SCNC: 14 MMOL/L (ref 3–18)
AST SERPL-CCNC: 23 U/L (ref 15–37)
BILIRUB SERPL-MCNC: 0.6 MG/DL (ref 0.2–1)
BUN SERPL-MCNC: 11 MG/DL (ref 7–18)
BUN/CREAT SERPL: 16 (ref 12–20)
CALCIUM SERPL-MCNC: 9.1 MG/DL (ref 8.5–10.1)
CHLORIDE SERPL-SCNC: 104 MMOL/L (ref 100–108)
CO2 SERPL-SCNC: 24 MMOL/L (ref 21–32)
CREAT SERPL-MCNC: 0.68 MG/DL (ref 0.6–1.3)
GLOBULIN SER CALC-MCNC: 4.1 G/DL (ref 2–4)
GLUCOSE SERPL-MCNC: 75 MG/DL (ref 74–99)
MAGNESIUM SERPL-MCNC: 1.8 MG/DL (ref 1.6–2.6)
POTASSIUM SERPL-SCNC: 4.5 MMOL/L (ref 3.5–5.5)
PROT SERPL-MCNC: 8.2 G/DL (ref 6.4–8.2)
SODIUM SERPL-SCNC: 142 MMOL/L (ref 136–145)

## 2018-06-06 PROCEDURE — 74011000255 HC RX REV CODE- 255: Performed by: SPECIALIST

## 2018-06-06 PROCEDURE — 74240 X-RAY XM UPR GI TRC 1CNTRST: CPT

## 2018-06-06 PROCEDURE — 83735 ASSAY OF MAGNESIUM: CPT | Performed by: PHYSICIAN ASSISTANT

## 2018-06-06 PROCEDURE — 84425 ASSAY OF VITAMIN B-1: CPT | Performed by: PHYSICIAN ASSISTANT

## 2018-06-06 PROCEDURE — 76040000019: Performed by: SPECIALIST

## 2018-06-06 PROCEDURE — 80053 COMPREHEN METABOLIC PANEL: CPT | Performed by: PHYSICIAN ASSISTANT

## 2018-06-06 PROCEDURE — 36415 COLL VENOUS BLD VENIPUNCTURE: CPT | Performed by: PHYSICIAN ASSISTANT

## 2018-06-06 RX ORDER — PROMETHAZINE HYDROCHLORIDE 12.5 MG/1
TABLET ORAL
COMMUNITY
End: 2018-06-27

## 2018-06-06 NOTE — PROGRESS NOTES
Subjective:      Jon Al is a 43 y.o. female is now 2 months status post laparoscopic gastric bypass surgery. Doing well overall. She has lost a total of 35 pounds since surgery. Body mass index is 40.16 kg/(m^2). Has lost 31% of EBW. Patient states she has been having numbness and tingling in her hands with associated memory loss, blurry vision and n/v.      Currently on a solid food diet with difficulty. Patient just completed an UGI which showed evidence of a stricture. Taking in 50oz water daily. Sources of protein include refried beans, fish and salad. Eating 1-2 meals each day. 60 min of activity 4-5 days a week, including power walking and running. Bowel movements are regular. The patient is not having any pain. . The patient is compliant with multivitamins, calcium, Vit D, B complex and B12 supplements.      Weight Loss Metrics 6/6/2018 6/6/2018 5/3/2018 4/19/2018 4/5/2018 3/26/2018 3/16/2018   Today's Wt 195 lb 8 oz 195 lb 1.6 oz 209 lb 4.8 oz 215 lb 14.4 oz 226 lb 5 oz 230 lb 230 lb   BMI 40.16 kg/m2 40.08 kg/m2 43 kg/m2 44.36 kg/m2 46.49 kg/m2 47.25 kg/m2 47.25 kg/m2          Comorbidities:    Hypertension: not applicable  Diabetes: not applicable  Obstructive Sleep Apnea: not applicable  Hyperlipidemia: not applicable  Stress Urinary Incontinence: improved  Gastroesophageal Reflux: not applicable  Weight related arthropathy:improved     Patient Active Problem List   Diagnosis Code    Morbid obesity ([facility] E66.01    Morbid obesity with body mass index (BMI) of 40.0 to 49.9 ([facility]) E66.01    Constipation K59.00    Functional dyspepsia K30    History of colon polyps Z86.010    Morbid obesity with BMI of 45.0-49.9, adult ([facility] E66.01, Z68.42    Intestinal malabsorption K90.9    S/P gastric bypass Z98.84    Hypokalemia E87.6    Hypomagnesemia E83.42    Nausea & vomiting R11.2    Dysphagia R13.10        Past Medical History:   Diagnosis Date    Constipation     Functional dyspepsia     uses OTC meds    History of colon polyps     Morbid obesity (HCC)     Morbid obesity with body mass index (BMI) of 40.0 to 49.9 (HCC)     Nausea & vomiting 2018       Past Surgical History:   Procedure Laterality Date    COLONOSCOPY      X 3    DELIVERY       X 3    HX OTHER SURGICAL      scar resection back of head    HX TUBAL LIGATION      IR URETERAL STENT PLACEMENT         Current Outpatient Prescriptions   Medication Sig Dispense Refill    promethazine (PHENERGAN) 12.5 mg tablet Take  by mouth every six (6) hours as needed for Nausea.  potassium chloride (KLOR-CON) 10 mEq tablet Take 1 Tab by mouth two (2) times a day. 60 Tab 0    magnesium chloride (SLOW-MAG) 71.5 mg tablet Take 1 Tab by mouth two (2) times a day. 60 Tab 0    pantoprazole (PROTONIX) 20 mg tablet Take 1 Tab by mouth daily. 60 Tab 3    polyethylene glycol (MIRALAX) 17 gram/dose powder Take 17 g by mouth daily. 1700 g 3    multivitamin with iron (FLINTSTONES) chewable tablet Take 1 Tab by mouth daily.  Biotin 2,500 mcg cap Take  by mouth. Allergies   Allergen Reactions    Niacin Other (comments)    Morphine Hives and Itching         Objective:     Visit Vitals    /74 (BP 1 Location: Right arm, BP Patient Position: Sitting)    Pulse 75    Resp 16    Ht 4' 10.5\" (1.486 m)    Wt 88.7 kg (195 lb 8 oz)    SpO2 100%    BMI 40.16 kg/m2       General:  alert, cooperative, no distress, appears stated age   Chest: lungs clear to auscultation, no rhonchi, rales or wheezes, no accessory muscle use   Cor:   Regular rate and rhythm or without murmur or extra heart sounds   Abdomen: soft, bowel sounds active, non-tender, no masses or organomegaly   Incisions:   healing well, no drainage, no erythema, no hernia, no seroma, no swelling, no dehiscence, incision well approximated       Assessment:   History of Morbid obesity, status post laparoscopic gastric bypass surgery.   Doing well postoperatively. Dysphagia - EGD with dilation  Numbness and tingling of BUE, memory loss, blurry vision - labs (B1)  Plan:     1. Follow up labs as ordered  2. Reminded to measure portions, continue high protein, low carbohydrate diet. Reminded to eat regularly, to eat slowly & not to drink with meals. 3. Continue vitamin supplementation  4. Continue current medications and follow up with PCP for management of regimen. 5. Continue cardio exercise and resistance exercises. 60-90 min aerobic exercise 5 times a week and strength training 2 days each week. 6. Encouraged to attend support group   7. I have discussed this plan with patient and they verbalized understanding  8. Follow up in 2 months or sooner if patient has questions, concerns or worsening of condition, if unable to reach our office, patient should report to the ED. 9. Ms. Freedom Giordano has a reminder for a \"due or due soon\" health maintenance. I have asked that she contact her primary care provider for a follow-up on this health maintenance.

## 2018-06-06 NOTE — IP AVS SNAPSHOT
[address] [facility] 
 
 
 [address] [address] 
[phone] Patient: Moustapha Braun MRN: [id number] :1980 About your hospitalization You were admitted on:  2018 You last received care in the:  THE [facility] ENDOSCOPY You were discharged on:  2018 Why you were hospitalized Your primary diagnosis was:  Not on File Follow-up Information None Your Scheduled Appointments 2018  4:00 PM EDT Office Visit with CHIKIS Sinclair [employer] [facility] Megan Hernandez ([facility])  
 [address] [facility]  
[phone] 2018 ENDOSCOPY with Dorothea Huynh MD  
THE [facility] ENDOSCOPY [facility]) [address] [address]  
[phone] Discharge Orders None A check lillie indicates which time of day the medication should be taken. My Medications ASK your doctor about these medications Instructions Each Dose to Equal  
 Morning Noon Evening Bedtime Biotin 2,500 mcg Cap Your last dose was: Your next dose is: Take  by mouth.  
     
   
   
   
  
 magnesium chloride 71.5 mg tablet Commonly known as:  SLOW-MAG Your last dose was: Your next dose is: Take 1 Tab by mouth two (2) times a day. 71.5 mg  
    
   
   
   
  
 multivitamin with iron chewable tablet Commonly known as:  Lizzeth Sales Your last dose was: Your next dose is: Take 1 Tab by mouth daily. 1 Tab  
    
   
   
   
  
 ondansetron 4 mg disintegrating tablet Commonly known as:  ZOFRAN ODT Your last dose was: Your next dose is: Take 1 Tab by mouth every eight (8) hours as needed for Nausea. 4 mg  
    
   
   
   
  
 pantoprazole 20 mg tablet Commonly known as:  PROTONIX Your last dose was: Your next dose is: Take 1 Tab by mouth daily. 20 mg  
    
   
   
   
  
 polyethylene glycol 17 gram/dose powder Commonly known as:  Oralee Donath Your last dose was: Your next dose is: Take 17 g by mouth daily. 17 g  
    
   
   
   
  
 potassium chloride 10 mEq tablet Commonly known as:  KLOR-CON Your last dose was: Your next dose is: Take 1 Tab by mouth two (2) times a day. 10 mEq Discharge Instructions None Introducing [facility] SERVICES! [employer] introduces QUIQ patient portal. Now you can access parts of your medical record, email your doctor's office, and request medication refills online. 1. In your internet browser, go to [email or web address] 2. Click on the First Time User? Click Here link in the Sign In box. You will see the New Member Sign Up page. 3. Enter your QUIQ Access Code exactly as it appears below. You will not need to use this code after youve completed the sign-up process. If you do not sign up before the expiration date, you must request a new code. · QUIQ Access Code: [email or web address] Expires: 9/4/2018  2:09 PM 
 
4. Enter the last four digits of your Social Security Number (xxxx) and Date of Birth (mm/dd/yyyy) as indicated and click Submit. You will be taken to the next sign-up page. 5. Create a QUIQ ID. This will be your QUIQ login ID and cannot be changed, so think of one that is secure and easy to remember. 6. Create a QUIQ password. You can change your password at any time. 7. Enter your Password Reset Question and Answer. This can be used at a later time if you forget your password. 8. Enter your e-mail address. You will receive e-mail notification when new information is available in [address] 9. Click Sign Up. You can now view and download portions of your medical record. 10. Click the Download Summary menu link to download a portable copy of your medical information. If you have questions, please visit the Frequently Asked Questions section of the [employer] website. Remember, [employer] is NOT to be used for urgent needs. For medical emergencies, dial 911. Now available from your iPhone and Android! Introducing Rogerio Arroyo As a [employer] patient, I wanted to make you aware of our electronic visit tool called Rogerio Arroyo. [employer] 24/7 allows you to connect within minutes with a medical provider 24 hours a day, seven days a week via a mobile device or tablet or logging into a secure website from your computer. You can access Rogerio Arroyo from anywhere in the [place]. A virtual visit might be right for you when you have a simple condition and feel like you just dont want to get out of bed, or cant get away from work for an appointment, when your regular [employer] provider is not available (evenings, weekends or holidays), or when youre out of town and need minor care. Electronic visits cost only $49 and if the [employer] 24/7 provider determines a prescription is needed to treat your condition, one can be electronically transmitted to a nearby pharmacy*. Please take a moment to enroll today if you have not already done so. The enrollment process is free and takes just a few minutes. To enroll, please download the [employer] 24/7 ayden to your tablet or phone, or visit [email or web address] to enroll on your computer. And, as an [address] [place] [address] patient with a [employer] account, the results of your visits will be scanned into your electronic medical record and your primary care provider will be able to view the scanned results. We urge you to continue to see your regular [employer] provider for your ongoing medical care.   And while your primary care provider may not be the one available when you seek a Rogerio Arroyo virtual visit, the peace of mind you get from getting a real diagnosis real time can be priceless. For more information on Rogerio Arroyo, view our Frequently Asked Questions (FAQs) at [email or web address]. Sincerely, 
 
Justo Person MD 
Chief Medical Officer [employer] *:  certain medications cannot be prescribed via Rogerio Arroyo Unresulted Labs-Please follow up with your PCP about these lab tests Order Current Status XR GASTROGRAFFIN UPPER GI In process Providers Seen During Your Hospitalization Provider Specialty Primary office phone Harish Drew MD General Surgery [phone] Your Primary Care Physician (PCP) Primary Care Physician Office Phone Office Fax OTHER, PHYS ** None ** ** None ** You are allergic to the following Allergen Reactions Niacin Other (comments) Morphine Hives Itching Recent Documentation Weight BMI OB Status Smoking Status 88.5 kg 40.08 kg/m2 Having regular periods Never Smoker Emergency Contacts Name Discharge Info Relation Home Work Mobile Troy King DISCHARGE CAREGIVER [3] Spouse [3]   [phone] Patient Belongings The following personal items are in your possession at time of discharge: 
                             
 
  
  
 Please provide this summary of care documentation to your next provider. Signatures-by signing, you are acknowledging that this After Visit Summary has been reviewed with you and you have received a copy. Patient Signature:  ____________________________________________________________ Date:  ____________________________________________________________  
  
Jaclyn Bolivar Provider Signature:  ____________________________________________________________ Date:  ____________________________________________________________

## 2018-06-06 NOTE — PATIENT INSTRUCTIONS
Patient Instructions      1. Remember hydration goals - minimum of 64 ounces of liquids per day (dehydration is the number one reason for hospital readmission). 2. Continue to monitor carbohydrate and protein intake you need a minimum of  Grams of protein daily- remember to keep your total carbohydrates to 50 grams or less per day for best results. 3. Continue to work towards exercise goals - 60-90 minutes, 5 times a week minimum of deliberate, aerobic exercise is the ultimate goal with strength training 2 times each week. Refer to [employer] for  information. 4. Remember to take vitamins as directed in your handbook. 5. Attend support group the 2nd Thursday of each month. 6. Constipation: Milk of Magnesia is for immediate relief. Miralax is to be used every day if constipation is a chronic problem. 7. Diarrhea: patients will occasionally develop lactose intolerance after surgery. Check to see if your protein shake has whey in it. If it does try one that does not have whey and stop all yogurts, cheeses and milks to see if the diarrhea goes away. 8. Call us at [phone] or email us through [email or web address]" with questions,     concerns or worsening of condition, we have someone on call 24 hours a day. If you are unable to reach our office, you are to go to your Primary Care Physician or the Emergency Department. Supplement Resource Guide    Importance of Protein:   Maintains lean body mass, produces antibodies to fight off infections, heals wounds, minimizes hair loss, helps to give you energy, helps with satiety, and keeping you full between meals. Importance of Calcium:  Needed for healthy bones and teeth, normal blood clotting, and nervous system functioning, higher risk of osteoporosis and bone disease with non-compliance. Importance of Multivitamins: Many functions.   Supply you with extra nutrients that you may be missing from food. May lead to iron deficiency anemia, weakness, fatigue, and many other symptoms with non-compliance. Importance of B Vitamins:  Important for red blood cell formation, metabolism, energy, and helps to maintain a healthy nervous system. Protein Supplement  Find one you like now. Use immediately after surgery. Look for:  35-50g protein each day from your protein supplement once you reach the progression diet. 0-3 g fat per serving  0-3 g sugar per serving    Protein drinks should be split in separate dosages. Recommend: Lifelong  1 year + Calcium Supplement:     Start taking within a month after surgery. Look for: Calcium Citrate Plus D (1500 mg per day)  Recommend: Citracal     .            Avoid chocolate chewable calcium. Can use chewable bariatric or [employer] brand or similar chewable. The body cannot absorb more than 500-600 mg of calcium at a time. Take for Life Multi-vitamin Supplement:      Start immediately after surgery: any complete chewable, such as: Rising Suns Complete chewables. Avoid Rising Sun sours or gummies. They lack iron and other important nutrients and also have added sugar. Continue with chewable vitamin or change to adult complete multivitamin one month after surgery. Menstruating women can take a prenatal vitamin. Make sure has at least 18 mg iron and 274-101 mcg folic acid   Vitamin L33, B Complex Vitamin, and Biotin  Start taking within a month after surgery. Vitamin B12:  1000 mcg of Vitamin B12 three times weekly    Must take sublingually (meaning you take it under your tongue) or in a liquid drop form for easy absorption. B Complex Vitamin: Take a pill or liquid drop form once daily. Biotin: This vitamin can help prevent hair loss.     Recommend 5mg   (5000 mcg) a day  Biotin is Optional

## 2018-06-06 NOTE — IP AVS SNAPSHOT
[address] [address] 
[phone] Patient: Danny Ruffin MRN: [id number] :1980 A check lillie indicates which time of day the medication should be taken. My Medications ASK your doctor about these medications Instructions Each Dose to Equal  
 Morning Noon Evening Bedtime Biotin 2,500 mcg Cap Your last dose was: Your next dose is: Take  by mouth.  
     
   
   
   
  
 magnesium chloride 71.5 mg tablet Commonly known as:  SLOW-MAG Your last dose was: Your next dose is: Take 1 Tab by mouth two (2) times a day. 71.5 mg  
    
   
   
   
  
 multivitamin with iron chewable tablet Commonly known as:  Tomi Tidwell Your last dose was: Your next dose is: Take 1 Tab by mouth daily. 1 Tab  
    
   
   
   
  
 ondansetron 4 mg disintegrating tablet Commonly known as:  ZOFRAN ODT Your last dose was: Your next dose is: Take 1 Tab by mouth every eight (8) hours as needed for Nausea. 4 mg  
    
   
   
   
  
 pantoprazole 20 mg tablet Commonly known as:  PROTONIX Your last dose was: Your next dose is: Take 1 Tab by mouth daily. 20 mg  
    
   
   
   
  
 polyethylene glycol 17 gram/dose powder Commonly known as:  Cyrilla Brash Your last dose was: Your next dose is: Take 17 g by mouth daily. 17 g  
    
   
   
   
  
 potassium chloride 10 mEq tablet Commonly known as:  KLOR-CON Your last dose was: Your next dose is: Take 1 Tab by mouth two (2) times a day. 10 mEq

## 2018-06-06 NOTE — PROCEDURES
2 months post gastric bypass with poor PO progression. Stricture on UGI. ... will need EGD.   See dictation

## 2018-06-06 NOTE — IP AVS SNAPSHOT
Summary of Care Report The Summary of Care report has been created to help improve care coordination. Users with access to [employer] or [address] (Web-based application) may access additional patient information including the Discharge Summary. If you are not currently a [employer] user and need more information, please call the number listed below in the [place] [address] section and ask to be connected with Medical Records. Facility Information Name Address Phone [address] [address] [phone] Patient Information Patient Name Sex CRUZ Cobos ([id number]) Female 1980 Discharge Information Admitting Provider Service Area Unit Cece Pardo MD /  [facility] / [phone] Discharge Provider Discharge Date/Time Discharge Disposition Destination (none) (none) (none) (none) Patient Language Language ENGLISH [13] Hospital Problems as of 2018  Reviewed: 2018  1:17 PM by CHIKIS Krishnamurthy None Non-Hospital Problems as of 2018  Reviewed: 2018  1:17 PM by CHIKIS Krishnamurthy Class Noted - Resolved Last Modified Active Problems Morbid obesity ([facility]  Unknown - Present 2017 by CHIKIS Lopez Entered by CHIKIS Lopez Morbid obesity with body mass index (BMI) of 40.0 to 49.9 [facility])  Unknown - Present 2017 by CHIKIS Lopez Entered by CHIKIS Lopez Constipation  Unknown - Present 2017 by CHIKIS Lopez Entered by CHIKIS Lopez Functional dyspepsia  Unknown - Present 2017 by CHIKIS Lopez Entered by CHIKIS Lopez Overview Signed 2017 11:15 AM by CHIKIS Lopez  
   uses OTC meds History of colon polyps  Unknown - Present 11/6/2017 by Deberah Harada, PA Entered by Deberah Harada, PA Morbid obesity with BMI of 45.0-49.9, adult ([facility]  4/5/2018 - Present 4/5/2018 by Nicole Trejo MD  
  Entered by Nicole Trejo MD  
  Intestinal malabsorption  4/19/2018 - Present 4/19/2018 by CHIKIS Byrd Entered by CHIKIS Byrd  
  S/P gastric bypass  4/19/2018 - Present 4/19/2018 by CHIKIS Byrd Entered by CHIKIS Byrd You are allergic to the following Allergen Reactions Niacin Other (comments) Morphine Hives Itching Current Discharge Medication List  
  
ASK your doctor about these medications Dose & Instructions Dispensing Information Comments Biotin 2,500 mcg Cap Take  by mouth. Refills:  0  
   
 magnesium chloride 71.5 mg tablet Commonly known as:  SLOW-MAG Dose:  71.5 mg Take 1 Tab by mouth two (2) times a day. Quantity:  60 Tab Refills:  0  
   
 multivitamin with iron chewable tablet Commonly known as:  Mohamud Usama Dose:  1 Tab Take 1 Tab by mouth daily. Refills:  0  
   
 ondansetron 4 mg disintegrating tablet Commonly known as:  ZOFRAN ODT Dose:  4 mg Take 1 Tab by mouth every eight (8) hours as needed for Nausea. Quantity:  60 Tab Refills:  2  
   
 pantoprazole 20 mg tablet Commonly known as:  PROTONIX Dose:  20 mg Take 1 Tab by mouth daily. Quantity:  60 Tab Refills:  3  
   
 polyethylene glycol 17 gram/dose powder Commonly known as:  Gregery Sprung Dose:  17 g Take 17 g by mouth daily. Quantity:  1700 g Refills:  3  
   
 potassium chloride 10 mEq tablet Commonly known as:  KLOR-CON Dose:  10 mEq Take 1 Tab by mouth two (2) times a day. Quantity:  60 Tab Refills:  0 Surgery Information ID Date/Time Status Primary Surgeon All Procedures Location [id number] 6/6/2018 1800 Unposted Amparo Geiger MD UGI ONLY WILL DO IN FILL CLINIC  THE [facility] ENDOSCOPY Follow-up Information None Discharge Instructions None Chart Review Routing History No Routing History on File

## 2018-06-06 NOTE — MR AVS SNAPSHOT
[address] [facility] 
 
 
 [address] [address] [address] [facility] 
[phone] Patient: Madhav Griffith MRN: [id number] :1980 Visit Information Date & Time Provider Department Dept. Phone Encounter #  
 2018  4:00 PM Frankie Rodriges, 82 [facility] [phone] [id number] Follow-up Instructions Return in about 2 months (around 2018). Your Appointments 2018  4:00 PM  
Office Visit with CHIKIS Krishnamurthy [facility] ([address] [facility]) Appt Note: F/U  
 [address] [facility] [address] [facility] [address] [facility] [address]  
[phone]  
  
   
 [facility]  
  
    
 2018  7:30 AM  
Office Visit with CHIKIS Krishnamurthy [facility] ([address] [place] [address]) Appt Note: F/U  
 [address] [address] [address] [facility]  
[phone] Upcoming Health Maintenance Date Due DTaP/Tdap/Td series (1 - Tdap) 2000 PAP AKA CERVICAL CYTOLOGY 2000 Influenza Age 5 to Adult 2018 Allergies as of 2018  Review Complete On: 2018 By: CHIKSI Krishnamurthy Severity Noted Reaction Type Reactions Niacin High 2018   Intolerance Other (comments) Morphine  2017    Hives, Itching Current Immunizations  Reviewed on 2018 No immunizations on file. Not reviewed this visit You Were Diagnosed With   
  
 Codes Comments Intestinal malabsorption, unspecified type    -  Primary ICD-10-CM: K90.9 ICD-9-CM: 579.9 S/P gastric bypass     ICD-10-CM: K85.69 ICD-9-CM: V45.86 Hypokalemia     ICD-10-CM: E87.6 ICD-9-CM: 276.8 Hypomagnesemia     ICD-10-CM: A68.77 
ICD-9-CM: 275.2 Vitals BP Pulse Height(growth percentile) Weight(growth percentile) SpO2 BMI 121/74 (BP 1 Location: Right arm, BP Patient Position: Sitting) 75 4' 10.5\" (1.486 m) 195 lb 8 oz (88.7 kg) 100% 40.16 kg/m2 OB Status Smoking Status Having regular periods Never Smoker Vitals History BMI and BSA Data Body Mass Index Body Surface Area  
 40.16 kg/m 2 1.91 m 2 Your Updated Medication List  
  
   
This list is accurate as of 6/6/18  3:26 PM.  Always use your most recent med list.  
  
  
  
  
 Biotin 2,500 mcg Cap Take  by mouth.  
  
 magnesium chloride 71.5 mg tablet Commonly known as:  SLOW-MAG Take 1 Tab by mouth two (2) times a day. multivitamin with iron chewable tablet Commonly known as:  Blanquita Quitaque Take 1 Tab by mouth daily. pantoprazole 20 mg tablet Commonly known as:  PROTONIX Take 1 Tab by mouth daily. polyethylene glycol 17 gram/dose powder Commonly known as:  Lawerance Amas Take 17 g by mouth daily. potassium chloride 10 mEq tablet Commonly known as:  KLOR-CON Take 1 Tab by mouth two (2) times a day. promethazine 12.5 mg tablet Commonly known as:  PHENERGAN Take  by mouth every six (6) hours as needed for Nausea. Follow-up Instructions Return in about 2 months (around 8/6/2018). To-Do List   
 06/06/2018 Lab:  MAGNESIUM   
  
 06/06/2018 Lab:  METABOLIC PANEL, COMPREHENSIVE   
  
 06/06/2018 Lab:  VITAMIN B1, WHOLE BLOOD Patient Instructions Patient Instructions 1. Remember hydration goals - minimum of 64 ounces of liquids per day (dehydration is the number one reason for hospital readmission). 2. Continue to monitor carbohydrate and protein intake you need a minimum of  Grams of protein daily- remember to keep your total carbohydrates to 50 grams or less per day for best results.  
3. Continue to work towards exercise goals - 60-90 minutes, 5 times a week minimum of deliberate, aerobic exercise is the ultimate goal with strength training 2 times each week. Refer to [place] for  information. 4. Remember to take vitamins as directed in your handbook. 5. Attend support group the 2nd Thursday of each month. 6. Constipation: Milk of Magnesia is for immediate relief. Miralax is to be used every day if constipation is a chronic problem. 7. Diarrhea: patients will occasionally develop lactose intolerance after surgery. Check to see if your protein shake has whey in it. If it does try one that does not have whey and stop all yogurts, cheeses and milks to see if the diarrhea goes away. 8. Call us at [phone] or email us through [email or web address]" with questions,     concerns or worsening of condition, we have someone on call 24 hours a day. If you are unable to reach our office, you are to go to your Primary Care Physician or the Emergency Department. Supplement Resource Guide Importance of Protein:  
Maintains lean body mass, produces antibodies to fight off infections, heals wounds, minimizes hair loss, helps to give you energy, helps with satiety, and keeping you full between meals. Importance of Calcium: 
Needed for healthy bones and teeth, normal blood clotting, and nervous system functioning, higher risk of osteoporosis and bone disease with non-compliance. Importance of Multivitamins: Many functions. Supply you with extra nutrients that you may be missing from food. May lead to iron deficiency anemia, weakness, fatigue, and many other symptoms with non-compliance. Importance of B Vitamins: 
Important for red blood cell formation, metabolism, energy, and helps to maintain a healthy nervous system. Protein Supplement Find one you like now. Use immediately after surgery. Look for: 
35-50g protein each day from your protein supplement once you reach the progression diet. 0-3 g fat per serving 0-3 g sugar per serving Protein drinks should be split in separate dosages. Recommend: Lifelong 1 year + Calcium Supplement:  
 
Start taking within a month after surgery. Look for: Calcium Citrate Plus D (1500 mg per day) Recommend: Citracal 
 
 . Avoid chocolate chewable calcium. Can use chewable bariatric or [employer] brand or similar chewable. The body cannot absorb more than 500-600 mg of calcium at a time. Take for Life Multi-vitamin Supplement:   
 
Start immediately after surgery: any complete chewable, such as: Grahams Complete chewables. Avoid Graham sours or gummies. They lack iron and other important nutrients and also have added sugar. Continue with chewable vitamin or change to adult complete multivitamin one month after surgery. Menstruating women can take a prenatal vitamin. Make sure has at least 18 mg iron and 586-281 mcg folic acid Vitamin B12, B Complex Vitamin, and Biotin Start taking within a month after surgery. Vitamin B12:  1000 mcg of Vitamin B12 three times weekly Must take sublingually (meaning you take it under your tongue) or in a liquid drop form for easy absorption. B Complex Vitamin: Take a pill or liquid drop form once daily. Biotin: This vitamin can help prevent hair loss. Recommend 5mg  
(5000 mcg) a day Biotin is Optional  
 
 
 
 
 
 
  
Introducing [facility]! Karyn Wilburn introduces ForMune patient portal. Now you can access parts of your medical record, email your doctor's office, and request medication refills online. 1. In your internet browser, go to [email or web address] 2. Click on the First Time User? Click Here link in the Sign In box. You will see the New Member Sign Up page. 3. Enter your ForMune Access Code exactly as it appears below. You will not need to use this code after youve completed the sign-up process.  If you do not sign up before the expiration date, you must request a new code. · KienVe Access Code: [email or web address] Expires: 9/4/2018  2:09 PM 
 
4. Enter the last four digits of your Social Security Number (xxxx) and Date of Birth (mm/dd/yyyy) as indicated and click Submit. You will be taken to the next sign-up page. 5. Create a KienVe ID. This will be your KienVe login ID and cannot be changed, so think of one that is secure and easy to remember. 6. Create a KienVe password. You can change your password at any time. 7. Enter your Password Reset Question and Answer. This can be used at a later time if you forget your password. 8. Enter your e-mail address. You will receive e-mail notification when new information is available in [address] 9. Click Sign Up. You can now view and download portions of your medical record. 10. Click the Download Summary menu link to download a portable copy of your medical information. If you have questions, please visit the Frequently Asked Questions section of the KienVe website. Remember, KienVe is NOT to be used for urgent needs. For medical emergencies, dial 911. Now available from your iPhone and Android! Please provide this summary of care documentation to your next provider. Your primary care clinician is listed as Phys Other. If you have any questions after today's visit, please call [phone].

## 2018-06-08 LAB — VIT B1 BLD-SCNC: 70.5 NMOL/L (ref 66.5–200)

## 2018-06-11 NOTE — OP NOTES
[facility]  OPERATIVE REPORT    Carmen Jefferson  MR#: [id number]  : 1980  ACCOUNT #: [de-identified]   DATE OF SERVICE: 2018    PREOPERATIVE DIAGNOSIS:  Patient 2 months postoperative from gastric bypass procedure with poor p.o. progression. POSTOPERATIVE DIAGNOSIS:   Patient 2 months postoperative from gastric bypass procedure with poor p.o. progression with possible stricture at gastrojejunal anastomosis. PROCEDURE PERFORMED:  Upper GI study with barium . ESTIMATED BLOOD LOSS:  None. SPECIMENS REMOVED:  None. ANESTHESIA:  None. IMPLANTS:  None. COMPLICATIONS:  None. SURGEON:  Jeyson Moore MD    ASSISTANT:  None. STATEMENT OF MEDICAL NECESSITY:  The patient is a patient of mine who is 2 months out from a gastric bypass procedure. She has had very poor progression with p.o. intake, and she is here today for upper GI study to assess for gastric stricture. DESCRIPTION OF PROCEDURE:  The patient was brought to our unit where she was given  thin barium. Upon swallowing barium, she was noted to have normal peristalsis of her esophagus. She had prompt filling of the distal esophagus with tapering through the gastroesophageal junction. Contrast then filled the gastric pouch which were very small in nature. Contrast flowed through the gastrojejunal anastomosis. There was some delay of contrast flowing through the anastomosis consistent with a possible small stricture. Contrast entered the Adelfo limb which was nondilated and normal.  At this juncture, we are going to subject the patient to an endoscopy for the diagnosis of possible stricture.       Andrey Mcmahan       AT / Marielos Alvarado  D: 2018 13:07     T: 2018 13:36  JOB #: [id number]

## 2018-06-11 NOTE — H&P
EGD - History and Physical    Subjective: The patient is a 43 y.o. obese female who is 2 months post gastric bypass with poor PO progression with symptoms of possible vitamin deficiencies. A recent UGI showed stricture. She understands the need for EGD . Patient Active Problem List    Diagnosis Date Noted    Hypokalemia 2018    Hypomagnesemia 2018    Nausea & vomiting 2018    Dysphagia 2018    Intestinal malabsorption 2018    S/P gastric bypass 2018    Morbid obesity with BMI of 45.0-49.9, adult ([address] 2018    Morbid obesity ([facility] [address]     Morbid obesity with body mass index (BMI) of 40.0 to 49.9 (HCC)     Constipation     Functional dyspepsia     History of colon polyps       Past Surgical History:   Procedure Laterality Date    COLONOSCOPY      X 3    DELIVERY       X 3    HX DILATION AND CURETTAGE      HX OTHER SURGICAL      scar resection back of head    HX TUBAL LIGATION      HX UROLOGICAL  2001    stent    IR URETERAL STENT PLACEMENT        Social History   Substance Use Topics    Smoking status: Never Smoker    Smokeless tobacco: Never Used    Alcohol use Yes      Comment: rare      Family History   Problem Relation Age of Onset    Hypertension Mother     Obesity Mother     Hypertension Father       Current Outpatient Prescriptions   Medication Sig Dispense Refill    cholecalciferol, vitamin D3, (VITAMIN D3 PO) Take 1 Tab by mouth daily.  promethazine (PHENERGAN) 12.5 mg tablet Take  by mouth every six (6) hours as needed for Nausea.  potassium chloride (KLOR-CON) 10 mEq tablet Take 1 Tab by mouth two (2) times a day. 60 Tab 0    magnesium chloride (SLOW-MAG) 71.5 mg tablet Take 1 Tab by mouth two (2) times a day. 60 Tab 0    pantoprazole (PROTONIX) 20 mg tablet Take 1 Tab by mouth daily. 60 Tab 3    polyethylene glycol (MIRALAX) 17 gram/dose powder Take 17 g by mouth daily.  1700 g 3    Biotin 2,500 mcg cap Take by mouth. Allergies   Allergen Reactions    Niacin Other (comments)    Morphine Hives and Itching          Review of Systems:        General - No history or complaints of unexpected fever, chills, or weight loss  Head/Neck - No history or complaints of headache, diplopia, dysphagia, hearing loss  Cardiac - No history or complaints of chest pain, palpitations, murmur, or shortness of breath  Pulmonary - No history or complaints of shortness of breath, productive cough, hemoptysis  Gastrointestinal - No history or complaints of reflux,  abdominal pain, obstipation/constipation, blood per rectum  Genitourinary - No history or complaints of hematuria/dysuria, stress urinary incontinence symptoms, or renal lithiasis  Musculoskeletal - No history or complaints of joint pain or muscular weakness  Hematologic - No history or complaints of bleeding disorders, blood transfusions, sickle cell anemia  Neurologic - No history or complaints of  migraine headaches, seizure activity, syncopal episodes, TIA or stroke  Integumentary - No history or complaints of rashes, abnormal nevi, skin cancer  Gynecological - unremarkable    Objective: There were no vitals taken for this visit.       Physical Examination: General appearance - alert, well appearing, and in no distress and oriented to person, place, and time  Mental status - alert, oriented to person, place, and time, normal mood, behavior, speech, dress, motor activity, and thought processes  Eyes - pupils equal and reactive, extraocular eye movements intact, sclera anicteric, left eye normal, right eye normal  Ears - right ear normal, left ear normal  Nose - normal and patent, no erythema, discharge or polyps  Mouth - mucous membranes moist, pharynx normal without lesions  Neck - supple, no significant adenopathy  Lymphatics - no palpable lymphadenopathy, no hepatosplenomegaly  Chest - clear to auscultation, no wheezes, rales or rhonchi, symmetric air entry  Heart - normal rate, regular rhythm, normal S1, S2, no murmurs, rubs, clicks or gallops  Abdomen - soft, nontender, nondistended, no masses or organomegaly  Back exam - full range of motion, no tenderness, palpable spasm or pain on motion  Neurological - alert, oriented, normal speech, no focal findings or movement disorder noted  Musculoskeletal - no joint tenderness, deformity or swelling  Extremities - peripheral pulses normal, no pedal edema, no clubbing or cyanosis  Skin - normal coloration and turgor, no rashes, no suspicious skin lesions noted    Labs / Preoperative Evaluations:     Recent Results (from the past 1008 hour(s))   VITAMIN B1, WHOLE BLOOD    Collection Time: 06/06/18  3:49 PM   Result Value Ref Range    Vitamin B1 70.5 66.5 - 068.6 nmol/L   METABOLIC PANEL, COMPREHENSIVE    Collection Time: 06/06/18  3:49 PM   Result Value Ref Range    Sodium 142 136 - 145 mmol/L    Potassium 4.5 3.5 - 5.5 mmol/L    Chloride 104 100 - 108 mmol/L    CO2 24 21 - 32 mmol/L    Anion gap 14 3.0 - 18 mmol/L    Glucose 75 74 - 99 mg/dL    BUN 11 7.0 - 18 MG/DL    Creatinine 0.68 0.6 - 1.3 MG/DL    BUN/Creatinine ratio 16 12 - 20      GFR est AA >60 >60 ml/min/1.73m2    GFR est non-AA >60 >60 ml/min/1.73m2    Calcium 9.1 8.5 - 10.1 MG/DL    Bilirubin, total 0.6 0.2 - 1.0 MG/DL    ALT (SGPT) 28 13 - 56 U/L    AST (SGOT) 23 15 - 37 U/L    Alk. phosphatase 66 45 - 117 U/L    Protein, total 8.2 6.4 - 8.2 g/dL    Albumin 4.1 3.4 - 5.0 g/dL    Globulin 4.1 (H) 2.0 - 4.0 g/dL    A-G Ratio 1.0 0.8 - 1.7     MAGNESIUM    Collection Time: 06/06/18  3:49 PM   Result Value Ref Range    Magnesium 1.8 1.6 - 2.6 mg/dL       Assessment:     2 months post gastric bypass with stricture on UGI    Plan:     EGD    Plan for EGD. She understands the risks, benefits and alternatives of the EGD.   She understands the risk include but are not limited to; death, DVT/PE, damage to surrounding structures, perforation of the GI tract, equipment malfunction, bleeding, and infection. She understands all of the above and wishes to proceed with EGD.         Signed By: Deberah Harada, PA     June 11, 2018

## 2018-06-12 ENCOUNTER — ANESTHESIA (OUTPATIENT)
Dept: ENDOSCOPY | Age: 38
End: 2018-06-12
Payer: OTHER GOVERNMENT

## 2018-06-12 ENCOUNTER — HOSPITAL ENCOUNTER (OUTPATIENT)
Age: 38
Setting detail: OUTPATIENT SURGERY
Discharge: HOME | End: 2018-06-12
Attending: SPECIALIST | Admitting: SPECIALIST
Payer: OTHER GOVERNMENT

## 2018-06-12 ENCOUNTER — TELEPHONE (OUTPATIENT)
Dept: SURGERY | Age: 38
End: 2018-06-12

## 2018-06-12 ENCOUNTER — ANESTHESIA EVENT (OUTPATIENT)
Dept: ENDOSCOPY | Age: 38
End: 2018-06-12
Payer: OTHER GOVERNMENT

## 2018-06-12 VITALS
WEIGHT: 195 LBS | DIASTOLIC BLOOD PRESSURE: 84 MMHG | OXYGEN SATURATION: 99 % | SYSTOLIC BLOOD PRESSURE: 122 MMHG | HEART RATE: 80 BPM | HEIGHT: 58 IN | RESPIRATION RATE: 16 BRPM | BODY MASS INDEX: 40.93 KG/M2 | TEMPERATURE: 98.1 F

## 2018-06-12 LAB — HCG SERPL QL: NEGATIVE

## 2018-06-12 PROCEDURE — 76060000031 HC ANESTHESIA FIRST 0.5 HR: Performed by: SPECIALIST

## 2018-06-12 PROCEDURE — 77030009426 HC FCPS BIOP ENDOSC BSC -B: Performed by: SPECIALIST

## 2018-06-12 PROCEDURE — 74011000250 HC RX REV CODE- 250: Performed by: SPECIALIST

## 2018-06-12 PROCEDURE — 77030038020 HC MANFLD NEPTUNE STRY -B: Performed by: SPECIALIST

## 2018-06-12 PROCEDURE — 84703 CHORIONIC GONADOTROPIN ASSAY: CPT | Performed by: SPECIALIST

## 2018-06-12 PROCEDURE — C1726 CATH, BAL DIL, NON-VASCULAR: HCPCS | Performed by: SPECIALIST

## 2018-06-12 PROCEDURE — 74011250636 HC RX REV CODE- 250/636: Performed by: SPECIALIST

## 2018-06-12 PROCEDURE — 76040000019: Performed by: SPECIALIST

## 2018-06-12 PROCEDURE — 74011000250 HC RX REV CODE- 250

## 2018-06-12 PROCEDURE — 74011250636 HC RX REV CODE- 250/636

## 2018-06-12 PROCEDURE — 36415 COLL VENOUS BLD VENIPUNCTURE: CPT | Performed by: SPECIALIST

## 2018-06-12 PROCEDURE — 77030020263 HC SOL INJ SOD CL0.9% LFCR 1000ML: Performed by: SPECIALIST

## 2018-06-12 RX ORDER — FENTANYL CITRATE 50 UG/ML
50 INJECTION, SOLUTION INTRAMUSCULAR; INTRAVENOUS
Status: CANCELLED | OUTPATIENT
Start: 2018-06-12

## 2018-06-12 RX ORDER — DEXTROSE 50 % IN WATER (D50W) INTRAVENOUS SYRINGE
25-50 AS NEEDED
Status: CANCELLED | OUTPATIENT
Start: 2018-06-12

## 2018-06-12 RX ORDER — IBUPROFEN 200 MG
200 CAPSULE ORAL 2 TIMES DAILY
COMMUNITY
End: 2021-01-08

## 2018-06-12 RX ORDER — FLUMAZENIL 0.1 MG/ML
0.2 INJECTION INTRAVENOUS
Status: CANCELLED | OUTPATIENT
Start: 2018-06-12

## 2018-06-12 RX ORDER — LIDOCAINE HYDROCHLORIDE 20 MG/ML
INJECTION, SOLUTION EPIDURAL; INFILTRATION; INTRACAUDAL; PERINEURAL AS NEEDED
Status: DISCONTINUED | OUTPATIENT
Start: 2018-06-12 | End: 2018-06-12 | Stop reason: HOSPADM

## 2018-06-12 RX ORDER — MAGNESIUM SULFATE 100 %
4 CRYSTALS MISCELLANEOUS AS NEEDED
Status: CANCELLED | OUTPATIENT
Start: 2018-06-12

## 2018-06-12 RX ORDER — SODIUM CHLORIDE 0.9 % (FLUSH) 0.9 %
5-10 SYRINGE (ML) INJECTION AS NEEDED
Status: CANCELLED | OUTPATIENT
Start: 2018-06-12

## 2018-06-12 RX ORDER — SODIUM CHLORIDE, SODIUM LACTATE, POTASSIUM CHLORIDE, CALCIUM CHLORIDE 600; 310; 30; 20 MG/100ML; MG/100ML; MG/100ML; MG/100ML
1000 INJECTION, SOLUTION INTRAVENOUS CONTINUOUS
Status: CANCELLED | OUTPATIENT
Start: 2018-06-12

## 2018-06-12 RX ORDER — PROPOFOL 10 MG/ML
INJECTION, EMULSION INTRAVENOUS AS NEEDED
Status: DISCONTINUED | OUTPATIENT
Start: 2018-06-12 | End: 2018-06-12 | Stop reason: HOSPADM

## 2018-06-12 RX ORDER — NALOXONE HYDROCHLORIDE 0.4 MG/ML
0.1 INJECTION, SOLUTION INTRAMUSCULAR; INTRAVENOUS; SUBCUTANEOUS AS NEEDED
Status: CANCELLED | OUTPATIENT
Start: 2018-06-12

## 2018-06-12 RX ORDER — SUCRALFATE 1 G/10ML
1 SUSPENSION ORAL 4 TIMES DAILY
Qty: 420 ML | Refills: 1 | Status: SHIPPED | OUTPATIENT
Start: 2018-06-12 | End: 2018-07-03

## 2018-06-12 RX ORDER — SODIUM CHLORIDE, SODIUM LACTATE, POTASSIUM CHLORIDE, CALCIUM CHLORIDE 600; 310; 30; 20 MG/100ML; MG/100ML; MG/100ML; MG/100ML
125 INJECTION, SOLUTION INTRAVENOUS CONTINUOUS
Status: DISCONTINUED | OUTPATIENT
Start: 2018-06-12 | End: 2018-06-12 | Stop reason: HOSPADM

## 2018-06-12 RX ADMIN — PROPOFOL 100 MG: 10 INJECTION, EMULSION INTRAVENOUS at 15:32

## 2018-06-12 RX ADMIN — PROPOFOL 100 MG: 10 INJECTION, EMULSION INTRAVENOUS at 15:34

## 2018-06-12 RX ADMIN — LIDOCAINE HYDROCHLORIDE 40 MG: 20 INJECTION, SOLUTION EPIDURAL; INFILTRATION; INTRACAUDAL; PERINEURAL at 15:33

## 2018-06-12 RX ADMIN — PROPOFOL 20 MG: 10 INJECTION, EMULSION INTRAVENOUS at 15:40

## 2018-06-12 RX ADMIN — SODIUM CHLORIDE, SODIUM LACTATE, POTASSIUM CHLORIDE, AND CALCIUM CHLORIDE 125 ML/HR: 600; 310; 30; 20 INJECTION, SOLUTION INTRAVENOUS at 13:22

## 2018-06-12 NOTE — PERIOP NOTES
AVS medication list reviewed and no duplicates noted. Discharge information reviewed with patient and spouse; verbalized understanding of discharge and follow up information.

## 2018-06-12 NOTE — IP AVS SNAPSHOT
[address] [facility] 
 
 
 [address] [facility] [address] 
[phone] Patient: Ayanna Mcclellan MRN: [id number] :1980 About your hospitalization You were admitted on:  2018 You last received care in the:  THE [facility] ENDOSCOPY You were discharged on:  2018 Why you were hospitalized Your primary diagnosis was:  Not on File Follow-up Information Follow up With Details Comments Contact Info Milton Black MD   Patient can only remember the practice name and not the physician Debra Celis MD Go in 2 week(s)  [address] [facility] [address] [facility] [address] 
[phone] Your Scheduled Appointments   7:30 AM EDT Office Visit with CHIKIS Isaacs [facility] [place] ([facility])  
 [address] [facility] [address] [facility] [address]  
[phone] Discharge Orders None A check lillie indicates which time of day the medication should be taken. My Medications START taking these medications Instructions Each Dose to Equal  
 Morning Noon Evening Bedtime  
 sucralfate 100 mg/mL suspension Commonly known as:  Fredda Indira Your last dose was: Your next dose is: Take 10 mL by mouth four (4) times daily. 1 g CONTINUE taking these medications Instructions Each Dose to Equal  
 Morning Noon Evening Bedtime Biotin 2,500 mcg Cap Your last dose was: Your next dose is: Take  by mouth.  
     
   
   
   
  
 calcium citrate 200 mg (950 mg) tablet Your last dose was: Your next dose is: Take 200 mg by mouth two (2) times a day. 200 mg  
    
   
   
   
  
 magnesium chloride 71.5 mg tablet Commonly known as:  SLOW-MAG Your last dose was: Your next dose is: Take 1 Tab by mouth two (2) times a day. 71.5 mg  
    
   
   
   
  
 pantoprazole 20 mg tablet Commonly known as:  PROTONIX Your last dose was: Your next dose is: Take 1 Tab by mouth daily. 20 mg  
    
   
   
   
  
 polyethylene glycol 17 gram/dose powder Commonly known as:  Bright Ravena Your last dose was: Your next dose is: Take 17 g by mouth daily. 17 g  
    
   
   
   
  
 potassium chloride 10 mEq tablet Commonly known as:  KLOR-CON Your last dose was: Your next dose is: Take 1 Tab by mouth two (2) times a day. 10 mEq  
    
   
   
   
  
 promethazine 12.5 mg tablet Commonly known as:  PHENERGAN Your last dose was: Your next dose is: Take  by mouth every six (6) hours as needed for Nausea. VITAMIN B12 PO Your last dose was: Your next dose is: Take  by mouth two (2) times a day. VITAMIN D3 PO Your last dose was: Your next dose is: Take 1 Tab by mouth daily. 1 Tab Where to Get Your Medications Information on where to get these meds will be given to you by the nurse or doctor. ! Ask your nurse or doctor about these medications  
  sucralfate 100 mg/mL suspension Discharge Instructions [facility] [id number] 
1980 EGD DISCHARGE INSTRUCTIONS Discomfort: 
Sore throat- throat lozenges or warm salt water gargle 
redness at IV site- apply warm compress to area; if redness or soreness persist- contact your physician Gaseous discomfort- walking, belching will help relieve any discomfort You should not operate a vehicle for 12 hours You should note engage in an occupation involving machinery or appliances for rest of today You may note drink alcoholic beverages for at least 12 hours Avoid making any critical decisions for at least 24 hour DIET You may resume your regular diet  however -  remember your colon is empty and a heavy meal will produce gas. Avoid these foods:  vegetables, fried / greasy foods, carbonated drinks ACTIVITY You may resume your normal daily activities Spend the remainder of the day resting -  avoid any strenuous activity. CALL M.D. ANY SIGN OF Increasing pain, nausea, vomiting Abdominal distension (swelling) New increased bleeding (oral or rectal) Fever (chills) Pain in chest area Bloody discharge from nose or mouth Shortness of breath Follow-up Instructions: 
 Dr Jose Patrick will call you in one to two weeks. If you do not hear from him, please call his office [phone]. Zuly Cabrera [phone] 
1980 DISCHARGE SUMMARY from Nurse PATIENT INSTRUCTIONS: 
 
 
F-face looks uneven A-arms unable to move or move unevenly S-speech slurred or non-existent T-time-call 911 as soon as signs and symptoms begin-DO NOT go Back to bed or wait to see if you get better-TIME IS BRAIN. Warning Signs of HEART ATTACK Call 911 if you have these symptoms: 
? Chest discomfort. Most heart attacks involve discomfort in the center of the chest that lasts more than a few minutes, or that goes away and comes back. It can feel like uncomfortable pressure, squeezing, fullness, or pain. ? Discomfort in other areas of the upper body. Symptoms can include pain or discomfort in one or both arms, the back, neck, jaw, or stomach. ? Shortness of breath with or without chest discomfort. ? Other signs may include breaking out in a cold sweat, nausea, or lightheadedness. Don't wait more than five minutes to call [address]! Fast action can save your life. Calling 911 is almost always the fastest way to get lifesaving treatment. Emergency Medical Services staff can begin treatment when they arrive  up to an hour sooner than if someone gets to the hospital by car. The discharge information has been reviewed with the patient and caregiver. The patient and caregiver verbalized understanding. Discharge medications reviewed with the patient and caregiver and appropriate educational materials and side effects teaching were provided. Patient armband removed and shredded. ___________________________________________________________________________________________________________________________________ Introducing [facility]! [employer] introduces [employer] patient portal. Now you can access parts of your medical record, email your doctor's office, and request medication refills online. 1. In your internet browser, go to [email or web address] 2. Click on the First Time User? Click Here link in the Sign In box. You will see the New Member Sign Up page. 3. Enter your [employer]Vector Access Code exactly as it appears below. You will not need to use this code after youve completed the sign-up process. If you do not sign up before the expiration date, you must request a new code. · Retail[employer] Access Code: [email or web address] Expires: 9/4/2018  2:09 PM 
 
4. Enter the last four digits of your Social Security Number (xxxx) and Date of Birth (mm/dd/yyyy) as indicated and click Submit. You will be taken to the next sign-up page. 5. Create a RetailVector ID. This will be your RetailVector login ID and cannot be changed, so think of one that is secure and easy to remember. 6. Create a [employer] password. You can change your password at any time. 7. Enter your Password Reset Question and Answer.  This can be used at a later time if you forget your password. 8. Enter your e-mail address. You will receive e-mail notification when new information is available in [address] 9. Click Sign Up. You can now view and download portions of your medical record. 10. Click the Download Summary menu link to download a portable copy of your medical information. If you have questions, please visit the Frequently Asked Questions section of the [employer]t website. Remember, [employer] is NOT to be used for urgent needs. For medical emergencies, dial 911. Now available from your iPhone and Android! Introducing Rogerio Arroyo As a [employer] patient, I wanted to make you aware of our electronic visit tool called Rogerio Arroyo. [employer] 24/7 allows you to connect within minutes with a medical provider 24 hours a day, seven days a week via a mobile device or tablet or logging into a secure website from your computer. You can access Rogerio Arroyo from anywhere in the [place]. A virtual visit might be right for you when you have a simple condition and feel like you just dont want to get out of bed, or cant get away from work for an appointment, when your regular [employer] provider is not available (evenings, weekends or holidays), or when youre out of town and need minor care. Electronic visits cost only $49 and if the [employer] 24/7 provider determines a prescription is needed to treat your condition, one can be electronically transmitted to a nearby pharmacy*. Please take a moment to enroll today if you have not already done so. The enrollment process is free and takes just a few minutes. To enroll, please download the [employer] 24/7 ayden to your tablet or phone, or visit [email or web address] to enroll on your computer.    
And, as an [address] [place] [address] patient with a [employer] account, the results of your visits will be scanned into your electronic medical record and your primary care provider will be able to view the scanned results. We urge you to continue to see your regular [employer] provider for your ongoing medical care. And while your primary care provider may not be the one available when you seek a [facility] virtual visit, the peace of mind you get from getting a real diagnosis real time can be priceless. For more information on [employer], view our Frequently Asked Questions (FAQs) at [email or web address]. Sincerely, 
 
James Preciado MD 
Chief Medical Officer Spike Rhodes *:  certain medications cannot be prescribed via [facility] Unresulted tests-please follow up with your PCP on these results Procedure/Test Authorizing Provider HCG QL SERUM Cece Pardo MD  
  
Providers Seen During Your Hospitalization Provider Specialty Primary office phone Cece Pardo MD General Surgery [phone] Your Primary Care Physician (PCP) Primary Care Physician Office Phone Office Fax OTHER, PHYS ** None ** ** None ** You are allergic to the following Allergen Reactions Niacin Other (comments) Morphine Hives Itching Recent Documentation Height Weight BMI OB Status Smoking Status 1.473 m 88.5 kg 40.76 kg/m2 Having regular periods Never Smoker Emergency Contacts Name Discharge Info Relation Home Work Mobile Troy King DISCHARGE CAREGIVER [3] Spouse [3]   [phone] Patient Belongings The following personal items are in your possession at time of discharge: 
  Dental Appliances: None         Home Medications: None   Jewelry:  (returned to pt. from security)  Clothing:  (belongings from locker returned and given to Ana Jordan)    Other Valuables:  (locker belongings returned to  in preop room with pt. there also)  Personal Items Sent to Safe:  (returned to pt. from security) Please provide this summary of care documentation to your next provider. Signatures-by signing, you are acknowledging that this After Visit Summary has been reviewed with you and you have received a copy. Patient Signature:  ____________________________________________________________ Date:  ____________________________________________________________  
  
Larri Hazard Provider Signature:  ____________________________________________________________ Date:  ____________________________________________________________

## 2018-06-12 NOTE — PROCEDURES
Esophagogastroduodenoscopy Procedure Note    Indications: 2 months post gastric bypass with poor PO progression and stricture on UGI    Anesthesia/Sedation: MAC    Pre-Procedure Exam:  Airway: clear   Heart: normal S1and S2    Lungs: clear bilateral  Abdomen: soft, nontender, bowel sounds present and normal in all quadrants   Mental Status: awake, alert, and oriented to person, place, and time      Procedure in Detail:  Informed consent was obtained for the procedure, including conscious sedation. Risks of pancreatitis, infection, perforation, hemorrhage, adverse drug reaction, and aspiration were discussed. The patient was placed in the left lateral decubitus position. Based on the pre-procedure assessment, including review of the patient's medical history, medications, allergies, and review of systems, he had been deemed to be an appropriate candidate for moderate sedation; he was therefore sedated with the medications listed above. He was monitored continuously with electrocardiogram tracing, pulse oximetry, blood pressure monitoring, and direct observation. The [id number] gastroscope was inserted into the mouth and advanced under direct vision to proximal adelfo limb. A careful inspection was made as the gastroscope was withdrawn, including a retroflexed view of the proximal stomach; findings and interventions are described below. Appropriate photodocumentation was obtained. Findings:     Oropharynx - normal mucosa without abnormalities  Esophagus - normal mucosa without webs, rings, or strictures, no esophagitis or ulcers  Gastric pouch - normal size and configuration with normal mucosa without evidence of ulcers   Anastomosis - 9 mm stricture (dilated to 14 mm) without ulcers or other anatomical abnormalities  Adelfo limb - normal mucosa without ulcers or obstruction     Therapies:    Dilation with balloon    Specimens: No specimens were collected.            Complications:   None; patient tolerated the procedure well. EBL:  None           Attending Attestation:  I performed the procedure. Recommendations:  - Follow symptoms. Signed by:  Aida Avina MD

## 2018-06-12 NOTE — INTERVAL H&P NOTE
H&P Update:  Nellie Ahn was seen and examined. History and physical has been reviewed. The patient has been examined.  There have been no significant clinical changes since the completion of the originally dated History and Physical.    Signed By: Noemí Ge MD     June 12, 2018 2:05 PM

## 2018-06-12 NOTE — ANESTHESIA PREPROCEDURE EVALUATION
Anesthetic History   No history of anesthetic complications  PONV          Review of Systems / Medical History  Patient summary reviewed, nursing notes reviewed and pertinent labs reviewed    Pulmonary  Within defined limits                 Neuro/Psych   Within defined limits           Cardiovascular  Within defined limits                Exercise tolerance: >4 METS     GI/Hepatic/Renal  Within defined limits   GERD: well controlled           Endo/Other        Morbid obesity     Other Findings              Physical Exam    Airway  Mallampati: II  TM Distance: 4 - 6 cm  Neck ROM: normal range of motion   Mouth opening: Normal     Cardiovascular    Rhythm: regular  Rate: normal         Dental  No notable dental hx       Pulmonary  Breath sounds clear to auscultation               Abdominal  GI exam deferred       Other Findings            Anesthetic Plan    ASA: 2  Anesthesia type: MAC          Induction: Intravenous  Anesthetic plan and risks discussed with: Patient

## 2018-06-12 NOTE — ANESTHESIA POSTPROCEDURE EVALUATION
Post-Anesthesia Evaluation & Assessment    Visit Vitals    /75 (BP 1 Location: Left arm, BP Patient Position: At rest)    Pulse 67    Temp 36.7 °C (98.1 °F)    Resp 16    Ht 4' 10\" (1.473 m)    Wt 88.5 kg (195 lb)    SpO2 99%    BMI 40.76 kg/m2       No untreated/active PONV    Post-operative hydration adequate. Adequate post-operative analgesia per PACU discharge criteria    Mental status & level of consciousness: alert and oriented x 3    Respiratory status: patent unassisted airway     No apparent anesthetic complications requiring additional post-anesthetic care    Patient has met all discharge requirements.             Juan Jose Box MD

## 2018-06-12 NOTE — PERIOP NOTES
Patient c/o lower back pain and \"burning\" with urination. Requesting urine sample to be sent to lab, and states, \"I had urine obtained for a pregnancy, so can you just see if I have a UTI. \"  Explained to patient that there is no order, and that Dr. Shira King is no longer in the hospital for order to be placed. Patient aware that she needs to follow up outpatient.

## 2018-06-12 NOTE — PERIOP NOTES
Reviewed PTA medication list with patient/caregiver and patient/caregiver denies any additional medications. Patient  will be arriving to hospital later to pick patient up; Patient admits to having care 24 hours after procedure.

## 2018-06-21 ENCOUNTER — TELEPHONE (OUTPATIENT)
Dept: SURGERY | Age: 38
End: 2018-06-21

## 2018-06-21 NOTE — TELEPHONE ENCOUNTER
Spoke with pt on June 21, 2018 and she stated she is still having the reflux issue she was having prior to have her EGD. Will notify the provider and scheduled her an office f/u appt on June 27, 2018.

## 2018-06-27 ENCOUNTER — OFFICE VISIT (OUTPATIENT)
Dept: SURGERY | Age: 38
End: 2018-06-27

## 2018-06-27 VITALS
BODY MASS INDEX: 40.41 KG/M2 | WEIGHT: 192.5 LBS | HEIGHT: 58 IN | DIASTOLIC BLOOD PRESSURE: 69 MMHG | HEART RATE: 74 BPM | SYSTOLIC BLOOD PRESSURE: 112 MMHG | RESPIRATION RATE: 16 BRPM | OXYGEN SATURATION: 100 %

## 2018-06-27 DIAGNOSIS — K90.9 INTESTINAL MALABSORPTION, UNSPECIFIED TYPE: ICD-10-CM

## 2018-06-27 DIAGNOSIS — R13.10 DYSPHAGIA, UNSPECIFIED TYPE: Primary | ICD-10-CM

## 2018-06-27 DIAGNOSIS — Z98.84 S/P GASTRIC BYPASS: ICD-10-CM

## 2018-06-27 RX ORDER — ONDANSETRON 4 MG/1
4 TABLET, FILM COATED ORAL
COMMUNITY
End: 2018-10-16

## 2018-06-27 NOTE — PROGRESS NOTES
Subjective:     Danny Ruffin  is a 43 y.o. female who presents for follow-up about 2.75 months following laparoscopic gastric bypass surgery. She has lost a total of 38 pounds (34% of EBW) since surgery. Body mass index is 40.23 kg/(m^2). [facility] Pain assessment - 0/10    Surgery related complication: NA - pt did have 9mm stricture at the 2 month lillie requiring dilation to 14mm       She reports only slightly better from her symptoms preEGD. She notes she vomits less but has \"the foamies\" at least once a day and still has to greatly limit her diet and denies abdominal pain, diarrhea and difficulty breathing. The patient's exercise level: moderately active. Changes in her medical history and medications have been reviewed.     Patient Active Problem List   Diagnosis Code    Morbid obesity ([facility] [address] E66.01    Morbid obesity with body mass index (BMI) of 40.0 to 49.9 ([facility]) E66.01    Constipation K59.00    Functional dyspepsia K30    History of colon polyps Z86.010    Morbid obesity with BMI of 45.0-49.9, adult ([facility] [address] E66.01, Z68.42    Intestinal malabsorption K90.9    S/P gastric bypass Z98.84    Hypokalemia E87.6    Hypomagnesemia E83.42    Nausea & vomiting R11.2    Dysphagia R13.10     Past Medical History:   Diagnosis Date    Constipation     Functional dyspepsia     uses OTC meds    GERD (gastroesophageal reflux disease)     History of colon polyps     Morbid obesity ([facility] [address] [facility]     Morbid obesity with body mass index (BMI) of 40.0 to 49.9 ([facility])     Nausea & vomiting 2018     Past Surgical History:   Procedure Laterality Date    COLONOSCOPY      X 3    DELIVERY       X 3    HX DILATION AND CURETTAGE      HX OTHER SURGICAL      scar resection back of head    HX TUBAL LIGATION      HX UROLOGICAL  2001    stent    IR URETERAL STENT PLACEMENT         Objective:     Visit Vitals    /69 (BP 1 Location: Right arm, BP Patient Position: Sitting)    Pulse 74    Resp 16    Ht 4' 10\" (1.473 m)    Wt 87.3 kg (192 lb 8 oz)    SpO2 100%    BMI 40.23 kg/m2        Physical Exam:    General:  alert, cooperative, no distress, appears stated age   Pulm: clear to auscultation bilaterally   Heart:  Regular rate and rhythm   Abdomen:   abdomen is soft without significant tenderness, masses, organomegaly or guarding; Incisions: healing well, no significant drainage       Assessment:     1. History of Morbid obesity, status post  laparoscopic gastric bypass surgery. The patient will need a second EGD with likely dilation. She can tolerate shakes without issue. She will cont to work on protein intake and warm liquids in the AM.  If she does not improve her solid PO intake over the next 7-10 days we will plan for a repeat EGD. Plan:     1. Remember to measure portions, continue low carbohydrate diet  2. Cont liquid protein   3. Remember vitamin supplements. 4. Exercise regimen appears adequate. 5. Attend support group  6. Follow-up prn in prep for EGD.   7. The patient understands the plan of action

## 2018-06-27 NOTE — PATIENT INSTRUCTIONS
Body Mass Index: Care Instructions  Your Care Instructions    Body mass index (BMI) can help you see if your weight is raising your risk for health problems. It uses a formula to compare how much you weigh with how tall you are. · A BMI lower than 18.5 is considered underweight. · A BMI between 18.5 and 24.9 is considered healthy. · A BMI between 25 and 29.9 is considered overweight. A BMI of 30 or higher is considered obese. If your BMI is in the normal range, it means that you have a lower risk for weight-related health problems. If your BMI is in the overweight or obese range, you may be at increased risk for weight-related health problems, such as high blood pressure, heart disease, stroke, arthritis or joint pain, and diabetes. If your BMI is in the underweight range, you may be at increased risk for health problems such as fatigue, lower protection (immunity) against illness, muscle loss, bone loss, hair loss, and hormone problems. BMI is just one measure of your risk for weight-related health problems. You may be at higher risk for health problems if you are not active, you eat an unhealthy diet, or you drink too much alcohol or use tobacco products. Follow-up care is a key part of your treatment and safety. Be sure to make and go to all appointments, and call your doctor if you are having problems. It's also a good idea to know your test results and keep a list of the medicines you take. How can you care for yourself at home? · Practice healthy eating habits. This includes eating plenty of fruits, vegetables, whole grains, lean protein, and low-fat dairy. · If your doctor recommends it, get more exercise. Walking is a good choice. Bit by bit, increase the amount you walk every day. Try for at least 30 minutes on most days of the week. · Do not smoke. Smoking can increase your risk for health problems. If you need help quitting, talk to your doctor about stop-smoking programs and medicines. These can increase your chances of quitting for good. · Limit alcohol to 2 drinks a day for men and 1 drink a day for women. Too much alcohol can cause health problems. If you have a BMI higher than 25  · Your doctor may do other tests to check your risk for weight-related health problems. This may include measuring the distance around your waist. A waist measurement of more than 40 inches in men or 35 inches in women can increase the risk of weight-related health problems. · Talk with your doctor about steps you can take to stay healthy or improve your health. You may need to make lifestyle changes to lose weight and stay healthy, such as changing your diet and getting regular exercise. If you have a BMI lower than 18.5  · Your doctor may do other tests to check your risk for health problems. · Talk with your doctor about steps you can take to stay healthy or improve your health. You may need to make lifestyle changes to gain or maintain weight and stay healthy, such as getting more healthy foods in your diet and doing exercises to build muscle. Where can you learn more? Go to http://jorge-prisca.info/. Enter S176 in the search box to learn more about \"Body Mass Index: Care Instructions. \"  Current as of: October 13, 2016  Content Version: 11.4  © 2071-2900 [employer], [employer]. Care instructions adapted under license by [employer] (which disclaims liability or warranty for this information). If you have questions about a medical condition or this instruction, always ask your healthcare professional. [address] any warranty or liability for your use of this information.

## 2018-07-03 ENCOUNTER — DOCUMENTATION ONLY (OUTPATIENT)
Dept: SURGERY | Age: 38
End: 2018-07-03

## 2018-07-03 ENCOUNTER — TELEPHONE (OUTPATIENT)
Dept: SURGERY | Age: 38
End: 2018-07-03

## 2018-07-03 NOTE — PROGRESS NOTES
Dr. Cristian Martinez please advise,    Patient is requesting EGD performed. Advised patient that procedure will be scheduled and arrival time provided. Pt verbalized understanding.

## 2018-07-05 ENCOUNTER — ANESTHESIA (OUTPATIENT)
Dept: ENDOSCOPY | Age: 38
End: 2018-07-05
Payer: OTHER GOVERNMENT

## 2018-07-05 ENCOUNTER — HOSPITAL ENCOUNTER (OUTPATIENT)
Age: 38
Setting detail: OUTPATIENT SURGERY
Discharge: HOME | End: 2018-07-05
Attending: SPECIALIST | Admitting: SPECIALIST
Payer: OTHER GOVERNMENT

## 2018-07-05 ENCOUNTER — ANESTHESIA EVENT (OUTPATIENT)
Dept: ENDOSCOPY | Age: 38
End: 2018-07-05
Payer: OTHER GOVERNMENT

## 2018-07-05 VITALS
SYSTOLIC BLOOD PRESSURE: 127 MMHG | RESPIRATION RATE: 16 BRPM | WEIGHT: 190.19 LBS | DIASTOLIC BLOOD PRESSURE: 70 MMHG | BODY MASS INDEX: 39.92 KG/M2 | HEART RATE: 85 BPM | TEMPERATURE: 97.4 F | HEIGHT: 58 IN | OXYGEN SATURATION: 98 %

## 2018-07-05 LAB — HCG SERPL QL: NEGATIVE

## 2018-07-05 PROCEDURE — 74011000250 HC RX REV CODE- 250: Performed by: SPECIALIST

## 2018-07-05 PROCEDURE — 77030038020 HC MANFLD NEPTUNE STRY -B: Performed by: SPECIALIST

## 2018-07-05 PROCEDURE — 76060000031 HC ANESTHESIA FIRST 0.5 HR: Performed by: SPECIALIST

## 2018-07-05 PROCEDURE — 74011250636 HC RX REV CODE- 250/636

## 2018-07-05 PROCEDURE — 76040000019: Performed by: SPECIALIST

## 2018-07-05 PROCEDURE — 77030009426 HC FCPS BIOP ENDOSC BSC -B: Performed by: SPECIALIST

## 2018-07-05 PROCEDURE — 74011250636 HC RX REV CODE- 250/636: Performed by: SPECIALIST

## 2018-07-05 PROCEDURE — 74011000250 HC RX REV CODE- 250

## 2018-07-05 PROCEDURE — C1726 CATH, BAL DIL, NON-VASCULAR: HCPCS | Performed by: SPECIALIST

## 2018-07-05 PROCEDURE — 77030020263 HC SOL INJ SOD CL0.9% LFCR 1000ML: Performed by: SPECIALIST

## 2018-07-05 PROCEDURE — 84703 CHORIONIC GONADOTROPIN ASSAY: CPT | Performed by: ANESTHESIOLOGY

## 2018-07-05 PROCEDURE — 36415 COLL VENOUS BLD VENIPUNCTURE: CPT | Performed by: ANESTHESIOLOGY

## 2018-07-05 RX ORDER — LIDOCAINE HYDROCHLORIDE 20 MG/ML
INJECTION, SOLUTION EPIDURAL; INFILTRATION; INTRACAUDAL; PERINEURAL AS NEEDED
Status: DISCONTINUED | OUTPATIENT
Start: 2018-07-05 | End: 2018-07-05 | Stop reason: HOSPADM

## 2018-07-05 RX ORDER — SODIUM CHLORIDE 0.9 % (FLUSH) 0.9 %
5-10 SYRINGE (ML) INJECTION AS NEEDED
Status: CANCELLED | OUTPATIENT
Start: 2018-07-05

## 2018-07-05 RX ORDER — SODIUM CHLORIDE, SODIUM LACTATE, POTASSIUM CHLORIDE, CALCIUM CHLORIDE 600; 310; 30; 20 MG/100ML; MG/100ML; MG/100ML; MG/100ML
125 INJECTION, SOLUTION INTRAVENOUS CONTINUOUS
Status: CANCELLED | OUTPATIENT
Start: 2018-07-05

## 2018-07-05 RX ORDER — PROPOFOL 10 MG/ML
INJECTION, EMULSION INTRAVENOUS AS NEEDED
Status: DISCONTINUED | OUTPATIENT
Start: 2018-07-05 | End: 2018-07-05 | Stop reason: HOSPADM

## 2018-07-05 RX ORDER — SODIUM CHLORIDE, SODIUM LACTATE, POTASSIUM CHLORIDE, CALCIUM CHLORIDE 600; 310; 30; 20 MG/100ML; MG/100ML; MG/100ML; MG/100ML
125 INJECTION, SOLUTION INTRAVENOUS CONTINUOUS
Status: DISCONTINUED | OUTPATIENT
Start: 2018-07-05 | End: 2018-07-05 | Stop reason: HOSPADM

## 2018-07-05 RX ADMIN — PROPOFOL 100 MG: 10 INJECTION, EMULSION INTRAVENOUS at 15:15

## 2018-07-05 RX ADMIN — PROPOFOL 50 MG: 10 INJECTION, EMULSION INTRAVENOUS at 15:17

## 2018-07-05 RX ADMIN — LIDOCAINE HYDROCHLORIDE 60 MG: 20 INJECTION, SOLUTION EPIDURAL; INFILTRATION; INTRACAUDAL; PERINEURAL at 15:15

## 2018-07-05 RX ADMIN — PROPOFOL 50 MG: 10 INJECTION, EMULSION INTRAVENOUS at 15:19

## 2018-07-05 RX ADMIN — SODIUM CHLORIDE, SODIUM LACTATE, POTASSIUM CHLORIDE, AND CALCIUM CHLORIDE 125 ML/HR: 600; 310; 30; 20 INJECTION, SOLUTION INTRAVENOUS at 13:14

## 2018-07-05 RX ADMIN — PROPOFOL 50 MG: 10 INJECTION, EMULSION INTRAVENOUS at 15:20

## 2018-07-05 NOTE — H&P
EGD - History and Physical    Subjective: The patient is a 43 y.o. 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       Patient Active Problem List    Diagnosis Date Noted    Hypokalemia 2018    Hypomagnesemia 2018    Nausea & vomiting 2018    Dysphagia 2018    Intestinal malabsorption 2018    S/P gastric bypass 2018    Morbid obesity with BMI of 45.0-49.9, adult ([facility] 2018    Morbid obesity ([facility]     Morbid obesity with body mass index (BMI) of 40.0 to 49.9 ([facility])     Constipation     Functional dyspepsia     History of colon polyps       Past Surgical History:   Procedure Laterality Date    COLONOSCOPY      X 3    DELIVERY       X 3    HX DILATION AND CURETTAGE      HX OTHER SURGICAL      scar resection back of head    HX TUBAL LIGATION      HX UROLOGICAL  2001    stent    IR URETERAL STENT PLACEMENT        Social History   Substance Use Topics    Smoking status: Never Smoker    Smokeless tobacco: Never Used    Alcohol use Yes      Comment: rare      Family History   Problem Relation Age of Onset    Hypertension Mother     Obesity Mother     Hypertension Father         Allergies   Allergen Reactions    Niacin Other (comments)    Morphine Hives and Itching          Review of Systems:        General - No history or complaints of unexpected fever, chills, or weight loss  Head/Neck - No history or complaints of headache, diplopia, dysphagia, hearing loss  Cardiac - No history or complaints of chest pain, palpitations, murmur, or shortness of breath  Pulmonary - No history or complaints of shortness of breath, productive cough, hemoptysis  Gastrointestinal - No history or complaints of reflux,  abdominal pain, obstipation/constipation, blood per rectum  Genitourinary - No history or complaints of hematuria/dysuria, stress urinary incontinence symptoms, or renal lithiasis  Musculoskeletal - No history or complaints of joint pain or muscular weakness  Hematologic - No history or complaints of bleeding disorders, blood transfusions, sickle cell anemia  Neurologic - No history or complaints of  migraine headaches, seizure activity, syncopal episodes, TIA or stroke  Integumentary - No history or complaints of rashes, abnormal nevi, skin cancer  Gynecological - unremarkable    Objective: There were no vitals taken for this visit.       Physical Examination: General appearance - alert, well appearing, and in no distress and oriented to person, place, and time  Mental status - alert, oriented to person, place, and time, normal mood, behavior, speech, dress, motor activity, and thought processes  Eyes - pupils equal and reactive, extraocular eye movements intact, sclera anicteric, left eye normal, right eye normal  Ears - right ear normal, left ear normal  Nose - normal and patent, no erythema, discharge or polyps  Mouth - mucous membranes moist, pharynx normal without lesions  Neck - supple, no significant adenopathy  Lymphatics - no palpable lymphadenopathy, no hepatosplenomegaly  Chest - clear to auscultation, no wheezes, rales or rhonchi, symmetric air entry  Heart - normal rate, regular rhythm, normal S1, S2, no murmurs, rubs, clicks or gallops  Abdomen - soft, nontender, nondistended, no masses or organomegaly  Back exam - full range of motion, no tenderness, palpable spasm or pain on motion  Neurological - alert, oriented, normal speech, no focal findings or movement disorder noted  Musculoskeletal - no joint tenderness, deformity or swelling  Extremities - peripheral pulses normal, no pedal edema, no clubbing or cyanosis  Skin - normal coloration and turgor, no rashes, no suspicious skin lesions noted    Labs / Preoperative Evaluations:     Recent Results (from the past 1008 hour(s))   VITAMIN B1, WHOLE BLOOD    Collection Time: 06/06/18  3:49 PM Result Value Ref Range    Vitamin B1 70.5 66.5 - 740.1 nmol/L   METABOLIC PANEL, COMPREHENSIVE    Collection Time: 06/06/18  3:49 PM   Result Value Ref Range    Sodium 142 136 - 145 mmol/L    Potassium 4.5 3.5 - 5.5 mmol/L    Chloride 104 100 - 108 mmol/L    CO2 24 21 - 32 mmol/L    Anion gap 14 3.0 - 18 mmol/L    Glucose 75 74 - 99 mg/dL    BUN 11 7.0 - 18 MG/DL    Creatinine 0.68 0.6 - 1.3 MG/DL    BUN/Creatinine ratio 16 12 - 20      GFR est AA >60 >60 ml/min/1.73m2    GFR est non-AA >60 >60 ml/min/1.73m2    Calcium 9.1 8.5 - 10.1 MG/DL    Bilirubin, total 0.6 0.2 - 1.0 MG/DL    ALT (SGPT) 28 13 - 56 U/L    AST (SGOT) 23 15 - 37 U/L    Alk. phosphatase 66 45 - 117 U/L    Protein, total 8.2 6.4 - 8.2 g/dL    Albumin 4.1 3.4 - 5.0 g/dL    Globulin 4.1 (H) 2.0 - 4.0 g/dL    A-G Ratio 1.0 0.8 - 1.7     MAGNESIUM    Collection Time: 06/06/18  3:49 PM   Result Value Ref Range    Magnesium 1.8 1.6 - 2.6 mg/dL   HCG QL SERUM    Collection Time: 06/12/18  1:10 PM   Result Value Ref Range    HCG, Ql. NEGATIVE  NEG         Assessment:     2.75 months post gastric bypass with continued dysphagia despite a dilation of her anastomotic stricture last month. She will need a second EGD and likely dilation given her symptoms. Plan:     EGD    Plan for EGD. She understands the risks, benefits and alternatives of the EGD. She understands the risk include but are not limited to; death, DVT/PE, damage to surrounding structures, perforation of the GI tract, equipment malfunction, bleeding, and infection. She understands all of the above and wishes to proceed with EGD.         Signed By: CHIKIS Chaudhari     July 5, 2018

## 2018-07-05 NOTE — PROCEDURES
Esophagogastroduodenoscopy Procedure Note    Indications:  2.75 months post gastric bypass with continued poor PO progression despite a dilation of her 9mm stricture on June 12th. Anesthesia/Sedation: MAC    Pre-Procedure Exam:  Airway: clear   Heart: normal S1and S2    Lungs: clear bilateral  Abdomen: soft, nontender, bowel sounds present and normal in all quadrants   Mental Status: awake, alert, and oriented to person, place, and time      Procedure in Detail:  Informed consent was obtained for the procedure, including conscious sedation. Risks of pancreatitis, infection, perforation, hemorrhage, adverse drug reaction, and aspiration were discussed. The patient was placed in the left lateral decubitus position. Based on the pre-procedure assessment, including review of the patient's medical history, medications, allergies, and review of systems, he had been deemed to be an appropriate candidate for moderate sedation; he was therefore sedated with the medications listed above. He was monitored continuously with electrocardiogram tracing, pulse oximetry, blood pressure monitoring, and direct observation. The [id number] gastroscope was inserted into the mouth and advanced under direct vision to proximal adelfo limb. A careful inspection was made as the gastroscope was withdrawn, including a retroflexed view of the proximal stomach; findings and interventions are described below. Appropriate photodocumentation was obtained.     Findings:   Oropharynx - normal mucosa without abnormalities  Esophagus - normal mucosa without webs, rings, or strictures, no esophagitis or ulcers  Gastric pouch - normal size and configuration with normal mucosa without evidence of ulcers   Anastomosis - 14mm stricture (dilated to 18mm)without ulcers or other anatomical abnormalities  Adelfo limb - normal mucosa without ulcers or obstruction    Balloon dilation to 18mm    Therapies:    Balloon dilation    Specimens: No specimens were collected. Complications:   None; patient tolerated the procedure well. EBL:  None           Attending Attestation:  I performed the procedure. Recommendations:  - Follow symptoms. Signed by:  Holly Yung MD

## 2018-07-05 NOTE — IP AVS SNAPSHOT
[address] [address] 
[phone] Patient: Anita Davis MRN: [id number] :1980 About your hospitalization You were admitted on:  2018 You last received care in the:  THE [facility] ENDOSCOPY You were discharged on:  2018 Why you were hospitalized Your primary diagnosis was:  Not on File Follow-up Information Follow up With Details Comments Contact Info Milton Black MD   Patient can only remember the practice name and not the physician Phyllis Dunaway MD   [facility] [address] [facility] 
[phone] Your Scheduled Appointments   7:30 AM EDT Office Visit with [facility] (Jacinto Aguayo)  
 [facility] [address] [facility]  
[phone] Discharge Orders None A check lillie indicates which time of day the medication should be taken. My Medications CONTINUE taking these medications Instructions Each Dose to Equal  
 Morning Noon Evening Bedtime ADULT MULTIVITAMIN GUMMIES PO Your last dose was: Your next dose is: Take  by mouth. Biotin 2,500 mcg Cap Your last dose was: Your next dose is: Take 2,500 mcg by mouth two (2) times a day. 2500 mcg  
    
   
   
   
  
 calcium citrate 200 mg (950 mg) tablet Your last dose was: Your next dose is: Take 200 mg by mouth two (2) times a day. 200 mg  
    
   
   
   
  
 polyethylene glycol 17 gram/dose powder Commonly known as:  Rosanne Allen Your last dose was: Your next dose is: Take 17 g by mouth daily. 17 g  
    
   
   
   
  
 VITAMIN B12 PO Your last dose was: Your next dose is: Take  by mouth two (2) times a day. VITAMIN D3 PO Your last dose was: Your next dose is: Take 1 Tab by mouth daily. 1 Tab ZOFRAN 4 mg tablet Generic drug:  ondansetron hcl Your last dose was: Your next dose is: Take 4 mg by mouth every eight (8) hours as needed for Nausea. 4 mg Discharge Instructions Sushmajaswinder Zack [id number] 
1980 EGD DISCHARGE INSTRUCTIONS Discomfort: 
Sore throat- throat lozenges or warm salt water gargle 
redness at IV site- apply warm compress to area; if redness or soreness persist- contact your physician Gaseous discomfort- walking, belching will help relieve any discomfort You should not operate a vehicle for 12 hours You should note engage in an occupation involving machinery or appliances for rest of today You may note drink alcoholic beverages for at least 12 hours Avoid making any critical decisions for at least 24 hour DIET You may resume your regular diet  however -  remember your colon is empty and a heavy meal will produce gas. Avoid these foods:  vegetables, fried / greasy foods, carbonated drinks ACTIVITY You may resume your normal daily activities Spend the remainder of the day resting -  avoid any strenuous activity. CALL M.D. ANY SIGN OF Increasing pain, nausea, vomiting Abdominal distension (swelling) New increased bleeding (oral or rectal) Fever (chills) Pain in chest area Bloody discharge from nose or mouth Shortness of breath Follow-up Instructions: 
 Dr En Ibanez will call you in one to two weeks. If you do not hear from him, please call his office [phone]. Ana Zack [phone] 
1980 DISCHARGE SUMMARY from Nurse The following personal items collected during your admission are returned to you:  
Dental Appliance: Dental Appliances: None Vision:   
Hearing Aid:   
Jewelry: Jewelry: None Clothing: Clothing: Undergarments, Footwear, Socks, Shirt, Pants (to be given to ChristenWestelias) Other Valuables: Other Valuables: Eyeglasses, Cell Phone, [place] [address], [place] (to be given to Troy) Valuables sent to safe:   
 
PATIENT INSTRUCTIONS: 
 
Take Home Medications: 
 
 
 
 
 
  
DISCHARGE SUMMARY from Nurse PATIENT INSTRUCTIONS: 
 
 
F-face looks uneven A-arms unable to move or move unevenly S-speech slurred or non-existent T-time-call 911 as soon as signs and symptoms begin-DO NOT go Back to bed or wait to see if you get better-TIME IS BRAIN. Warning Signs of HEART ATTACK Call 911 if you have these symptoms: 
? Chest discomfort. Most heart attacks involve discomfort in the center of the chest that lasts more than a few minutes, or that goes away and comes back. It can feel like uncomfortable pressure, squeezing, fullness, or pain. ? Discomfort in other areas of the upper body. Symptoms can include pain or discomfort in one or both arms, the back, neck, jaw, or stomach. ? Shortness of breath with or without chest discomfort. ? Other signs may include breaking out in a cold sweat, nausea, or lightheadedness. Don't wait more than five minutes to call [address]! Fast action can save your life. Calling 911 is almost always the fastest way to get lifesaving treatment. Emergency Medical Services staff can begin treatment when they arrive  up to an hour sooner than if someone gets to the hospital by car. Patient armband removed and shredded The discharge information has been reviewed with the patient and caregiver. The patient and caregiver verbalized understanding. Discharge medications reviewed with the patient and caregiver and appropriate educational materials and side effects teaching were provided. ___________________________________________________________________________________________________________________________________ Introducing [facility]! Supa Florez introduces Thumb patient portal. Now you can access parts of your medical record, email your doctor's office, and request medication refills online. 1. In your internet browser, go to [email or web address] 2. Click on the First Time User? Click Here link in the Sign In box. You will see the New Member Sign Up page. 3. Enter your Thumb Access Code exactly as it appears below. You will not need to use this code after youve completed the sign-up process. If you do not sign up before the expiration date, you must request a new code. · Thumb Access Code: [email or web address] Expires: 9/4/2018  2:09 PM 
 
4. Enter the last four digits of your Social Security Number (xxxx) and Date of Birth (mm/dd/yyyy) as indicated and click Submit. You will be taken to the next sign-up page. 5. Create a And[employer] ID. This will be your Thumb login ID and cannot be changed, so think of one that is secure and easy to remember. 6. Create a Thumb password. You can change your password at any time. 7. Enter your Password Reset Question and Answer. This can be used at a later time if you forget your password. 8. Enter your e-mail address. You will receive e-mail notification when new information is available in [address] 9. Click Sign Up. You can now view and download portions of your medical record. 10. Click the Download Summary menu link to download a portable copy of your medical information. If you have questions, please visit the Frequently Asked Questions section of the Thumb website. Remember, Thumb is NOT to be used for urgent needs. For medical emergencies, dial 911. Now available from your iPhone and Android! Introducing Rogerio Arroyo As a [facility] patient, I wanted to make you aware of our electronic visit tool called Rogerio Arroyo. [facility] 24/7 allows you to connect within minutes with a medical provider 24 hours a day, seven days a week via a mobile device or tablet or logging into a secure website from your computer. You can access Rogerio Arroyo from anywhere in the [place]. A virtual visit might be right for you when you have a simple condition and feel like you just dont want to get out of bed, or cant get away from work for an appointment, when your regular [facility] provider is not available (evenings, weekends or holidays), or when youre out of town and need minor care. Electronic visits cost only $49 and if the [facility] 24/7 provider determines a prescription is needed to treat your condition, one can be electronically transmitted to a nearby pharmacy*. Please take a moment to enroll today if you have not already done so. The enrollment process is free and takes just a few minutes. To enroll, please download the [facility] [employer] ayden to your tablet or phone, or visit [email or web address] to enroll on your computer. And, as an [address] [place] [address] patient with a [email or web address] account, the results of your visits will be scanned into your electronic medical record and your primary care provider will be able to view the scanned results. We urge you to continue to see your regular [facility] provider for your ongoing medical care. And while your primary care provider may not be the one available when you seek a Rogerio Arroyo virtual visit, the peace of mind you get from getting a real diagnosis real time can be priceless. For more information on Rogerio Arroyo, view our Frequently Asked Questions (FAQs) at [email or web address]. Sincerely, 
 
Jatinder Merlos MD 
Chief Medical Officer [address] *:  certain medications cannot be prescribed via Rogerio Arroyo Providers Seen During Your Hospitalization Provider Specialty Primary office phone Patric Marinelli MD General Surgery [phone] Your Primary Care Physician (PCP) Primary Care Physician Office Phone Office Fax OTHER, PHYS ** None ** ** None ** You are allergic to the following Allergen Reactions Niacin Other (comments) Morphine Hives Itching Recent Documentation Height Weight BMI OB Status Smoking Status 1.473 m 86.3 kg 39.75 kg/m2 Having regular periods Never Smoker Emergency Contacts Name Discharge Info Relation Home Work Mobile Troy King DISCHARGE CAREGIVER [3] Spouse [3]   [phone] Patient Belongings The following personal items are in your possession at time of discharge: 
  Dental Appliances: None         Home Medications: None   Jewelry: None  Clothing: Undergarments, Footwear, Socks, Shirt, Pants (to be given to Michelle Overton)    Other Valuables: Eyeglasses, Cell Phone, Vimal Jordan, [address] (to be given to Troy) Please provide this summary of care documentation to your next provider. Signatures-by signing, you are acknowledging that this After Visit Summary has been reviewed with you and you have received a copy. Patient Signature:  ____________________________________________________________ Date:  ____________________________________________________________  
  
Humberto Andrews Provider Signature:  ____________________________________________________________ Date:  ____________________________________________________________

## 2018-07-05 NOTE — ANESTHESIA PREPROCEDURE EVALUATION
Anesthetic History     PONV          Review of Systems / Medical History  Patient summary reviewed, nursing notes reviewed and pertinent labs reviewed    Pulmonary  Within defined limits                 Neuro/Psych   Within defined limits           Cardiovascular  Within defined limits                Exercise tolerance: >4 METS     GI/Hepatic/Renal     GERD           Endo/Other        Morbid obesity     Other Findings              Physical Exam    Airway  Mallampati: III  TM Distance: 4 - 6 cm  Neck ROM: normal range of motion   Mouth opening: Normal     Cardiovascular  Regular rate and rhythm,  S1 and S2 normal,  no murmur, click, rub, or gallop  Rhythm: regular  Rate: normal         Dental  No notable dental hx       Pulmonary  Breath sounds clear to auscultation               Abdominal  GI exam deferred       Other Findings            Anesthetic Plan    ASA: 3  Anesthesia type: MAC            Anesthetic plan and risks discussed with: Patient and Spouse

## 2018-07-05 NOTE — DISCHARGE INSTRUCTIONS
Ermias Rivera  [id number]  1980    EGD DISCHARGE INSTRUCTIONS    Discomfort:  Sore throat- throat lozenges or warm salt water gargle  redness at IV site- apply warm compress to area; if redness or soreness persist- contact your physician  Gaseous discomfort- walking, belching will help relieve any discomfort  You should not operate a vehicle for 12 hours  You should note engage in an occupation involving machinery or appliances for rest of today  You may note drink alcoholic beverages for at least 12 hours  Avoid making any critical decisions for at least 24 hour  DIET  You may resume your regular diet - however -  remember your colon is empty and a heavy meal will produce gas. Avoid these foods:  vegetables, fried / greasy foods, carbonated drinks    ACTIVITY  You may resume your normal daily activities   Spend the remainder of the day resting -  avoid any strenuous activity. CALL M.D. ANY SIGN OF   Increasing pain, nausea, vomiting  Abdominal distension (swelling)  New increased bleeding (oral or rectal)  Fever (chills)  Pain in chest area  Bloody discharge from nose or mouth  Shortness of breath       Follow-up Instructions:   Dr Parth Tao will call you in one to two weeks. If you do not hear from him, please call his office [phone]. Ermias Rivera  [id number]  1980        DISCHARGE SUMMARY from Nurse    The following personal items collected during your admission are returned to you:   Dental Appliance: Dental Appliances: None  Vision:    Hearing Aid:    Jewelry: Jewelry: None  Clothing: Clothing: Undergarments, Footwear, Socks, Shirt, Pants (to be given to Simeon)  Other Valuables:  Other Valuables: Eyeglasses, Cell Phone, [place] 1924, [address] (to be given to [place])  Valuables sent to safe:      PATIENT INSTRUCTIONS:    Take Home Medications:               DISCHARGE SUMMARY from Nurse    PATIENT INSTRUCTIONS:    After general anesthesia or intravenous sedation, for 24 hours or while taking prescription Narcotics:  · Limit your activities  · Do not drive and operate hazardous machinery  · Do not make important personal or business decisions  · Do  not drink alcoholic beverages  · If you have not urinated within 8 hours after discharge, please contact your surgeon on call. Report the following to your surgeon:  · Excessive pain, swelling, redness or odor of or around the surgical area  · Temperature over 100.5  · Nausea and vomiting lasting longer than 4 hours or if unable to take medications  · Any signs of decreased circulation or nerve impairment to extremity: change in color, persistent  numbness, tingling, coldness or increase pain  · Any questions    What to do at Home:  Recommended activity: Ambulate in house,     If you experience any of the following symptoms above, please follow up with Dr. Markos Sosa. *  Please give a list of your current medications to your Primary Care Provider. *  Please update this list whenever your medications are discontinued, doses are      changed, or new medications (including over-the-counter products) are added. *  Please carry medication information at all times in case of emergency situations. These are general instructions for a healthy lifestyle:    No smoking/ No tobacco products/ Avoid exposure to second hand smoke  Surgeon General's Warning:  Quitting smoking now greatly reduces serious risk to your health. Obesity, smoking, and sedentary lifestyle greatly increases your risk for illness    A healthy diet, regular physical exercise & weight monitoring are important for maintaining a healthy lifestyle    You may be retaining fluid if you have a history of heart failure or if you experience any of the following symptoms:  Weight gain of 3 pounds or more overnight or 5 pounds in a week, increased swelling in our hands or feet or shortness of breath while lying flat in bed.   Please call your doctor as soon as you notice any of these symptoms; do not wait until your next office visit. Recognize signs and symptoms of STROKE:    F-face looks uneven    A-arms unable to move or move unevenly    S-speech slurred or non-existent    T-time-call 911 as soon as signs and symptoms begin-DO NOT go       Back to bed or wait to see if you get better-TIME IS BRAIN. Warning Signs of HEART ATTACK     Call 911 if you have these symptoms:   Chest discomfort. Most heart attacks involve discomfort in the center of the chest that lasts more than a few minutes, or that goes away and comes back. It can feel like uncomfortable pressure, squeezing, fullness, or pain.  Discomfort in other areas of the upper body. Symptoms can include pain or discomfort in one or both arms, the back, neck, jaw, or stomach.  Shortness of breath with or without chest discomfort.  Other signs may include breaking out in a cold sweat, nausea, or lightheadedness. Don't wait more than five minutes to call 911 - MINUTES MATTER! Fast action can save your life. Calling 911 is almost always the fastest way to get lifesaving treatment. Emergency Medical Services staff can begin treatment when they arrive -- up to an hour sooner than if someone gets to the hospital by car. Patient armband removed and shredded    The discharge information has been reviewed with the patient and caregiver. The patient and caregiver verbalized understanding. Discharge medications reviewed with the patient and caregiver and appropriate educational materials and side effects teaching were provided.   ___________________________________________________________________________________________________________________________________

## 2018-07-05 NOTE — ANESTHESIA POSTPROCEDURE EVALUATION
Post-Anesthesia Evaluation and Assessment    Cardiovascular Function/Vital Signs  Visit Vitals    /70    Pulse 85    Temp 36.3 °C (97.4 °F)    Resp 16    Ht 4' 10\" (1.473 m)    Wt 86.3 kg (190 lb 3 oz)    SpO2 98%    BMI 39.75 kg/m2       Patient is status post Procedure(s):  EGD WITH MAC ANESTHESIA, BALLOON DILATATION   . Nausea/Vomiting: Controlled. Postoperative hydration reviewed and adequate. Pain:  Pain Scale 1: Numeric (0 - 10) (07/05/18 1615)  Pain Intensity 1: 0 (07/05/18 1615)   Managed. Neurological Status:   Neuro (WDL): Within Defined Limits (07/05/18 1615)   At baseline. Mental Status and Level of Consciousness: Arousable. Pulmonary Status:   O2 Device: Room air (07/05/18 1615)   Adequate oxygenation and airway patent. Complications related to anesthesia: None    Post-anesthesia assessment completed. No concerns. Patient has met all discharge requirements.     Signed By: Jeffrey Garcia MD    July 5, 2018

## 2018-07-05 NOTE — PERIOP NOTES
Discharge instructions completed - opportunity for questions - tolerated ice chips and water denies difficulty swallowing

## 2018-07-05 NOTE — INTERVAL H&P NOTE
H&P Update:  Cici Wise was seen and examined. History and physical has been reviewed. The patient has been examined.  There have been no significant clinical changes since the completion of the originally dated History and Physical.    Signed By: Fabby Kerr MD     July 5, 2018 3:02 PM

## 2018-07-10 ENCOUNTER — TELEPHONE (OUTPATIENT)
Dept: SURGERY | Age: 38
End: 2018-07-10

## 2018-07-10 DIAGNOSIS — K21.9 GASTROESOPHAGEAL REFLUX DISEASE WITHOUT ESOPHAGITIS: Primary | ICD-10-CM

## 2018-07-10 RX ORDER — SUCRALFATE 1 G/10ML
1 SUSPENSION ORAL 4 TIMES DAILY
Qty: 414 ML | Refills: 2 | Status: SHIPPED | OUTPATIENT
Start: 2018-07-10 | End: 2018-10-16

## 2018-08-02 ENCOUNTER — OFFICE VISIT (OUTPATIENT)
Dept: SURGERY | Age: 38
End: 2018-08-02

## 2018-08-02 VITALS
DIASTOLIC BLOOD PRESSURE: 71 MMHG | RESPIRATION RATE: 16 BRPM | HEIGHT: 58 IN | HEART RATE: 78 BPM | SYSTOLIC BLOOD PRESSURE: 120 MMHG | BODY MASS INDEX: 37.55 KG/M2 | OXYGEN SATURATION: 100 % | WEIGHT: 178.9 LBS

## 2018-08-02 DIAGNOSIS — K90.9 INTESTINAL MALABSORPTION, UNSPECIFIED TYPE: Primary | ICD-10-CM

## 2018-08-02 DIAGNOSIS — Z98.84 S/P GASTRIC BYPASS: ICD-10-CM

## 2018-08-02 PROBLEM — E66.01 MORBID OBESITY WITH BMI OF 45.0-49.9, ADULT (HCC): Status: RESOLVED | Noted: 2018-04-05 | Resolved: 2018-08-02

## 2018-08-02 NOTE — PATIENT INSTRUCTIONS
Patient Instructions      1. Remember hydration goals - minimum of 64 ounces of liquids per day (dehydration is the number one reason for hospital readmission). 2. Continue to monitor carbohydrate and protein intake you need a minimum of  Grams of protein daily- remember to keep your total carbohydrates to 50 grams or less per day for best results. 3. Continue to work towards exercise goals - 60-90 minutes, 5 times a week minimum of deliberate, aerobic exercise is the ultimate goal with strength training 2 times each week. Refer to [employer] for  information. 4. Remember to take vitamins as directed in your handbook. 5. Attend support group the 2nd Thursday of each month. 6. Constipation: Milk of Magnesia is for immediate relief. Miralax is to be used every day if constipation is a chronic problem. 7. Diarrhea: patients will occasionally develop lactose intolerance after surgery. Check to see if your protein shake has whey in it. If it does try one that does not have whey and stop all yogurts, cheeses and milks to see if the diarrhea goes away. 8. Call us at [phone] or email us through [facility] with questions,     concerns or worsening of condition, we have someone on call 24 hours a day. If you are unable to reach our office, you are to go to your Primary Care Physician or the Emergency Department. Supplement Resource Guide    Importance of Protein:   Maintains lean body mass, produces antibodies to fight off infections, heals wounds, minimizes hair loss, helps to give you energy, helps with satiety, and keeping you full between meals. Importance of Calcium:  Needed for healthy bones and teeth, normal blood clotting, and nervous system functioning, higher risk of osteoporosis and bone disease with non-compliance. Importance of Multivitamins: Many functions.   Supply you with extra nutrients that you may be missing from food. May lead to iron deficiency anemia, weakness, fatigue, and many other symptoms with non-compliance. Importance of B Vitamins:  Important for red blood cell formation, metabolism, energy, and helps to maintain a healthy nervous system. Protein Supplement  Find one you like now. Use immediately after surgery. Look for:  35-50g protein each day from your protein supplement once you reach the progression diet. 0-3 g fat per serving  0-3 g sugar per serving    Protein drinks should be split in separate dosages. Recommend: Lifelong  1 year + Calcium Supplement:     Start taking within a month after surgery. Look for: Calcium Citrate Plus D (1500 mg per day)  Recommend: Citracal     .            Avoid chocolate chewable calcium. Can use chewable bariatric or [employer] brand or similar chewable. The body cannot absorb more than 500-600 mg of calcium at a time. Take for Life Multi-vitamin Supplement:      Start immediately after surgery: any complete chewable, such as: Pompeys Complete chewables. Avoid Pompey sours or gummies. They lack iron and other important nutrients and also have added sugar. Continue with chewable vitamin or change to adult complete multivitamin one month after surgery. Menstruating women can take a prenatal vitamin. Make sure has at least 18 mg iron and 278-910 mcg folic acid   Vitamin I67, B Complex Vitamin, and Biotin  Start taking within a month after surgery. Vitamin B12:  1000 mcg of Vitamin B12 three times weekly    Must take sublingually (meaning you take it under your tongue) or in a liquid drop form for easy absorption. B Complex Vitamin: Take a pill or liquid drop form once daily. Biotin: This vitamin can help prevent hair loss.     Recommend 5mg   (5000 mcg) a day  Biotin is Optional

## 2018-08-02 NOTE — PROGRESS NOTES
Subjective:      Santi Raymundo is a 43 y.o. female is now 4 months status post laparoscopic gastric bypass surgery. Doing well overall. She has lost a total of 51 pounds since surgery. Body mass index is 37.39 kg/(m^2). Has lost 45% of EBW. Currently on a solid food diet without difficulty, reports no issues and denies vomiting and abdominal pain. Taking in 50oz water daily. Sources of protein include chicken and protein shakes. 20-40 min of activity 7 days a week, including walking. Bowel movements are constipated, is using FiberOne Gummies. The patient is not having any pain. . The patient is compliant with multivitamins, calcium, Vit D and B12 supplements.      Weight Loss Metrics 8/2/2018 7/5/2018 7/3/2018 6/27/2018 6/12/2018 6/8/2018 6/6/2018   Today's Wt 178 lb 14.4 oz 190 lb 3 oz 195 lb 192 lb 8 oz 195 lb 195 lb 12 oz 195 lb 8 oz   BMI 37.39 kg/m2 39.75 kg/m2 40.76 kg/m2 40.23 kg/m2 40.76 kg/m2 40.22 kg/m2 40.16 kg/m2          Comorbidities:    Hypertension: not applicable  Diabetes: not applicable  Obstructive Sleep Apnea: not applicable  Hyperlipidemia: not applicable  Stress Urinary Incontinence: not applicable  Gastroesophageal Reflux: improved  Weight related arthropathy:improved     Patient Active Problem List   Diagnosis Code    Morbid obesity ([facility] [address] E66.01    Constipation K59.00    Functional dyspepsia K30    History of colon polyps Z86.010    Intestinal malabsorption K90.9    S/P gastric bypass Z98.84    Hypokalemia E87.6    Hypomagnesemia E83.42    Nausea & vomiting R11.2    Dysphagia R13.10        Past Medical History:   Diagnosis Date    Constipation     Functional dyspepsia     uses OTC meds    GERD (gastroesophageal reflux disease)     History of colon polyps     Morbid obesity ([facility] [address]     Morbid obesity with body mass index (BMI) of 40.0 to 49.9 ([facility]     Nausea & vomiting 6/6/2018       Past Surgical History:   Procedure Laterality Date    COLONOSCOPY      X 3    DELIVERY       X 3    HX DILATION AND CURETTAGE      HX OTHER SURGICAL      scar resection back of head    HX TUBAL LIGATION      HX UROLOGICAL  2001    stent    IR URETERAL STENT PLACEMENT         Current Outpatient Prescriptions   Medication Sig Dispense Refill    inulin (FIBER GUMMIES PO) Take  by mouth.  sucralfate (CARAFATE) 100 mg/mL suspension Take 10 mL by mouth four (4) times daily. 414 mL 2    ondansetron hcl (ZOFRAN) 4 mg tablet Take 4 mg by mouth every eight (8) hours as needed for Nausea.  multivit-minerals/folic acid (ADULT MULTIVITAMIN GUMMIES PO) Take  by mouth.  calcium citrate 200 mg (950 mg) tablet Take 200 mg by mouth two (2) times a day.  cyanocobalamin, vitamin B-12, (VITAMIN B12 PO) Take  by mouth two (2) times a day.  cholecalciferol, vitamin D3, (VITAMIN D3 PO) Take 1 Tab by mouth daily.  polyethylene glycol (MIRALAX) 17 gram/dose powder Take 17 g by mouth daily. 1700 g 3    Biotin 2,500 mcg cap Take 2,500 mcg by mouth two (2) times a day.          Allergies   Allergen Reactions    Niacin Other (comments)    Morphine Hives and Itching       Review of Systems:  General - No history or complaints of unexpected fever or chills  Head/Neck - No history or complaints of headache or dizziness  Cardiac - No history or complaints of chest pain, palpitations, or shortness of breath  Pulmonary - No history or complaints of shortness of breath or productive cough  Gastrointestinal - as noted above  Genitourinary - No history or complaints of hematuria/dysuria or renal lithiasis  Musculoskeletal - No history or complaints of joint  muscular weakness  Hematologic - No history of any bleeding episodes  Neurologic - No history or complaints of  migraine headaches or neurologic symptoms    Objective:     Visit Vitals    /71 (BP 1 Location: Left arm, BP Patient Position: Sitting)    Pulse 78    Resp 16    Ht 4' 10\" (1.473 m)    Wt 81.1 kg (178 lb 14.4 oz)    SpO2 100%    BMI 37.39 kg/m2       General:  alert, cooperative, no distress, appears stated age   Chest: lungs clear to auscultation, breath sounds equal and symmetric, no rhonchi, rales or wheezes, no accessory muscle use   Cor:   Regular rate and rhythm or without murmur or extra heart sounds   Abdomen: soft, bowel sounds active, non-tender, no masses or organomegaly   Incisions:   healing well, no drainage, no erythema, no hernia, no seroma, no swelling, no dehiscence, incision well approximated       Assessment:   History of Morbid obesity, status post laparoscopic gastric bypass surgery. Doing well postoperatively. GERD - continue PPI  Increase fluid intake to 64oz or more daily  Plan:     1. Increase fluids  2. Discussed patients weight loss goals and dietary choices in relation to goals. 3. Reminded to measure portions, continue high protein, low carbohydrate diet. Reminded to eat regularly, to eat slowly & not to drink with meals. 4. Continue vitamin supplementation  5. Continue current medications and follow up with PCP for management of regimen. 6. Continue cardio exercise and add resistance exercises. 60-90 minutes of aerobic activity 5 days a week and strength training 2 days each week. 7. Encouraged to attend support group   8. Patient to complete labs before next visit. Lab slip given today. 9. I have discussed this plan with patient and they verbalized understanding  10. Follow up in 2 months or sooner if patient has questions, concerns or worsening of condition, if unable to reach our office, patient should report to the ED. 6. Ms. Rafael Christine has a reminder for a \"due or due soon\" health maintenance. I have asked that she contact her primary care provider for a follow-up on this health maintenance.

## 2018-08-02 NOTE — MR AVS SNAPSHOT
[address] [place] [address] 
[phone] Patient: Marjorie Doshi MRN: [id number] :1980 Visit Information Date & Time Provider Department Dept. Phone Encounter #  
 2018  7:30 AM Asha Hercules, 82 [facility] [phone] Follow-up Instructions Return in about 2 months (around 10/2/2018). Your Appointments 10/4/2018 11:00 AM  
Office Visit with CHIKIS Qureshi [employer] [facility] ([address] [facility]) Appt Note: F/U  
 [id number] [facility] [address] Upcoming Health Maintenance Date Due DTaP/Tdap/Td series (1 - Tdap) 2000 PAP AKA CERVICAL CYTOLOGY 2000 Influenza Age 5 to Adult 2018 Allergies as of 2018  Review Complete On: 2018 By: Nadeem Faustin LPN Severity Noted Reaction Type Reactions Niacin High 2018   Intolerance Other (comments) Morphine  2017    Hives, Itching Current Immunizations  Reviewed on 2018 No immunizations on file. Not reviewed this visit You Were Diagnosed With   
  
 Codes Comments Intestinal malabsorption, unspecified type    -  Primary ICD-10-CM: K90.9 ICD-9-CM: 579.9 S/P gastric bypass     ICD-10-CM: E24.72 ICD-9-CM: V45.86 Vitals BP Pulse Resp Height(growth percentile) Weight(growth percentile) SpO2  
 120/71 (BP 1 Location: Left arm, BP Patient Position: Sitting) 78 16 4' 10\" (1.473 m) 178 lb 14.4 oz (81.1 kg) 100% BMI OB Status Smoking Status 37.39 kg/m2 Having regular periods Never Smoker Vitals History BMI and BSA Data Body Mass Index Body Surface Area  
 37.39 kg/m 2 1.82 m 2 Preferred Pharmacy Pharmacy Name Phone [facility] [address] 6121 Your Updated Medication List  
  
   
This list is accurate as of 8/2/18  8:05 AM.  Always use your most recent med list.  
  
  
  
  
 ADULT MULTIVITAMIN GUMMIES PO Take  by mouth. Biotin 2,500 mcg Cap Take 2,500 mcg by mouth two (2) times a day. calcium citrate 200 mg (950 mg) tablet Take 200 mg by mouth two (2) times a day. FIBER GUMMIES PO Take  by mouth.  
  
 polyethylene glycol 17 gram/dose powder Commonly known as:  Sally Kehr Take 17 g by mouth daily. sucralfate 100 mg/mL suspension Commonly known as:  Ressie Flavin Take 10 mL by mouth four (4) times daily. VITAMIN B12 PO Take  by mouth two (2) times a day. VITAMIN D3 PO Take 1 Tab by mouth daily. ZOFRAN 4 mg tablet Generic drug:  ondansetron hcl Take 4 mg by mouth every eight (8) hours as needed for Nausea. Follow-up Instructions Return in about 2 months (around 10/2/2018). To-Do List   
 08/02/2018 Lab:  CBC WITH AUTOMATED DIFF   
  
 08/02/2018 Lab:  FERRITIN   
  
 08/02/2018 Lab:  IRON   
  
 08/02/2018 Lab:  METABOLIC PANEL, COMPREHENSIVE   
  
 08/02/2018 Lab:  VITAMIN B1, WHOLE BLOOD   
  
 08/02/2018 Lab:  VITAMIN B12 & FOLATE   
  
 08/02/2018 Lab:  VITAMIN D, 25 HYDROXY Patient Instructions Patient Instructions 1. Remember hydration goals - minimum of 64 ounces of liquids per day (dehydration is the number one reason for hospital readmission). 2. Continue to monitor carbohydrate and protein intake you need a minimum of  Grams of protein daily- remember to keep your total carbohydrates to 50 grams or less per day for best results.  
3. Continue to work towards exercise goals - 60-90 minutes, 5 times a week minimum of deliberate, aerobic exercise is the ultimate goal with strength training 2 times each week. Refer to [employer] for  information. 4. Remember to take vitamins as directed in your handbook. 5. Attend support group the 2nd Thursday of each month. 6. Constipation: Milk of Magnesia is for immediate relief. Miralax is to be used every day if constipation is a chronic problem. 7. Diarrhea: patients will occasionally develop lactose intolerance after surgery. Check to see if your protein shake has whey in it. If it does try one that does not have whey and stop all yogurts, cheeses and milks to see if the diarrhea goes away. 8. Call us at [phone] or email us through [facility]" with questions,     concerns or worsening of condition, we have someone on call 24 hours a day. If you are unable to reach our office, you are to go to your Primary Care Physician or the Emergency Department. Supplement Resource Guide Importance of Protein:  
Maintains lean body mass, produces antibodies to fight off infections, heals wounds, minimizes hair loss, helps to give you energy, helps with satiety, and keeping you full between meals. Importance of Calcium: 
Needed for healthy bones and teeth, normal blood clotting, and nervous system functioning, higher risk of osteoporosis and bone disease with non-compliance. Importance of Multivitamins: Many functions. Supply you with extra nutrients that you may be missing from food. May lead to iron deficiency anemia, weakness, fatigue, and many other symptoms with non-compliance. Importance of B Vitamins: 
Important for red blood cell formation, metabolism, energy, and helps to maintain a healthy nervous system. Protein Supplement Find one you like now. Use immediately after surgery. Look for: 
35-50g protein each day from your protein supplement once you reach the progression diet. 0-3 g fat per serving 0-3 g sugar per serving Protein drinks should be split in separate dosages. Recommend: Lifelong 1 year + Calcium Supplement:  
 
Start taking within a month after surgery. Look for: Calcium Citrate Plus D (1500 mg per day) Recommend: Citracal 
 
 . Avoid chocolate chewable calcium. Can use chewable bariatric or GNC brand or similar chewable. The body cannot absorb more than 500-600 mg of calcium at a time. Take for Life Multi-vitamin Supplement:   
 
Start immediately after surgery: any complete chewable, such as: [place]s Complete chewables. Avoid [place] sours or gummies. They lack iron and other important nutrients and also have added sugar. Continue with chewable vitamin or change to adult complete multivitamin one month after surgery. Menstruating women can take a prenatal vitamin. Make sure has at least 18 mg iron and 503-675 mcg folic acid Vitamin B12, B Complex Vitamin, and Biotin Start taking within a month after surgery. Vitamin B12:  1000 mcg of Vitamin B12 three times weekly Must take sublingually (meaning you take it under your tongue) or in a liquid drop form for easy absorption. B Complex Vitamin: Take a pill or liquid drop form once daily. Biotin: This vitamin can help prevent hair loss. Recommend 5mg  
(5000 mcg) a day Biotin is Optional  
 
 
 
 
 
 
  
Introducing [facility]! Alexey Gonsales introduces [employer] patient portal. Now you can access parts of your medical record, email your doctor's office, and request medication refills online. 1. In your internet browser, go to [email or web address] 2. Click on the First Time User? Click Here link in the Sign In box. You will see the New Member Sign Up page. 3. Enter your [employer] Access Code exactly as it appears below. You will not need to use this code after youve completed the sign-up process. If you do not sign up before the expiration date, you must request a new code.  
 
· [employer] Access Code: [email or web address] 
 Expires: 9/4/2018  2:09 PM 
 
4. Enter the last four digits of your Social Security Number (xxxx) and Date of Birth (mm/dd/yyyy) as indicated and click Submit. You will be taken to the next sign-up page. 5. Create a [employer] ID. This will be your [employer] login ID and cannot be changed, so think of one that is secure and easy to remember. 6. Create a [employer] password. You can change your password at any time. 7. Enter your Password Reset Question and Answer. This can be used at a later time if you forget your password. 8. Enter your e-mail address. You will receive e-mail notification when new information is available in [address] 9. Click Sign Up. You can now view and download portions of your medical record. 10. Click the Download Summary menu link to download a portable copy of your medical information. If you have questions, please visit the Frequently Asked Questions section of the [employer] website. Remember, [employer] is NOT to be used for urgent needs. For medical emergencies, dial 911. Now available from your iPhone and Android! Please provide this summary of care documentation to your next provider. Your primary care clinician is listed as Phys Other. If you have any questions after today's visit, please call [phone].

## 2018-08-10 ENCOUNTER — DOCUMENTATION ONLY (OUTPATIENT)
Dept: SURGERY | Age: 38
End: 2018-08-10

## 2018-08-10 ENCOUNTER — TELEPHONE (OUTPATIENT)
Dept: SURGERY | Age: 38
End: 2018-08-10

## 2018-08-10 NOTE — TELEPHONE ENCOUNTER
I returned patients call about her labs and it went to voicemail. I left a message stating that we do not have her lab results, [place] she will need to have them sent to us. I left our fax number on her voice mail.

## 2018-08-14 ENCOUNTER — TELEPHONE (OUTPATIENT)
Dept: SURGERY | Age: 38
End: 2018-08-14

## 2018-08-14 NOTE — TELEPHONE ENCOUNTER
I returned patient's phone call about her labs. We do not have the results. [place] I have explained to her in the past and again when I gave her this last lab slip, that she has to have the results sent to us. I left her a voicemail with our fax number so she can call which ever [place] she went to and have the results forwarded to our office.

## 2018-09-05 ENCOUNTER — HOSPITAL ENCOUNTER (OUTPATIENT)
Dept: LAB | Age: 38
Discharge: HOME | End: 2018-09-05
Payer: OTHER GOVERNMENT

## 2018-09-05 ENCOUNTER — TELEPHONE (OUTPATIENT)
Dept: SURGERY | Age: 38
End: 2018-09-05

## 2018-09-05 DIAGNOSIS — E87.6 HYPOKALEMIA: Primary | ICD-10-CM

## 2018-09-05 DIAGNOSIS — K90.9 INTESTINAL MALABSORPTION, UNSPECIFIED TYPE: ICD-10-CM

## 2018-09-05 DIAGNOSIS — K90.9 INTESTINAL MALABSORPTION, UNSPECIFIED TYPE: Primary | ICD-10-CM

## 2018-09-05 DIAGNOSIS — Z98.84 S/P GASTRIC BYPASS: ICD-10-CM

## 2018-09-05 DIAGNOSIS — E83.42 HYPOMAGNESEMIA: ICD-10-CM

## 2018-09-05 LAB
25(OH)D3 SERPL-MCNC: 35.7 NG/ML (ref 30–100)
ALBUMIN SERPL-MCNC: 3.8 G/DL (ref 3.4–5)
ALBUMIN/GLOB SERPL: 1 {RATIO} (ref 0.8–1.7)
ALP SERPL-CCNC: 65 U/L (ref 45–117)
ALT SERPL-CCNC: 27 U/L (ref 13–56)
ANION GAP SERPL CALC-SCNC: 10 MMOL/L (ref 3–18)
AST SERPL-CCNC: 22 U/L (ref 15–37)
BILIRUB SERPL-MCNC: 0.4 MG/DL (ref 0.2–1)
BUN SERPL-MCNC: 14 MG/DL (ref 7–18)
BUN/CREAT SERPL: 22 (ref 12–20)
CALCIUM SERPL-MCNC: 8.9 MG/DL (ref 8.5–10.1)
CHLORIDE SERPL-SCNC: 106 MMOL/L (ref 100–108)
CO2 SERPL-SCNC: 23 MMOL/L (ref 21–32)
CREAT SERPL-MCNC: 0.63 MG/DL (ref 0.6–1.3)
FERRITIN SERPL-MCNC: 23 NG/ML (ref 8–388)
FOLATE SERPL-MCNC: >20 NG/ML (ref 3.1–17.5)
GLOBULIN SER CALC-MCNC: 3.8 G/DL (ref 2–4)
GLUCOSE SERPL-MCNC: 85 MG/DL (ref 74–99)
IRON SERPL-MCNC: 45 UG/DL (ref 50–175)
POTASSIUM SERPL-SCNC: 3.5 MMOL/L (ref 3.5–5.5)
PROT SERPL-MCNC: 7.6 G/DL (ref 6.4–8.2)
SODIUM SERPL-SCNC: 139 MMOL/L (ref 136–145)
VIT B12 SERPL-MCNC: 1927 PG/ML (ref 211–911)

## 2018-09-05 PROCEDURE — 82728 ASSAY OF FERRITIN: CPT | Performed by: PHYSICIAN ASSISTANT

## 2018-09-05 PROCEDURE — 83540 ASSAY OF IRON: CPT | Performed by: PHYSICIAN ASSISTANT

## 2018-09-05 PROCEDURE — 84425 ASSAY OF VITAMIN B-1: CPT | Performed by: PHYSICIAN ASSISTANT

## 2018-09-05 PROCEDURE — 82306 VITAMIN D 25 HYDROXY: CPT | Performed by: PHYSICIAN ASSISTANT

## 2018-09-05 PROCEDURE — 82607 VITAMIN B-12: CPT | Performed by: PHYSICIAN ASSISTANT

## 2018-09-05 PROCEDURE — 80053 COMPREHEN METABOLIC PANEL: CPT | Performed by: PHYSICIAN ASSISTANT

## 2018-09-05 PROCEDURE — 36415 COLL VENOUS BLD VENIPUNCTURE: CPT | Performed by: PHYSICIAN ASSISTANT

## 2018-09-05 RX ORDER — POTASSIUM CHLORIDE 750 MG/1
10 TABLET, EXTENDED RELEASE ORAL 2 TIMES DAILY
Qty: 60 TAB | Refills: 1 | Status: ON HOLD | OUTPATIENT
Start: 2018-09-05 | End: 2018-10-28

## 2018-09-05 NOTE — TELEPHONE ENCOUNTER
I have been waiting for patient's labs and decided to look under scanned items and they were faxed here and scanned and I was not aware of it. I called patient to review them. She was missing the vitamin labs, iron and ferritin. I have placed orders for those labs to be repeated. She is still hypokalemic and I am printing out Rx K and Mg for her [place]. Patient is to contact our office with any problems, worsening of condition, questions or concerns. If we are not available or unable to be reached, patient is to proceed to the Emergency Department.

## 2018-09-08 LAB — VIT B1 BLD-SCNC: 57.7 NMOL/L (ref 66.5–200)

## 2018-09-11 ENCOUNTER — TELEPHONE (OUTPATIENT)
Dept: SURGERY | Age: 38
End: 2018-09-11

## 2018-09-11 NOTE — TELEPHONE ENCOUNTER
I reviewed patient's labs with her. Her iron is low. I told her to take 65mg of iron daily. Pt education given on administration of iron and calcium supplementation. Pt Vit B1 was also low. I asked patient to start taking 100mg of Vit B1 daily. Patient is to contact our office with any problems, worsening of condition, questions or concerns. If we are not available or unable to be reached, patient is to proceed to the Emergency Department.

## 2018-10-01 ENCOUNTER — TELEPHONE (OUTPATIENT)
Dept: SURGERY | Age: 38
End: 2018-10-01

## 2018-10-01 NOTE — TELEPHONE ENCOUNTER
[place] please advise:    Called patient in response to request for addtional labs. Patient answered phone but was unable to talk at length. Informed pt no labs are required prior to appt. Pt is requesting labs. Orders in system.

## 2018-10-12 ENCOUNTER — HOSPITAL ENCOUNTER (OUTPATIENT)
Dept: LAB | Age: 38
Discharge: HOME | End: 2018-10-12
Payer: OTHER GOVERNMENT

## 2018-10-12 LAB
25(OH)D3 SERPL-MCNC: 31.8 NG/ML (ref 30–100)
ALBUMIN SERPL-MCNC: 3.9 G/DL (ref 3.4–5)
ALBUMIN/GLOB SERPL: 1.1 {RATIO} (ref 0.8–1.7)
ALP SERPL-CCNC: 63 U/L (ref 45–117)
ALT SERPL-CCNC: 27 U/L (ref 13–56)
ANION GAP SERPL CALC-SCNC: 11 MMOL/L (ref 3–18)
AST SERPL-CCNC: 21 U/L (ref 15–37)
BASOPHILS # BLD: 0 K/UL (ref 0–0.1)
BASOPHILS NFR BLD: 0 % (ref 0–2)
BILIRUB SERPL-MCNC: 0.4 MG/DL (ref 0.2–1)
BUN SERPL-MCNC: 14 MG/DL (ref 7–18)
BUN/CREAT SERPL: 22 (ref 12–20)
CALCIUM SERPL-MCNC: 9.1 MG/DL (ref 8.5–10.1)
CHLORIDE SERPL-SCNC: 103 MMOL/L (ref 100–108)
CO2 SERPL-SCNC: 27 MMOL/L (ref 21–32)
CREAT SERPL-MCNC: 0.63 MG/DL (ref 0.6–1.3)
DIFFERENTIAL METHOD BLD: ABNORMAL
EOSINOPHIL # BLD: 0.1 K/UL (ref 0–0.4)
EOSINOPHIL NFR BLD: 3 % (ref 0–5)
ERYTHROCYTE [DISTWIDTH] IN BLOOD BY AUTOMATED COUNT: 13.3 % (ref 11.6–14.5)
FERRITIN SERPL-MCNC: 15 NG/ML (ref 8–388)
FOLATE SERPL-MCNC: >20 NG/ML (ref 3.1–17.5)
GLOBULIN SER CALC-MCNC: 3.5 G/DL (ref 2–4)
GLUCOSE SERPL-MCNC: 95 MG/DL (ref 74–99)
HCT VFR BLD AUTO: 35.3 % (ref 35–45)
HGB BLD-MCNC: 11.5 G/DL (ref 12–16)
IRON SERPL-MCNC: 55 UG/DL (ref 50–175)
LYMPHOCYTES # BLD: 1.9 K/UL (ref 0.9–3.6)
LYMPHOCYTES NFR BLD: 52 % (ref 21–52)
MCH RBC QN AUTO: 29.9 PG (ref 24–34)
MCHC RBC AUTO-ENTMCNC: 32.6 G/DL (ref 31–37)
MCV RBC AUTO: 91.7 FL (ref 74–97)
MONOCYTES # BLD: 0.2 K/UL (ref 0.05–1.2)
MONOCYTES NFR BLD: 6 % (ref 3–10)
NEUTS SEG # BLD: 1.4 K/UL (ref 1.8–8)
NEUTS SEG NFR BLD: 39 % (ref 40–73)
PLATELET # BLD AUTO: 162 K/UL (ref 135–420)
PMV BLD AUTO: 9.8 FL (ref 9.2–11.8)
POTASSIUM SERPL-SCNC: 3.2 MMOL/L (ref 3.5–5.5)
PROT SERPL-MCNC: 7.4 G/DL (ref 6.4–8.2)
RBC # BLD AUTO: 3.85 M/UL (ref 4.2–5.3)
SODIUM SERPL-SCNC: 141 MMOL/L (ref 136–145)
VIT B12 SERPL-MCNC: >2000 PG/ML (ref 211–911)
WBC # BLD AUTO: 3.6 K/UL (ref 4.6–13.2)

## 2018-10-12 PROCEDURE — 82306 VITAMIN D 25 HYDROXY: CPT | Performed by: PHYSICIAN ASSISTANT

## 2018-10-12 PROCEDURE — 84425 ASSAY OF VITAMIN B-1: CPT | Performed by: PHYSICIAN ASSISTANT

## 2018-10-12 PROCEDURE — 80053 COMPREHEN METABOLIC PANEL: CPT | Performed by: PHYSICIAN ASSISTANT

## 2018-10-12 PROCEDURE — 85025 COMPLETE CBC W/AUTO DIFF WBC: CPT | Performed by: PHYSICIAN ASSISTANT

## 2018-10-12 PROCEDURE — 83540 ASSAY OF IRON: CPT | Performed by: PHYSICIAN ASSISTANT

## 2018-10-12 PROCEDURE — 82607 VITAMIN B-12: CPT | Performed by: PHYSICIAN ASSISTANT

## 2018-10-12 PROCEDURE — 36415 COLL VENOUS BLD VENIPUNCTURE: CPT | Performed by: PHYSICIAN ASSISTANT

## 2018-10-12 PROCEDURE — 82728 ASSAY OF FERRITIN: CPT | Performed by: PHYSICIAN ASSISTANT

## 2018-10-15 LAB — VIT B1 BLD-SCNC: 59.9 NMOL/L (ref 66.5–200)

## 2018-10-16 ENCOUNTER — OFFICE VISIT (OUTPATIENT)
Dept: SURGERY | Age: 38
End: 2018-10-16

## 2018-10-16 VITALS
TEMPERATURE: 98.1 F | WEIGHT: 158.7 LBS | DIASTOLIC BLOOD PRESSURE: 71 MMHG | HEART RATE: 67 BPM | SYSTOLIC BLOOD PRESSURE: 110 MMHG | RESPIRATION RATE: 16 BRPM | HEIGHT: 58 IN | BODY MASS INDEX: 33.31 KG/M2 | OXYGEN SATURATION: 100 %

## 2018-10-16 DIAGNOSIS — E61.1 IRON DEFICIENCY: ICD-10-CM

## 2018-10-16 DIAGNOSIS — K59.01 SLOW TRANSIT CONSTIPATION: ICD-10-CM

## 2018-10-16 DIAGNOSIS — B37.2 INTERTRIGINOUS CANDIDIASIS: ICD-10-CM

## 2018-10-16 DIAGNOSIS — F50.89 PICA: ICD-10-CM

## 2018-10-16 DIAGNOSIS — K90.9 INTESTINAL MALABSORPTION, UNSPECIFIED TYPE: Primary | ICD-10-CM

## 2018-10-16 DIAGNOSIS — Z98.84 S/P GASTRIC BYPASS: ICD-10-CM

## 2018-10-16 PROBLEM — R11.2 NAUSEA & VOMITING: Status: RESOLVED | Noted: 2018-06-06 | Resolved: 2018-10-16

## 2018-10-16 PROBLEM — R13.10 DYSPHAGIA: Status: RESOLVED | Noted: 2018-06-06 | Resolved: 2018-10-16

## 2018-10-16 RX ORDER — NYSTATIN 100000 U/G
CREAM TOPICAL 2 TIMES DAILY
Qty: 30 G | Refills: 3 | Status: SHIPPED | OUTPATIENT
Start: 2018-10-16 | End: 2018-12-06

## 2018-10-16 RX ORDER — POLYETHYLENE GLYCOL 3350 17 G/17G
17 POWDER, FOR SOLUTION ORAL DAILY
Qty: 1700 G | Refills: 3 | Status: ON HOLD | OUTPATIENT
Start: 2018-10-16 | End: 2018-10-28 | Stop reason: CLARIF

## 2018-10-16 NOTE — PATIENT INSTRUCTIONS
Patient Instructions      1. Restart Potassium and Slow-Mag  2. Start Vit D 3,000iu daily  3. Stop B12 for now and restart Vit B1 100mg daily  4. Referral for anemia  5. Remember hydration goals - minimum of 64 ounces of liquids per day (dehydration is the number one reason for hospital readmission). 6. Continue to monitor carbohydrate and protein intake you need a minimum of  Grams of protein daily- remember to keep your total carbohydrates to 50 grams or less per day for best results. 7. Continue to work towards exercise goals - 60-90 minutes, 5 times a week minimum of deliberate, aerobic exercise is the ultimate goal with strength training 2 times each week. Refer to [employer] for  information. 8. Remember to take vitamins as directed in your handbook. 9. Attend support group the 2nd Thursday of each month. 10. Constipation: Milk of Magnesia is for immediate relief. Miralax is to be used every day if constipation is a chronic problem. 11. Diarrhea: patients will occasionally develop lactose intolerance after surgery. Check to see if your protein shake has whey in it. If it does try one that does not have whey and stop all yogurts, cheeses and milks to see if the diarrhea goes away. 12. Call us at [phone] or email us through [facility]" with questions,     concerns or worsening of condition, we have someone on call 24 hours a day. If you are unable to reach our office, you are to go to your Primary Care Physician or the Emergency Department. Supplement Resource Guide    Importance of Protein:   Maintains lean body mass, produces antibodies to fight off infections, heals wounds, minimizes hair loss, helps to give you energy, helps with satiety, and keeping you full between meals.     Importance of Calcium:  Needed for healthy bones and teeth, normal blood clotting, and nervous system functioning, higher risk of osteoporosis and bone disease with non-compliance. Importance of Multivitamins: Many functions. Supply you with extra nutrients that you may be missing from food. May lead to iron deficiency anemia, weakness, fatigue, and many other symptoms with non-compliance. Importance of B Vitamins:  Important for red blood cell formation, metabolism, energy, and helps to maintain a healthy nervous system. Protein Supplement  Find one you like now. Use immediately after surgery. Look for:  35-50g protein each day from your protein supplement once you reach the progression diet. 0-3 g fat per serving  0-3 g sugar per serving    Protein drinks should be split in separate dosages. Recommend: Lifelong  1 year + Calcium Supplement:     Start taking within a month after surgery. Look for: Calcium Citrate Plus D (1500 mg per day)  Recommend: Citracal     .            Avoid chocolate chewable calcium. Can use chewable bariatric or GNC brand or similar chewable. The body cannot absorb more than 500-600 mg of calcium at a time. Take for Life Multi-vitamin Supplement:      Start immediately after surgery: any complete chewable, such as: [place]s Complete chewables. Avoid [place] sours or gummies. They lack iron and other important nutrients and also have added sugar. Continue with chewable vitamin or change to adult complete multivitamin one month after surgery. Menstruating women can take a prenatal vitamin. Make sure has at least 18 mg iron and 252-435 mcg folic acid   Vitamin T08, B Complex Vitamin, and Biotin  Start taking within a month after surgery. Vitamin B12:  1000 mcg of Vitamin B12 three times weekly    Must take sublingually (meaning you take it under your tongue) or in a liquid drop form for easy absorption. B Complex Vitamin: Take a pill or liquid drop form once daily. Biotin: This vitamin can help prevent hair loss.     Recommend 5mg   (5000 mcg) a day  Biotin is Optional

## 2018-10-16 NOTE — PROGRESS NOTES
Subjective:      Lolis Barlow is a 43 y.o. female is now 6 months status post laparoscopic gastric bypass surgery. Doing well overall. She has lost a total of 71 pounds since surgery. Body mass index is 33.17 kg/(m^2). Has lost 63% of EBW. Currently on a solid food diet without difficulty, reports eating ice several times a day and denies vomiting and abdominal pain. Taking in 60oz water daily. Sources of protein include eggs and chicken. Patient has been walking/jogging 7 days each week. Patient states that she is having odor and pruritis in skin folds. Bowel movements are constipated, has been controlled with Miralax. The patient is not having any pain. . The patient is compliant with multivitamins, calcium and B12 supplements.      Weight Loss Metrics 10/16/2018 8/2/2018 7/5/2018 7/3/2018 6/27/2018 6/12/2018 6/8/2018   Today's Wt 158 lb 11.2 oz 178 lb 14.4 oz 190 lb 3 oz 195 lb 192 lb 8 oz 195 lb 195 lb 12 oz   BMI 33.17 kg/m2 37.39 kg/m2 39.75 kg/m2 40.76 kg/m2 40.23 kg/m2 40.76 kg/m2 40.22 kg/m2          Comorbidities:    Hypertension: not applicable  Diabetes: not applicable  Obstructive Sleep Apnea: not applicable  Hyperlipidemia: not applicable  Stress Urinary Incontinence: not applicable  Gastroesophageal Reflux: resolved  Weight related arthropathy:improved     Patient Active Problem List   Diagnosis Code    Morbid obesity ([facility] E66.01    Constipation K59.00    History of colon polyps Z86.010    Intestinal malabsorption K90.9    S/P gastric bypass Z98.84    Hypokalemia E87.6    Hypomagnesemia E83.42    Iron deficiency E61.1    Pica F50.89    Intertriginous candidiasis B37.2        Past Medical History:   Diagnosis Date    Constipation     Functional dyspepsia     uses OTC meds    GERD (gastroesophageal reflux disease)     History of colon polyps     Morbid obesity ([facility]     Morbid obesity with body mass index (BMI) of 40.0 to 49.9 ([facility])     Nausea & vomiting 6/6/2018 Past Surgical History:   Procedure Laterality Date    COLONOSCOPY      X 3    DELIVERY       X 3    HX DILATION AND CURETTAGE  2005    HX OTHER SURGICAL      scar resection back of head    HX TUBAL LIGATION      HX UROLOGICAL  2001    stent    IR URETERAL STENT PLACEMENT         Current Outpatient Prescriptions   Medication Sig Dispense Refill    nystatin (MYCOSTATIN) topical cream Apply  to affected area two (2) times a day. 30 g 3    polyethylene glycol (MIRALAX) 17 gram/dose powder Take 17 g by mouth daily. 1700 g 3    potassium chloride (KLOR-CON) 10 mEq tablet Take 1 Tab by mouth two (2) times a day. 60 Tab 1    magnesium chloride (SLOW-MAG) 71.5 mg tablet Take 1 Tab by mouth daily. 30 Tab 1    multivit-minerals/folic acid (ADULT MULTIVITAMIN GUMMIES PO) Take  by mouth.  calcium citrate 200 mg (950 mg) tablet Take 200 mg by mouth two (2) times a day.  cyanocobalamin, vitamin B-12, (VITAMIN B12 PO) Take  by mouth two (2) times a day.  cholecalciferol, vitamin D3, (VITAMIN D3 PO) Take 1 Tab by mouth daily.  polyethylene glycol (MIRALAX) 17 gram/dose powder Take 17 g by mouth daily.  1700 g 3       Allergies   Allergen Reactions    Niacin Other (comments)    Morphine Hives and Itching       Review of Systems:  General - No history or complaints of unexpected fever or chills  Head/Neck - No history or complaints of headache or dizziness  Cardiac - No history or complaints of chest pain, palpitations, or shortness of breath  Pulmonary - No history or complaints of shortness of breath or productive cough  Gastrointestinal - as noted above  Genitourinary - No history or complaints of hematuria/dysuria or renal lithiasis  Musculoskeletal - No history or complaints of joint  muscular weakness  Hematologic - No history of any bleeding episodes  Neurologic - No history or complaints of  migraine headaches or neurologic symptoms    Objective:     Visit Vitals    /71 (BP 1 Location: Left arm, BP Patient Position: Sitting)    Pulse 67    Temp 98.1 °F (36.7 °C) (Temporal)    Resp 16    Ht 4' 10\" (1.473 m)    Wt 72 kg (158 lb 11.2 oz)    SpO2 100%    BMI 33.17 kg/m2       General:  alert, cooperative, no distress, appears stated age   Chest: lungs clear to auscultation, breath sounds equal and symmetric, no rhonchi, rales or wheezes, no accessory muscle use   Cor:   Regular rate and rhythm or without murmur or extra heart sounds   Abdomen:  odor in skin folds, soft, bowel sounds active, non-tender, no masses or organomegaly   Incisions:   healed well     Results for Zach Cabral (MRN [id number]) as of 10/16/2018 08:13   Ref. Range 10/12/2018 18:38 10/12/2018 18:41   WBC Latest Ref Range: 4.6 - 13.2 K/uL 3.6 (L)    RBC Latest Ref Range: 4.20 - 5.30 M/uL 3.85 (L)    HGB Latest Ref Range: 12.0 - 16.0 g/dL 11.5 (L)    HCT Latest Ref Range: 35.0 - 45.0 % 35.3    MCV Latest Ref Range: 74.0 - 97.0 FL 91.7    MCH Latest Ref Range: 24.0 - 34.0 PG 29.9    MCHC Latest Ref Range: 31.0 - 37.0 g/dL 32.6    RDW Latest Ref Range: 11.6 - 14.5 % 13.3    PLATELET Latest Ref Range: 135 - 420 K/uL 162    MPV Latest Ref Range: 9.2 - 11.8 FL 9.8    NEUTROPHILS Latest Ref Range: 40 - 73 % 39 (L)    LYMPHOCYTES Latest Ref Range: 21 - 52 % 52    MONOCYTES Latest Ref Range: 3 - 10 % 6    EOSINOPHILS Latest Ref Range: 0 - 5 % 3    BASOPHILS Latest Ref Range: 0 - 2 % 0    DF Latest Units:   AUTOMATED    ABS. NEUTROPHILS Latest Ref Range: 1.8 - 8.0 K/UL 1.4 (L)    ABS. LYMPHOCYTES Latest Ref Range: 0.9 - 3.6 K/UL 1.9    ABS. MONOCYTES Latest Ref Range: 0.05 - 1.2 K/UL 0.2    ABS. EOSINOPHILS Latest Ref Range: 0.0 - 0.4 K/UL 0.1    ABS.  BASOPHILS Latest Ref Range: 0.0 - 0.1 K/UL 0.0    Sodium Latest Ref Range: 136 - 145 mmol/L 141    Potassium Latest Ref Range: 3.5 - 5.5 mmol/L 3.2 (L)    Chloride Latest Ref Range: 100 - 108 mmol/L 103    CO2 Latest Ref Range: 21 - 32 mmol/L 27    Anion gap Latest Ref Range: 3.0 - 18 mmol/L 11    Glucose Latest Ref Range: 74 - 99 mg/dL 95    BUN Latest Ref Range: 7.0 - 18 MG/DL 14    Creatinine Latest Ref Range: 0.6 - 1.3 MG/DL 0.63    BUN/Creatinine ratio Latest Ref Range: 12 - 20   22 (H)    Calcium Latest Ref Range: 8.5 - 10.1 MG/DL 9.1    GFR est non-AA Latest Ref Range: >60 ml/min/1.73m2 >60    GFR est AA Latest Ref Range: >60 ml/min/1.73m2 >60    Bilirubin, total Latest Ref Range: 0.2 - 1.0 MG/DL 0.4    Protein, total Latest Ref Range: 6.4 - 8.2 g/dL 7.4    Albumin Latest Ref Range: 3.4 - 5.0 g/dL 3.9    Globulin Latest Ref Range: 2.0 - 4.0 g/dL 3.5    A-G Ratio Latest Ref Range: 0.8 - 1.7   1.1    ALT (SGPT) Latest Ref Range: 13 - 56 U/L 27    AST Latest Ref Range: 15 - 37 U/L 21    Alk. phosphatase Latest Ref Range: 45 - 117 U/L 63    Vitamin B12 Latest Ref Range: 211 - 911 pg/mL >2000 (H)    Folate Latest Ref Range: 3.10 - 17.50 ng/mL >20.0 (H)    Iron Latest Ref Range: 50 - 175 ug/dL 55    Ferritin Latest Ref Range: 8 - 388 NG/ML 15    VITAMIN B1, WHOLE BLOOD 59.9 Rpt (A)    Vitamin D 25-Hydroxy Latest Ref Range: 30 - 100 ng/mL  31.8   VITAMIN D, 25 HYDROXY Unknown  Rpt       Assessment:   History of Morbid obesity, status post laparoscopic gastric bypass surgery. Doing well postoperatively. PICA - referral to [facility]   Hypokalemia - restart potassium and slow-mag  Thiamine deficiency - restart Vit B1 100mg daily and stop Vit B12 with Folate  Start Vit D 3,000iu daily  Intertriginous Candidiasis - Nystatin cream  Constipation - Titrate dose of Miralax    Plan:       1. Discussed patients weight loss goals and dietary choices in relation to goals. 2. Reminded to measure portions, continue high protein, low carbohydrate diet. Reminded to eat regularly, to eat slowly & not to drink with meals. 3. Continue vitamin supplementation  4. Continue current medications and follow up with PCP for management of regimen. 5. Continue cardio exercise and add resistance exercises. 60-90 minutes of aerobic activity 5 days a week and strength training 2 days each week. 6. Encouraged to attend support group   7. Patient to complete labs before next visit. Lab slip given today. 8. I have discussed this plan with patient and they verbalized understanding  9. Follow up in 3 months or sooner if patient has questions, concerns or worsening of condition, if unable to reach our office, patient should report to the ED. 10. Ms. Saige Faustin has a reminder for a \"due or due soon\" health maintenance. I have asked that she contact her primary care provider for a follow-up on this health maintenance.

## 2018-10-16 NOTE — MR AVS SNAPSHOT
[address] [facility] 
 
 
 [address] 
[phone] Patient: Shelly Thorne MRN: [id number] :[id number] Visit Information Date & Time Provider Department Dept. Phone Encounter #  
 10/16/2018  7:30 AM Kamryn Misha, 82 [facility] [phone] Follow-up Instructions Return in about 3 months (around 2019). Your Appointments 2019  8:00 AM  
Office Visit with Nava Hernandez MD  
[facility] [address] [facility]) Appt Note: f/u  
 [address] [facility] [address] Upcoming Health Maintenance Date Due DTaP/Tdap/Td series (1 - Tdap) 2000 PAP AKA CERVICAL CYTOLOGY 2000 Influenza Age 5 to Adult 2018 Allergies as of 10/16/2018  Review Complete On: 10/16/2018 By: Tori Guerrier LPN Severity Noted Reaction Type Reactions Niacin High 2018   Intolerance Other (comments) Morphine  2017    Hives, Itching Current Immunizations  Reviewed on 2018 No immunizations on file. Not reviewed this visit You Were Diagnosed With   
  
 Codes Comments Intestinal malabsorption, unspecified type    -  Primary ICD-10-CM: K90.9 ICD-9-CM: 579.9 S/P gastric bypass     ICD-10-CM: I10.13 ICD-9-CM: V45.86 Iron deficiency     ICD-10-CM: E61.1 ICD-9-CM: 280.9 Pica     ICD-10-CM: F50.89 ICD-9-CM: 307.52 Intertriginous candidiasis     ICD-10-CM: B37.2 ICD-9-CM: 112.3 Slow transit constipation     ICD-10-CM: K59.01 
ICD-9-CM: 564.01 Vitals BP Pulse Temp Resp Height(growth percentile) 110/71 (BP 1 Location: Left arm, BP Patient Position: Sitting) 67 98.1 °F (36.7 °C) (Temporal) 16 4' 10\" (1.473 m) Weight(growth percentile) SpO2 BMI OB Status Smoking Status 158 lb 11.2 oz (72 kg) 100% 33.17 kg/m2 Having regular periods Never Smoker Vitals History BMI and BSA Data Body Mass Index Body Surface Area  
 33.17 kg/m 2 1.72 m 2 Preferred Pharmacy Pharmacy Name Phone [facility] [phone] Your Updated Medication List  
  
   
This list is accurate as of 10/16/18  8:08 AM.  Always use your most recent med list.  
  
  
  
  
 ADULT MULTIVITAMIN GUMMIES PO Take  by mouth. Biotin 2,500 mcg Cap Take 2,500 mcg by mouth two (2) times a day. calcium citrate 200 mg (950 mg) tablet Take 200 mg by mouth two (2) times a day. FIBER GUMMIES PO Take  by mouth.  
  
 magnesium chloride 71.5 mg tablet Commonly known as:  SLOW-MAG Take 1 Tab by mouth daily. nystatin topical cream  
Commonly known as:  MYCOSTATIN Apply  to affected area two (2) times a day. * polyethylene glycol 17 gram/dose powder Commonly known as:  Deepak Camila Take 17 g by mouth daily. * polyethylene glycol 17 gram/dose powder Commonly known as:  Deepak Camila Take 17 g by mouth daily. potassium chloride 10 mEq tablet Commonly known as:  KLOR-CON Take 1 Tab by mouth two (2) times a day. sucralfate 100 mg/mL suspension Commonly known as:  Betzaida Matas Take 10 mL by mouth four (4) times daily. VITAMIN B12 PO Take  by mouth two (2) times a day. VITAMIN D3 PO Take 1 Tab by mouth daily. ZOFRAN 4 mg tablet Generic drug:  ondansetron hcl Take 4 mg by mouth every eight (8) hours as needed for Nausea. * Notice: This list has 2 medication(s) that are the same as other medications prescribed for you. Read the directions carefully, and ask your doctor or other care provider to review them with you. Prescriptions Printed  Refills  
 nystatin (MYCOSTATIN) topical cream 3  
 Sig: Apply  to affected area two (2) times a day. Class: Print Route: Topical  
 polyethylene glycol (MIRALAX) 17 gram/dose powder 3 Sig: Take 17 g by mouth daily. Class: Print Route: Oral  
  
We Performed the Following REFERRAL TO HEMATOLOGY [VVT84 Custom] Comments:  
 First available provider. Please evaluate for iron deficiency anemia with PICA. Follow-up Instructions Return in about 3 months (around 1/16/2019). Referral Information Referral ID Referred By Referred To  
  
 [id number] Regan Hull MD   
   [address] [facility] Phone: [phone] Fax: [phone] Visits Status Start Date End Date 1 New Request 10/16/18 10/16/19 If your referral has a status of pending review or denied, additional information will be sent to support the outcome of this decision. Patient Instructions Patient Instructions 1. Restart Potassium and Slow-Mag 
2. Start Vit D 3,000iu daily 3. Stop B12 for now and restart Vit B1 100mg daily 4. Referral for anemia 5. Remember hydration goals - minimum of 64 ounces of liquids per day (dehydration is the number one reason for hospital readmission). 6. Continue to monitor carbohydrate and protein intake you need a minimum of  Grams of protein daily- remember to keep your total carbohydrates to 50 grams or less per day for best results. 7. Continue to work towards exercise goals - 60-90 minutes, 5 times a week minimum of deliberate, aerobic exercise is the ultimate goal with strength training 2 times each week. Refer to [employer]topia for  information. 8. Remember to take vitamins as directed in your handbook. 9. Attend support group the 2nd Thursday of each month. 10. Constipation: Milk of Magnesia is for immediate relief.   Miralax is to be used every day if constipation is a chronic problem. 11. Diarrhea: patients will occasionally develop lactose intolerance after surgery. Check to see if your protein shake has whey in it. If it does try one that does not have whey and stop all yogurts, cheeses and milks to see if the diarrhea goes away. 12. Call us at [phone] or email us through [email or web address] with questions,     concerns or worsening of condition, we have someone on call 24 hours a day. If you are unable to reach our office, you are to go to your Primary Care Physician or the Emergency Department. Supplement Resource Guide Importance of Protein:  
Maintains lean body mass, produces antibodies to fight off infections, heals wounds, minimizes hair loss, helps to give you energy, helps with satiety, and keeping you full between meals. Importance of Calcium: 
Needed for healthy bones and teeth, normal blood clotting, and nervous system functioning, higher risk of osteoporosis and bone disease with non-compliance. Importance of Multivitamins: Many functions. Supply you with extra nutrients that you may be missing from food. May lead to iron deficiency anemia, weakness, fatigue, and many other symptoms with non-compliance. Importance of B Vitamins: 
Important for red blood cell formation, metabolism, energy, and helps to maintain a healthy nervous system. Protein Supplement Find one you like now. Use immediately after surgery. Look for: 
35-50g protein each day from your protein supplement once you reach the progression diet. 0-3 g fat per serving 0-3 g sugar per serving Protein drinks should be split in separate dosages. Recommend: Lifelong 1 year +Calcium Supplement:  
 
Start taking within a month after surgery. Look for: Calcium Citrate Plus D (1500 mg per day) Recommend: Citracal 
 
 . Avoid chocolate chewable calcium. Can use chewable bariatric or [employer] brand or similar chewable. The body cannot absorb more than 500-600 mg of calcium at a time. Take for Life Multi-vitamin Supplement:   
Start immediately after surgery: any complete chewable, such as: Dundees Complete chewables. Avoid Dundee sours or gummies. They lack iron and other important nutrients and also have added sugar. Continue with chewable vitamin or change to adult complete multivitamin one month after surgery. Menstruating women can take a prenatal vitamin. Make sure has at least 18 mg iron and 100-784 mcg folic acid Vitamin B12, B Complex Vitamin, and Biotin Start taking within a month after surgery. Vitamin B12:  1000 mcg of Vitamin B12 three times weekly Must take sublingually (meaning you take it under your tongue) or in a liquid drop form for easy absorption. B Complex Vitamin: Take a pill or liquid drop form once daily. Biotin: This vitamin can help prevent hair loss. Recommend 5mg  
(5000 mcg) a day Biotin is Optional  
 
 
 
 
 
 
  
Introducing [facility]! [employer] introduces [employer] patient portal. Now you can access parts of your medical record, email your doctor's office, and request medication refills online. 1. In your internet browser, go to [email or web address] 2. Click on the First Time User? Click Here link in the Sign In box. You will see the New Member Sign Up page. 3. Enter your [employer] Access Code exactly as it appears below. You will not need to use this code after youve completed the sign-up process. If you do not sign up before the expiration date, you must request a new code. · [employer] Access Code: [email or web address] Expires: 12/6/2018  8:29 AM 
 
4. Enter the last four digits of your Social Security Number (xxxx) and Date of Birth (mm/dd/yyyy) as indicated and click Submit. You will be taken to the next sign-up page. 5. Create a [employer] ID.  This will be your [employer] login ID and cannot be changed, so think of one that is secure and easy to remember. 6. Create a [employer] password. You can change your password at any time. 7. Enter your Password Reset Question and Answer. This can be used at a later time if you forget your password. 8. Enter your e-mail address. You will receive e-mail notification when new information is available in [address] 9. Click Sign Up. You can now view and download portions of your medical record. 10. Click the Download Summary menu link to download a portable copy of your medical information. If you have questions, please visit the Frequently Asked Questions section of the [employer] website. Remember, [employer] is NOT to be used for urgent needs. For medical emergencies, dial 911. Now available from your iPhone and Android! Please provide this summary of care documentation to your next provider. Your primary care clinician is listed as Phys Other. If you have any questions after today's visit, please call [phone].

## 2018-10-28 ENCOUNTER — ANESTHESIA EVENT (OUTPATIENT)
Dept: SURGERY | Age: 38
DRG: 330 | End: 2018-10-28
Payer: OTHER GOVERNMENT

## 2018-10-28 ENCOUNTER — ANESTHESIA (OUTPATIENT)
Dept: SURGERY | Age: 38
DRG: 330 | End: 2018-10-28
Payer: OTHER GOVERNMENT

## 2018-10-28 ENCOUNTER — HOSPITAL ENCOUNTER (INPATIENT)
Age: 38
LOS: 1 days | Discharge: HOME | DRG: 330 | End: 2018-10-28
Attending: SPECIALIST | Admitting: SPECIALIST
Payer: OTHER GOVERNMENT

## 2018-10-28 VITALS
SYSTOLIC BLOOD PRESSURE: 123 MMHG | RESPIRATION RATE: 14 BRPM | TEMPERATURE: 98.5 F | HEART RATE: 66 BPM | OXYGEN SATURATION: 98 % | DIASTOLIC BLOOD PRESSURE: 75 MMHG

## 2018-10-28 DIAGNOSIS — G89.18 POST-OP PAIN: Primary | ICD-10-CM

## 2018-10-28 PROBLEM — K45.8 INTERNAL HERNIA: Status: ACTIVE | Noted: 2018-10-28

## 2018-10-28 LAB
ANION GAP SERPL CALC-SCNC: 10 MMOL/L (ref 3–18)
BUN SERPL-MCNC: 9 MG/DL (ref 7–18)
BUN/CREAT SERPL: 14
CALCIUM SERPL-MCNC: 8.8 MG/DL (ref 8.5–10.1)
CHLORIDE SERPL-SCNC: 109 MMOL/L (ref 100–108)
CO2 SERPL-SCNC: 23 MMOL/L (ref 21–32)
CREAT SERPL-MCNC: 0.65 MG/DL (ref 0.6–1.3)
ERYTHROCYTE [DISTWIDTH] IN BLOOD BY AUTOMATED COUNT: 13.5 % (ref 11.6–14.5)
GLUCOSE SERPL-MCNC: 76 MG/DL (ref 74–99)
HCG SERPL QL: NEGATIVE
HCT VFR BLD AUTO: 32.6 % (ref 35–45)
HGB BLD-MCNC: 10.6 G/DL (ref 12–16)
MCH RBC QN AUTO: 29.8 PG (ref 24–34)
MCHC RBC AUTO-ENTMCNC: 32.5 G/DL (ref 31–37)
MCV RBC AUTO: 91.6 FL (ref 74–97)
PLATELET # BLD AUTO: 159 K/UL (ref 135–420)
PMV BLD AUTO: 10 FL (ref 9.2–11.8)
POTASSIUM SERPL-SCNC: 3.7 MMOL/L (ref 3.5–5.5)
RBC # BLD AUTO: 3.56 M/UL (ref 4.2–5.3)
SODIUM SERPL-SCNC: 142 MMOL/L (ref 136–145)
WBC # BLD AUTO: 3.8 K/UL (ref 4.6–13.2)

## 2018-10-28 PROCEDURE — 77030018836 HC SOL IRR NACL ICUM -A: Performed by: SPECIALIST

## 2018-10-28 PROCEDURE — 84703 CHORIONIC GONADOTROPIN ASSAY: CPT | Performed by: ANESTHESIOLOGY

## 2018-10-28 PROCEDURE — 77030010030: Performed by: SPECIALIST

## 2018-10-28 PROCEDURE — 74011000250 HC RX REV CODE- 250

## 2018-10-28 PROCEDURE — 77030006643: Performed by: ANESTHESIOLOGY

## 2018-10-28 PROCEDURE — 74011250636 HC RX REV CODE- 250/636: Performed by: SPECIALIST

## 2018-10-28 PROCEDURE — 76060000032 HC ANESTHESIA 0.5 TO 1 HR: Performed by: SPECIALIST

## 2018-10-28 PROCEDURE — 77030013079 HC BLNKT BAIR HGGR 3M -A: Performed by: ANESTHESIOLOGY

## 2018-10-28 PROCEDURE — 76010000138 HC OR TIME 0.5 TO 1 HR: Performed by: SPECIALIST

## 2018-10-28 PROCEDURE — 77030016151 HC PROTCTR LNS DFOG COVD -B: Performed by: SPECIALIST

## 2018-10-28 PROCEDURE — 77030008683 HC TU ET CUF COVD -A: Performed by: ANESTHESIOLOGY

## 2018-10-28 PROCEDURE — 77030008477 HC STYL SATN SLP COVD -A: Performed by: ANESTHESIOLOGY

## 2018-10-28 PROCEDURE — 77030002933 HC SUT MCRYL J&J -A: Performed by: SPECIALIST

## 2018-10-28 PROCEDURE — 0DS84ZZ REPOSITION SMALL INTESTINE, PERCUTANEOUS ENDOSCOPIC APPROACH: ICD-10-PCS | Performed by: SPECIALIST

## 2018-10-28 PROCEDURE — C9113 INJ PANTOPRAZOLE SODIUM, VIA: HCPCS | Performed by: SPECIALIST

## 2018-10-28 PROCEDURE — 77030038020 HC MANFLD NEPTUNE STRY -B: Performed by: SPECIALIST

## 2018-10-28 PROCEDURE — 0WQF4ZZ REPAIR ABDOMINAL WALL, PERCUTANEOUS ENDOSCOPIC APPROACH: ICD-10-PCS | Performed by: SPECIALIST

## 2018-10-28 PROCEDURE — 77030002912 HC SUT ETHBND J&J -A: Performed by: SPECIALIST

## 2018-10-28 PROCEDURE — 85027 COMPLETE CBC AUTOMATED: CPT | Performed by: SPECIALIST

## 2018-10-28 PROCEDURE — 65270000029 HC RM PRIVATE

## 2018-10-28 PROCEDURE — 76210000063 HC OR PH I REC FIRST 0.5 HR: Performed by: SPECIALIST

## 2018-10-28 PROCEDURE — 36415 COLL VENOUS BLD VENIPUNCTURE: CPT | Performed by: ANESTHESIOLOGY

## 2018-10-28 PROCEDURE — 77030008602 HC TRCR ENDOSC EPATH J&J -B: Performed by: SPECIALIST

## 2018-10-28 PROCEDURE — 77030008603 HC TRCR ENDOSC EPATH J&J -C: Performed by: SPECIALIST

## 2018-10-28 PROCEDURE — 74011250636 HC RX REV CODE- 250/636: Performed by: ANESTHESIOLOGY

## 2018-10-28 PROCEDURE — 74011250636 HC RX REV CODE- 250/636

## 2018-10-28 PROCEDURE — 74011000250 HC RX REV CODE- 250: Performed by: SPECIALIST

## 2018-10-28 PROCEDURE — 77030003580 HC NDL INSUF VERES J&J -B: Performed by: SPECIALIST

## 2018-10-28 PROCEDURE — 0DNW4ZZ RELEASE PERITONEUM, PERCUTANEOUS ENDOSCOPIC APPROACH: ICD-10-PCS | Performed by: SPECIALIST

## 2018-10-28 PROCEDURE — 80048 BASIC METABOLIC PNL TOTAL CA: CPT | Performed by: SPECIALIST

## 2018-10-28 RX ORDER — HYDROMORPHONE HYDROCHLORIDE 1 MG/ML
0.5 INJECTION, SOLUTION INTRAMUSCULAR; INTRAVENOUS; SUBCUTANEOUS
Status: CANCELLED | OUTPATIENT
Start: 2018-10-28

## 2018-10-28 RX ORDER — ONDANSETRON 2 MG/ML
4 INJECTION INTRAMUSCULAR; INTRAVENOUS
Status: DISCONTINUED | OUTPATIENT
Start: 2018-10-28 | End: 2018-10-28 | Stop reason: HOSPADM

## 2018-10-28 RX ORDER — FENTANYL CITRATE 50 UG/ML
INJECTION, SOLUTION INTRAMUSCULAR; INTRAVENOUS AS NEEDED
Status: DISCONTINUED | OUTPATIENT
Start: 2018-10-28 | End: 2018-10-28 | Stop reason: HOSPADM

## 2018-10-28 RX ORDER — ONDANSETRON 2 MG/ML
INJECTION INTRAMUSCULAR; INTRAVENOUS AS NEEDED
Status: DISCONTINUED | OUTPATIENT
Start: 2018-10-28 | End: 2018-10-28 | Stop reason: HOSPADM

## 2018-10-28 RX ORDER — SODIUM CHLORIDE, SODIUM LACTATE, POTASSIUM CHLORIDE, CALCIUM CHLORIDE 600; 310; 30; 20 MG/100ML; MG/100ML; MG/100ML; MG/100ML
125 INJECTION, SOLUTION INTRAVENOUS CONTINUOUS
Status: DISCONTINUED | OUTPATIENT
Start: 2018-10-28 | End: 2018-10-28 | Stop reason: HOSPADM

## 2018-10-28 RX ORDER — HYDROMORPHONE HYDROCHLORIDE 2 MG/ML
0.5 INJECTION, SOLUTION INTRAMUSCULAR; INTRAVENOUS; SUBCUTANEOUS
Status: DISCONTINUED | OUTPATIENT
Start: 2018-10-28 | End: 2018-10-28 | Stop reason: HOSPADM

## 2018-10-28 RX ORDER — NALOXONE HYDROCHLORIDE 0.4 MG/ML
0.4 INJECTION, SOLUTION INTRAMUSCULAR; INTRAVENOUS; SUBCUTANEOUS AS NEEDED
Status: CANCELLED | OUTPATIENT
Start: 2018-10-28

## 2018-10-28 RX ORDER — ACETAMINOPHEN 10 MG/ML
1000 INJECTION, SOLUTION INTRAVENOUS EVERY 6 HOURS
Status: CANCELLED | OUTPATIENT
Start: 2018-10-28 | End: 2018-10-29

## 2018-10-28 RX ORDER — PROPOFOL 10 MG/ML
INJECTION, EMULSION INTRAVENOUS AS NEEDED
Status: DISCONTINUED | OUTPATIENT
Start: 2018-10-28 | End: 2018-10-28 | Stop reason: HOSPADM

## 2018-10-28 RX ORDER — SODIUM CHLORIDE, SODIUM LACTATE, POTASSIUM CHLORIDE, CALCIUM CHLORIDE 600; 310; 30; 20 MG/100ML; MG/100ML; MG/100ML; MG/100ML
150 INJECTION, SOLUTION INTRAVENOUS CONTINUOUS
Status: DISCONTINUED | OUTPATIENT
Start: 2018-10-28 | End: 2018-10-28 | Stop reason: HOSPADM

## 2018-10-28 RX ORDER — KETOROLAC TROMETHAMINE 30 MG/ML
INJECTION, SOLUTION INTRAMUSCULAR; INTRAVENOUS AS NEEDED
Status: DISCONTINUED | OUTPATIENT
Start: 2018-10-28 | End: 2018-10-28 | Stop reason: HOSPADM

## 2018-10-28 RX ORDER — GLYCOPYRROLATE 0.2 MG/ML
INJECTION INTRAMUSCULAR; INTRAVENOUS AS NEEDED
Status: DISCONTINUED | OUTPATIENT
Start: 2018-10-28 | End: 2018-10-28 | Stop reason: HOSPADM

## 2018-10-28 RX ORDER — ROCURONIUM BROMIDE 10 MG/ML
INJECTION, SOLUTION INTRAVENOUS AS NEEDED
Status: DISCONTINUED | OUTPATIENT
Start: 2018-10-28 | End: 2018-10-28 | Stop reason: HOSPADM

## 2018-10-28 RX ORDER — KETOROLAC TROMETHAMINE 30 MG/ML
15 INJECTION, SOLUTION INTRAMUSCULAR; INTRAVENOUS
Status: DISCONTINUED | OUTPATIENT
Start: 2018-10-28 | End: 2018-10-28 | Stop reason: HOSPADM

## 2018-10-28 RX ORDER — SODIUM CHLORIDE, SODIUM LACTATE, POTASSIUM CHLORIDE, CALCIUM CHLORIDE 600; 310; 30; 20 MG/100ML; MG/100ML; MG/100ML; MG/100ML
150 INJECTION, SOLUTION INTRAVENOUS CONTINUOUS
Status: CANCELLED | OUTPATIENT
Start: 2018-10-28

## 2018-10-28 RX ORDER — SODIUM CHLORIDE 0.9 % (FLUSH) 0.9 %
5-10 SYRINGE (ML) INJECTION AS NEEDED
Status: DISCONTINUED | OUTPATIENT
Start: 2018-10-28 | End: 2018-10-28 | Stop reason: HOSPADM

## 2018-10-28 RX ORDER — NEOSTIGMINE METHYLSULFATE 5 MG/5 ML
SYRINGE (ML) INTRAVENOUS AS NEEDED
Status: DISCONTINUED | OUTPATIENT
Start: 2018-10-28 | End: 2018-10-28 | Stop reason: HOSPADM

## 2018-10-28 RX ORDER — MIDAZOLAM HYDROCHLORIDE 1 MG/ML
INJECTION, SOLUTION INTRAMUSCULAR; INTRAVENOUS AS NEEDED
Status: DISCONTINUED | OUTPATIENT
Start: 2018-10-28 | End: 2018-10-28 | Stop reason: HOSPADM

## 2018-10-28 RX ORDER — KETOROLAC TROMETHAMINE 30 MG/ML
INJECTION, SOLUTION INTRAMUSCULAR; INTRAVENOUS
Status: COMPLETED
Start: 2018-10-28 | End: 2018-10-28

## 2018-10-28 RX ORDER — CEFAZOLIN SODIUM 1 G/3ML
INJECTION, POWDER, FOR SOLUTION INTRAMUSCULAR; INTRAVENOUS AS NEEDED
Status: DISCONTINUED | OUTPATIENT
Start: 2018-10-28 | End: 2018-10-28 | Stop reason: HOSPADM

## 2018-10-28 RX ORDER — SUCCINYLCHOLINE CHLORIDE 20 MG/ML
INJECTION INTRAMUSCULAR; INTRAVENOUS AS NEEDED
Status: DISCONTINUED | OUTPATIENT
Start: 2018-10-28 | End: 2018-10-28 | Stop reason: HOSPADM

## 2018-10-28 RX ORDER — FENTANYL CITRATE 50 UG/ML
50 INJECTION, SOLUTION INTRAMUSCULAR; INTRAVENOUS
Status: DISCONTINUED | OUTPATIENT
Start: 2018-10-28 | End: 2018-10-28 | Stop reason: HOSPADM

## 2018-10-28 RX ORDER — LIDOCAINE HYDROCHLORIDE 20 MG/ML
INJECTION, SOLUTION EPIDURAL; INFILTRATION; INTRACAUDAL; PERINEURAL AS NEEDED
Status: DISCONTINUED | OUTPATIENT
Start: 2018-10-28 | End: 2018-10-28 | Stop reason: HOSPADM

## 2018-10-28 RX ORDER — OXYCODONE AND ACETAMINOPHEN 5; 325 MG/1; MG/1
1 TABLET ORAL
Qty: 25 TAB | Refills: 0 | Status: SHIPPED | OUTPATIENT
Start: 2018-10-28 | End: 2018-12-06

## 2018-10-28 RX ADMIN — SODIUM CHLORIDE, SODIUM LACTATE, POTASSIUM CHLORIDE, AND CALCIUM CHLORIDE 150 ML/HR: 600; 310; 30; 20 INJECTION, SOLUTION INTRAVENOUS at 10:40

## 2018-10-28 RX ADMIN — KETOROLAC TROMETHAMINE 15 MG: 30 INJECTION, SOLUTION INTRAMUSCULAR; INTRAVENOUS at 12:18

## 2018-10-28 RX ADMIN — FENTANYL CITRATE 100 MCG: 50 INJECTION, SOLUTION INTRAMUSCULAR; INTRAVENOUS at 11:36

## 2018-10-28 RX ADMIN — MIDAZOLAM HYDROCHLORIDE 2 MG: 1 INJECTION, SOLUTION INTRAMUSCULAR; INTRAVENOUS at 11:31

## 2018-10-28 RX ADMIN — Medication 3 MG: at 12:07

## 2018-10-28 RX ADMIN — CEFAZOLIN SODIUM 1 G: 1 INJECTION, POWDER, FOR SOLUTION INTRAMUSCULAR; INTRAVENOUS at 11:45

## 2018-10-28 RX ADMIN — SODIUM CHLORIDE, SODIUM LACTATE, POTASSIUM CHLORIDE, AND CALCIUM CHLORIDE 1000 ML: 600; 310; 30; 20 INJECTION, SOLUTION INTRAVENOUS at 10:39

## 2018-10-28 RX ADMIN — SODIUM CHLORIDE, SODIUM LACTATE, POTASSIUM CHLORIDE, AND CALCIUM CHLORIDE 150 ML/HR: 600; 310; 30; 20 INJECTION, SOLUTION INTRAVENOUS at 10:56

## 2018-10-28 RX ADMIN — PROPOFOL 150 MG: 10 INJECTION, EMULSION INTRAVENOUS at 11:39

## 2018-10-28 RX ADMIN — SUCCINYLCHOLINE CHLORIDE 100 MG: 20 INJECTION INTRAMUSCULAR; INTRAVENOUS at 11:39

## 2018-10-28 RX ADMIN — ONDANSETRON 4 MG: 2 INJECTION INTRAMUSCULAR; INTRAVENOUS at 11:58

## 2018-10-28 RX ADMIN — ROCURONIUM BROMIDE 5 MG: 10 INJECTION, SOLUTION INTRAVENOUS at 11:39

## 2018-10-28 RX ADMIN — LIDOCAINE HYDROCHLORIDE 60 MG: 20 INJECTION, SOLUTION EPIDURAL; INFILTRATION; INTRACAUDAL; PERINEURAL at 11:39

## 2018-10-28 RX ADMIN — PANTOPRAZOLE SODIUM 40 MG: 40 INJECTION, POWDER, FOR SOLUTION INTRAVENOUS at 10:40

## 2018-10-28 RX ADMIN — GLYCOPYRROLATE 0.5 MG: 0.2 INJECTION INTRAMUSCULAR; INTRAVENOUS at 12:07

## 2018-10-28 RX ADMIN — ROCURONIUM BROMIDE 25 MG: 10 INJECTION, SOLUTION INTRAVENOUS at 11:49

## 2018-10-28 RX ADMIN — SODIUM CHLORIDE, SODIUM LACTATE, POTASSIUM CHLORIDE, AND CALCIUM CHLORIDE 125 ML/HR: 600; 310; 30; 20 INJECTION, SOLUTION INTRAVENOUS at 13:21

## 2018-10-28 NOTE — ROUTINE PROCESS
05:55: Pt received as a transfer from [place] at this time. Pt arrived via [facility] with two paramedics in attendance. Pt was able to transfer independently to bed. V/S nominal at this time as follows:    Patient Vitals for the past 4 hrs:   Temp Pulse Resp BP   10/28/18 0601 98.5 °F (36.9 °C) (!) 57 20 102/65       Dual skin assessment performed at this time. Six (6) lap sites to abdomen from procedure performed in April 2018 present as CDI. Pt has tattoos to her right leg/foot and left shoulder. Integument otherwise intact. Pt was medicated for pain and nausea prior to transport X 1 hour ago (dilaudid and zofran) respectively. Pt is somnulent and verbaalized her desire to \"just sleep a while. \" Attending physician contacted and informed of pt arrival. Orders for LR continuous infusion issued at this time. Monitoring ongoing. Bedside, Verbal and Written shift change report given to  (oncoming nurse) by ADRIEN Lawrence (offgoing nurse). Report included the following information SBAR, Kardex, MAR and Recent Results.

## 2018-10-28 NOTE — H&P
Surgery Consultation    History of Present Illness:    Horacio Souza is a prior laparoscopic gastric bypass surgery   patient who underwent their weight loss surgical procedure procedure approximately 6 months ago. They now present with a history of abdominal pain. Onset was 1 day ago. The pain is described as pulling and pinching and is 6/10 in intensity. Starts in epigastric,does radiate to back. Symptoms have been the same since onset. The patient also gives a history of nausea. The patient denies obstipation, constipation and vomiting. The patient did go to the emergency room at [facility] prior to this visit with me for this issue and has since been transferred here for further care.     Past Medical History:   Diagnosis Date    Constipation     Functional dyspepsia     uses OTC meds    GERD (gastroesophageal reflux disease)     History of colon polyps     Morbid obesity (HCC)     Morbid obesity with body mass index (BMI) of 40.0 to 49.9 (HCC)     Nausea & vomiting 2018     Past Surgical History:   Procedure Laterality Date    COLONOSCOPY      X 3    DELIVERY       X 3    HX DILATION AND CURETTAGE      HX OTHER SURGICAL      scar resection back of head    HX TUBAL LIGATION      HX UROLOGICAL  2001    stent    IR URETERAL STENT PLACEMENT        Family History   Problem Relation Age of Onset    Hypertension Mother     Obesity Mother     Hypertension Father      Social History     Socioeconomic History    Marital status:      Spouse name: Not on file    Number of children: Not on file    Years of education: Not on file    Highest education level: Not on file   Social Needs    Financial resource strain: Not on file    Food insecurity - worry: Not on file    Food insecurity - inability: Not on file   [employer] needs - medical: Not on file   [employer] needs - non-medical: Not on file   Occupational History    Not on file   Tobacco Use    Smoking status: Never Smoker    Smokeless tobacco: Never Used   Substance and Sexual Activity    Alcohol use: Yes     Comment: rare    Drug use: No    Sexual activity: Yes     Partners: Male     Birth control/protection: Surgical   Other Topics Concern    Not on file   Social History Narrative    Not on file      Current Facility-Administered Medications   Medication Dose Route Frequency    lactated Ringers infusion  150 mL/hr IntraVENous CONTINUOUS    ondansetron (ZOFRAN) injection 4 mg  4 mg IntraVENous Q4H PRN    pantoprazole (PROTONIX) 40 mg in sodium chloride 0.9% 10 mL injection  40 mg IntraVENous ONCE    lactated ringers bolus infusion 1,000 mL  1,000 mL IntraVENous ONCE      Allergies   Allergen Reactions    Niacin Other (comments)    Morphine Hives and Itching       Review of Symptoms:     General - No history or complaints of unexpected fever, chills, or weight loss  Head/Neck - No history or complaints of headache, diplopia, dysphagia, hearing loss  Cardiac - No history or complaints of chest pain, palpitations, murmur, or shortness of breath  Pulmonary - No history or complaints of shortness of breath, productive cough, hemoptysis  Gastrointestinal - As per the HPI above.   Genitourinary - No history or complaints of hematuria/dysuria, stress urinary incontinence symptoms, or renal lithiasis  Musculoskeletal - No history or complaints of joint pain or muscular weakness  Hematologic - No history or complaints of bleeding disorders, blood transfusions, sickle cell anemia  Neurologic - No history or complaints of  migraine headaches, seizure activity, syncopal episodes, TIA or stroke  Integumentary - No history or complaints of rashes, abnormal nevi, skin cancer  Gynecological - No history of heavy menses/abnormal menses        Physical Exam:     Physical Examination: General appearance - alert, well appearing, and in no distress and oriented to person, place, and time  Mental status - alert, oriented to person, place, and time, normal mood, behavior, speech, dress, motor activity, and thought processes  Eyes - pupils equal and reactive, extraocular eye movements intact, sclera anicteric, left eye normal, right eye normal  Ears - right ear normal, left ear normal  Nose - normal and patent, no erythema, discharge or polyps and not examined  Mouth - mucous membranes moist, pharynx normal without lesions and not examined  Neck - supple, no significant adenopathy  Lymphatics - no palpable lymphadenopathy, no hepatosplenomegaly  Chest - clear to auscultation, no wheezes, rales or rhonchi, symmetric air entry  Heart - normal rate, regular rhythm, normal S1, S2, no murmurs, rubs, clicks or gallops  Abdomen - soft, nontender, nondistended, no masses or organomegaly  Back exam - full range of motion, no tenderness, palpable spasm or pain on motion  Neurological - alert, oriented, normal speech, no focal findings or movement disorder noted  Musculoskeletal - no joint tenderness, deformity or swelling  Extremities - peripheral pulses normal, no pedal edema, no clubbing or cyanosis  Skin - normal coloration and turgor, no rashes, no suspicious skin lesions noted            Laboratory / X-Rays:      Lab Results   Component Value Date/Time    WBC 3.6 (L) 10/12/2018 06:38 PM    HGB 11.5 (L) 10/12/2018 06:38 PM    HCT 35.3 10/12/2018 06:38 PM    PLATELET 790 56/64/5589 06:38 PM    MCV 91.7 10/12/2018 06:38 PM     Lab Results   Component Value Date/Time    Sodium 141 10/12/2018 06:38 PM    Potassium 3.2 (L) 10/12/2018 06:38 PM    Chloride 103 10/12/2018 06:38 PM    CO2 27 10/12/2018 06:38 PM    Anion gap 11 10/12/2018 06:38 PM    Glucose 95 10/12/2018 06:38 PM    BUN 14 10/12/2018 06:38 PM    Creatinine 0.63 10/12/2018 06:38 PM    BUN/Creatinine ratio 22 (H) 10/12/2018 06:38 PM    GFR est AA >60 10/12/2018 06:38 PM    GFR est non-AA >60 10/12/2018 06:38 PM    Calcium 9.1 10/12/2018 06:38 PM    Bilirubin, total 0.4 10/12/2018 06:38 PM    AST (SGOT) 21 10/12/2018 06:38 PM    Alk. phosphatase 63 10/12/2018 06:38 PM    Protein, total 7.4 10/12/2018 06:38 PM    Albumin 3.9 10/12/2018 06:38 PM    Globulin 3.5 10/12/2018 06:38 PM    A-G Ratio 1.1 10/12/2018 06:38 PM    ALT (SGPT) 27 10/12/2018 06:38 PM     Lab Results   Component Value Date/Time    Iron 55 10/12/2018 06:38 PM    Ferritin 15 10/12/2018 06:38 PM     Lab Results   Component Value Date/Time    Folate >20.0 (H) 10/12/2018 06:38 PM     Lab Results   Component Value Date/Time    Vitamin D 25-Hydroxy 31.8 10/12/2018 06:41 PM           All labs and x-rays reviewed from their ER visit and outside evaluations. It has all been scanned under the media tab or is included within [facility] or Care everywhere. Assessment:     Lalo Colorado is a prior laparoscopic gastric bypass surgery   patient who underwent their weight loss surgical procedure procedure   approximately 6 months ago. They now have abdominal pain and evidence of  a possible internal hernia on CT scan. Recommendations: The diagnosis was discussed with the patient and evaluation and treatment plans outlined. Plan is for diagnostic laparoscopy.     Signed By: Damari Webb MD     October 28, 2018

## 2018-10-28 NOTE — ROUTINE PROCESS
Bedside shift change report given to DANNY Conner (oncoming nurse) by Detra Rubinstein, RN (offgoing nurse). Report included the following information SBAR, Kardex, Intake/Output and MAR.

## 2018-10-28 NOTE — BRIEF OP NOTE
BRIEF OPERATIVE NOTE    Date of Procedure: 10/28/2018   Preoperative Diagnosis: internal hernia  Postoperative Diagnosis: ABDOMINAL ADHESIONS    Procedure(s):  LAPAROSCOPY GENERAL DIAGNOSTIC, LYSIS OF ADHESIONS  Surgeon(s) and Role:     * Barrington Leon MD - Primary         Surgical Assistant: Michelle Solomon    Surgical Staff:  Circ-1: Yong Mtz RN  Physician Assistant: CHIKIS Levine  Scrub Tech-1: Chris Antis B  Surg Asst-1: Bre Delcid  Event Time In Time Out   Incision Start 1150    Incision Close       Anesthesia: General   Estimated Blood Loss: none  Specimens: * No specimens in log *   Findings: adhesions causing partial obstruction from pelvic surgery   Complications: none  Implants: * No implants in log *

## 2018-10-28 NOTE — PERIOP NOTES
TRANSFER - IN REPORT:    Verbal report received from Luis Fernando Singh. CRNA(name) on Horacio Souza  being received from OR(unit) for routine progression of care      Report consisted of patients Situation, Background, Assessment and   Recommendations(SBAR). Information from the following report(s) SBAR, OR Summary, Procedure Summary and Intake/Output was reviewed with the receiving nurse. Opportunity for questions and clarification was provided. Assessment completed upon patients arrival to unit and care assumed.

## 2018-10-28 NOTE — ANESTHESIA POSTPROCEDURE EVALUATION
Procedure(s): LAPAROSCOPY GENERAL DIAGNOSTIC, LYSIS OF ADHESIONS. Anesthesia Post Evaluation Multimodal analgesia: multimodal analgesia used between 6 hours prior to anesthesia start to PACU discharge Patient location during evaluation: PACU Patient participation: complete - patient participated Level of consciousness: awake and alert Pain score: 0 Pain management: adequate Airway patency: patent Anesthetic complications: no 
Cardiovascular status: acceptable Respiratory status: acceptable Hydration status: acceptable Visit Vitals /63 Pulse 64 Temp 36.7 °C (98.1 °F) Resp 26 SpO2 100% Breastfeeding?  No

## 2018-10-28 NOTE — DISCHARGE INSTRUCTIONS
Acute Pain After Surgery: Care Instructions  Your Care Instructions    It's common to have some pain after surgery. Pain doesn't mean that something is wrong or that the surgery didn't go well. But when the pain is severe, it's important to work with your doctor to manage it. It's also important to be aware of a few facts about pain and pain medicine. · You are the only person who knows what your pain feels like. So be sure to tell your doctor when you are in pain or when the pain changes. Then he or she will know how to adjust your medicines. · Pain is often easier to control right after it starts. So it may be better to take regular doses of pain medicine and not wait until the pain gets bad. · Medicine can help control pain. But this doesn't mean you'll have no pain. Medicine works to keep the pain at a level you can live with. With time, you will feel better. Follow-up care is a key part of your treatment and safety. Be sure to make and go to all appointments, and call your doctor if you are having problems. It's also a good idea to know your test results and keep a list of the medicines you take. How can you care for yourself at home? · Be safe with medicines. Read and follow all instructions on the label. ? If the doctor gave you a prescription medicine for pain, take it as prescribed. ? If you are not taking a prescription pain medicine, ask your doctor if you can take an over-the-counter medicine. · If you take an over-the-counter pain medicine, such as acetaminophen (Tylenol), ibuprofen (Advil, Motrin), or naproxen (Aleve), read and follow all instructions on the label. · Do not take two or more pain medicines at the same time unless the doctor told you to. · Do not drink alcohol while you are taking pain medicines. · Try to walk each day if your doctor recommends it. Start by walking a little more than you did the day before. Bit by bit, increase the amount you walk.  Walking increases blood flow. It also helps prevent pneumonia and constipation. · To prevent constipation from opioid pain medicines:  ? Talk to your doctor about a laxative. ? Include fruits, vegetables, beans, and whole grains in your diet each day. These foods are high in fiber. ? Drink plenty of fluids, enough so that your urine is light yellow or clear like water. Drink water, fruit juice, or other drinks that do not contain caffeine or alcohol. If you have kidney, heart, or liver disease and have to limit fluids, talk with your doctor before you increase the amount of fluids you drink. ? Take a fiber supplement, such as Citrucel or Metamucil, every day if needed. Read and follow all instructions on the label. If you take pain medicine for more than a few days, talk to your doctor before you take fiber. When should you call for help? Call your doctor now or seek immediate medical care if:    · Your pain gets worse.     · Your pain is not controlled by medicine.    Watch closely for changes in your health, and be sure to contact your doctor if you have any problems. Where can you learn more? Go to http://jorge-prisca.info/. Enter ([phone] [phone] in the search box to learn more about \"Acute Pain After Surgery: Care Instructions. \"  Current as of: November 20, 2017  Content Version: 11.8  © 5259-4613 [employer]. Care instructions adapted under license by [employer] (which disclaims liability or warranty for this information). If you have questions about a medical condition or this instruction, always ask your healthcare professional. [place] [address] any warranty or liability for your use of this information.

## 2018-10-28 NOTE — PERIOP NOTES
TRANSFER - OUT REPORT:    Verbal report given to Ever Chakraborty RN(name) on Dai Long  being transferred to [address] [place] [address](unit) for routine progression of care       Report consisted of patients Situation, Background, Assessment and   Recommendations(SBAR). Information from the following report(s) SBAR, OR Summary, Procedure Summary, Intake/Output and MAR was reviewed with the receiving nurse. Lines:   Peripheral IV Left Antecubital (Active)   Site Assessment Clean, dry, & intact 10/28/2018 12:21 PM   Phlebitis Assessment 0 10/28/2018 12:21 PM   Infiltration Assessment 0 10/28/2018 12:21 PM   Dressing Status Clean, dry, & intact 10/28/2018 12:17 PM   Dressing Type Transparent;Tape 10/28/2018 12:21 PM   Hub Color/Line Status Pink; Infusing 10/28/2018 12:21 PM        Opportunity for questions and clarification was provided.       Patient transported with:   Registered Nurse

## 2018-10-28 NOTE — PROGRESS NOTES
Chart reviewed as CM on call. Patient received this morning as transfer from [place] for further care; will follow for CM needs. Reason for Admission:                      RRAT Score:          6 low           Plan for utilizing home health:                          Likelihood of Readmission:                           Transition of Care Plan:             TBD               Chart Reviewed. Noted patient admitted to the hospital for further medical management. Care Management to follow up with patient and/or family for transition of care needs prn.

## 2018-10-28 NOTE — ROUTINE PROCESS
TRANSFER - OUT REPORT:    Verbal report given to Pre op holding nurse (name) on Gene Jensen  being transferred to OR (unit) for ordered procedure       Report consisted of patients Situation, Background, Assessment and   Recommendations(SBAR). Information from the following report(s) SBAR, Kardex, Intake/Output and MAR was reviewed with the receiving nurse. Lines:   Peripheral IV Left Antecubital (Active)   Site Assessment Clean, dry, & intact 10/28/2018 12:17 PM   Phlebitis Assessment 0 10/28/2018 12:17 PM   Infiltration Assessment 0 10/28/2018 12:17 PM   Dressing Status Clean, dry, & intact 10/28/2018 12:17 PM   Dressing Type Tape;Transparent 10/28/2018 12:17 PM   Hub Color/Line Status Pink; Infusing 10/28/2018 12:17 PM        Opportunity for questions and clarification was provided.       Patient transported with:   Patient-specific medications from Pharmacy

## 2018-10-28 NOTE — ANESTHESIA PREPROCEDURE EVALUATION
Anesthetic History PONV Review of Systems / Medical History Patient summary reviewed, nursing notes reviewed and pertinent labs reviewed Pulmonary Within defined limits Neuro/Psych Within defined limits Cardiovascular Within defined limits Exercise tolerance: >4 METS 
  
GI/Hepatic/Renal 
  
GERD Endo/Other Morbid obesity Other Findings Physical Exam 
 
Airway Mallampati: II 
TM Distance: 4 - 6 cm Neck ROM: normal range of motion Mouth opening: Diminished (comment) Cardiovascular Regular rate and rhythm,  S1 and S2 normal,  no murmur, click, rub, or gallop Rhythm: regular Rate: normal 
 
 
 
 Dental 
No notable dental hx Pulmonary Breath sounds clear to auscultation Abdominal 
GI exam deferred Other Findings Anesthetic Plan ASA: 2, emergent Anesthesia type: general 
 
 
 
 
Induction: RSI Anesthetic plan and risks discussed with: Patient

## 2018-10-28 NOTE — PROGRESS NOTES
Patient new transfer from [facility] this morning, was in surgery at first visit attempt by CM, CM witnessed patient's discharge prior to discharge planning interview.

## 2018-10-28 NOTE — PROGRESS NOTES
Patient had a general laparoscopic procedure by Dr. Eneida Lazar in am.  
Patient has 4 lap sites. Returned to floor drowsy but easily arouseable with stable vitals signs (taken every hour for 4 hours). Respirations periodically in the 50's. Lungs clear. Family present at bedside. Family made aware that patient can d/c when clear instructions from Dr. Eneida Lazar given and first 4 hour vitals remain stable. Visit Vitals /76 Pulse (!) 54 Temp 98.3 °F (36.8 °C) Resp 16 SpO2 100% Breastfeeding?  No

## 2018-10-28 NOTE — PROGRESS NOTES
Problem: Falls - Risk of  Goal: *Absence of Falls  Document Delores Fall Risk and appropriate interventions in the flowsheet.   Outcome: Progressing Towards Goal  Fall Risk Interventions:            Medication Interventions: Evaluate medications/consider consulting pharmacy, Teach patient to arise slowly

## 2018-10-28 NOTE — PERIOP NOTES
Pt is transferred to room 325 via bed. Alert and oriented, denies pain, dressing CDI, stable. Marleny RN at bedside, skin assessment completed, all questions answered. Blood pressure 120/77, pulse (!) 55, temperature 97.7 °F (36.5 °C), resp. rate 20, SpO2 100 %, not currently breastfeeding.

## 2018-10-29 NOTE — OP NOTES
[facility] [place]  OPERATIVE REPORT    Soumya Ohara  MR#: [id number]  : 1980  ACCOUNT #: [de-identified]   DATE OF SERVICE: 10/28/2018    PREOPERATIVE DIAGNOSIS:  Partial small-bowel obstruction due to suspected internal hernia. POSTOPERATIVE DIAGNOSES:  Partial small-bowel obstruction due to suspected internal hernia with adhesive band due to prior pelvic surgery, likely  section. PROCEDURES PERFORMED    1. Diagnostic laparoscopy. 2.  Laparoscopic lysis of adhesions. SURGEON:  Sharad Huitron MD      ASSISTANT:  Mary Tovar PA-C. CHIKIS Gage assisted with camera holding, assistance with exposure, assistance with lysis of adhesions, and closure of skin incisions. ANESTHESIA:  General endotracheal.      COMPLICATIONS:  None. ESTIMATED BLOOD LOSS:  None. SPECIMENS REMOVED:  None. IMPLANTS:  None. FINDINGS:  The patient was noted to have a dense band adhesion to her uterus, which was likely the source of the partial obstruction. She had some additional adhesive bands to the anterior abdominal wall, which were likely not the source of obstruction. All were taken down laparoscopically. STATEMENT OF MEDICAL NECESSITY:  The patient is a patient of mine who is 6 months out for laparoscopic gastric bypass. She has done exceptionally well with the weight loss with almost complete weight loss at her 6-month time period. She presented to the emergency room in [facility] yesterday with abdominal pain. She was evaluated. 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   The surgeon there in [place] did not wish to take on her case as she was fearful that it might be related to her gastric bypass, although he is a laparoscopic trained bariatric surgeon. He transferred the patient to my care here at [facility].  I saw her, and she had a very benign exam, but with CT findings of a possible internal hernia, I recommend we go ahead and explore today. OPERATIVE PROCEDURE:  The patient was brought to the operating room and placed on the table in supine position at which time general anesthesia was administered without any difficulty. Her abdomen was then prepped and draped in usual sterile fashion. Using a 15 blade, a 1 cm incision was made just to the left of the umbilicus. Veress needle approach was used to gain access to the peritoneal cavity which was then insufflated. The Visiport was then placed at that site, then two 5 mm trocars placed in the right upper quadrant region and 1 in the left upper quadrant region. Upon entering the abdomen, initially I thought that she did have an internal hernia as there was some abnormality of her mid small bowel. I located the terminal ileum, and I began the march in a retrograde fashion, and it was clear that the bowel was rotating around what appeared to be a band adhesion deep within the pelvis. I was able to reduce the bowel to its normal position. I then was able to visualize the adhesion in the pelvis and I took it down using the hook Bovie cautery off of the uterus itself. There were some additional adhesions to the anterior abdominal wall, which appeared to be not involved with this process, but I did take these down and reflected the omentum laterally. I then checked her gastric bypass anatomy.   Her Adelfo limb was completely normal, her efferent limb was normal, and I traced out the efferent limb all the way up to the terminal ileum, and it was normal.  At this juncture I was confident that that single band adhesion was the main cause of her partial obstruction, and we went ahead and terminated the procedure, removing all trocars, closing the skin incisions using 4-0 subcuticular Monocryl. Steri-Strips and dry dressings were applied. The patient tolerated the procedure well.       Zeinab WELLS / Paula.Keshav  D: 10/28/2018 12:24     T: 10/29/2018 05:22  JOB #: [id number]

## 2018-10-31 NOTE — DISCHARGE SUMMARY
Discharge Summary    Patient: Dorcas Miller               Sex: female          DOA: 10/28/2018         YOB: 1980      Age:  43 y.o.        LOS:  LOS: 1 day                Admit Date: 10/28/2018    Discharge Date: 10/28/2018    Admission Diagnoses: Abdominal Pain  Internal hernia    Discharge Diagnoses:    Problem List as of 10/28/2018 Date Reviewed: 10/16/2018          Codes Class Noted - Resolved    Internal hernia ICD-10-CM: K45.8  ICD-9-CM: 553.8  10/28/2018 - Present        Iron deficiency ICD-10-CM: E61.1  ICD-9-CM: 280.9  10/16/2018 - Present        Pica ICD-10-CM: F50.89  ICD-9-CM: 307.52  10/16/2018 - Present        Intertriginous candidiasis ICD-10-CM: B37.2  ICD-9-CM: 112.3  10/16/2018 - Present        Hypokalemia ICD-10-CM: E87.6  ICD-9-CM: 276.8  6/6/2018 - Present        Hypomagnesemia ICD-10-CM: E83.42  ICD-9-CM: 275.2  6/6/2018 - Present        Intestinal malabsorption ICD-10-CM: K90.9  ICD-9-CM: 579.9  4/19/2018 - Present        S/P gastric bypass ICD-10-CM: Z98.84  ICD-9-CM: V45.86  4/19/2018 - Present        Morbid obesity ([facility] ICD-10-CM: E66.01  ICD-9-CM: 278.01  Unknown - Present        Constipation ICD-10-CM: K59.00  ICD-9-CM: 564.00  Unknown - Present        History of colon polyps ICD-10-CM: Z86.010  ICD-9-CM: V12.72  Unknown - Present        RESOLVED: Nausea & vomiting ICD-10-CM: R11.2  ICD-9-CM: 787.01  6/6/2018 - 10/16/2018        RESOLVED: Dysphagia ICD-10-CM: R13.10  ICD-9-CM: 787.20  6/6/2018 - 10/16/2018        RESOLVED: Morbid obesity with BMI of 45.0-49.9, adult ([address] ICD-10-CM: E66.01, Z68.42  ICD-9-CM: 278.01, V85.42  4/5/2018 - 8/2/2018        RESOLVED: Morbid obesity with body mass index (BMI) of 40.0 to 49.9 ([facility] ICD-10-CM: E66.01  ICD-9-CM: 278.01  Unknown - 8/2/2018        RESOLVED: Functional dyspepsia ICD-10-CM: K30  ICD-9-CM: 536.8  Unknown - 10/16/2018    Overview Signed 11/6/2017 11:15 AM by CHIKIS Costa     uses OTC meds Discharge Condition: Good    Hospital Course: The patient underwent an uncomplicated gastric bypass in April of 2018. She was seen in [facility] ER just prior to this admission for abdominal pain. A CT was obtained that showed a small bowel obstruction. She was transferred to THE [facility] under Dr. Niurka Cox service for surgical correction. She was transferred to THE [facility] and underwent the following operation with the following reasons;     PROCEDURES PERFORMED    1. Diagnostic laparoscopy. 2.  Laparoscopic lysis of adhesions.       SURGEON:  Kenneth Skiff, MD       ASSISTANT:  Lennox Libra, PA-C. PA Mevelyn Hero assisted with camera holding, assistance with exposure, assistance with lysis of adhesions, and closure of skin incisions.         ANESTHESIA:  General endotracheal.       COMPLICATIONS:  None.       ESTIMATED BLOOD LOSS:  None.       SPECIMENS REMOVED:  None.       IMPLANTS:  None.       FINDINGS:  The patient was noted to have a dense band adhesion to her uterus, which was likely the source of the partial obstruction. She had some additional adhesive bands to the anterior abdominal wall, which were likely not the source of obstruction. All were taken down laparoscopically. She did well with her surgery and was discharged home after a short stay in PACU in stable condition with PO pain medication. Significant Diagnostic Studies:   Recent Labs     10/28/18  1011   HGB 10.6*     Recent Labs     10/28/18  1011   HCT 32.6*       Discharge Medication List as of 10/28/2018  3:39 PM      START taking these medications    Details   oxyCODONE-acetaminophen (PERCOCET) 5-325 mg per tablet Take 1 Tab by mouth every four (4) hours as needed for Pain. Max Daily Amount: 6 Tabs., Print, Disp-25 Tab, R-0         CONTINUE these medications which have NOT CHANGED    Details   cholecalciferol, vitamin D3, (VITAMIN D3 PO) Take 1 Tab by mouth daily. , Historical Med      nystatin (MYCOSTATIN) topical cream Apply  to affected area two (2) times a day., Print, Disp-30 g, R-3      multivit-minerals/folic acid (ADULT MULTIVITAMIN GUMMIES PO) Take  by mouth., Historical Med      calcium citrate 200 mg (950 mg) tablet Take 200 mg by mouth two (2) times a day., Historical Med      polyethylene glycol (MIRALAX) 17 gram/dose powder Take 17 g by mouth daily. , Print, Disp-1700 g, R-3             Activity: activity as tolerated with no heavy lifting of greater than 20 pounds. No anti- inflammatory medications. Use stool softeners at home as needed while taking pain medications since they are constipating. Diet: as tolerated    Wound Care: Keep wound clean and dry, Reinforce dressing PRN and ice to area for comfort. Do not get wound wet for 2 days.     Follow-up: 14 days with Dr Jay Gonzalez M.D

## 2018-11-07 ENCOUNTER — OFFICE VISIT (OUTPATIENT)
Dept: SURGERY | Age: 38
End: 2018-11-07

## 2018-11-07 VITALS
WEIGHT: 151.4 LBS | BODY MASS INDEX: 31.78 KG/M2 | HEIGHT: 58 IN | HEART RATE: 70 BPM | OXYGEN SATURATION: 100 % | SYSTOLIC BLOOD PRESSURE: 113 MMHG | RESPIRATION RATE: 16 BRPM | TEMPERATURE: 98 F | DIASTOLIC BLOOD PRESSURE: 58 MMHG

## 2018-11-07 DIAGNOSIS — Z98.84 S/P GASTRIC BYPASS: ICD-10-CM

## 2018-11-07 DIAGNOSIS — K90.9 INTESTINAL MALABSORPTION, UNSPECIFIED TYPE: ICD-10-CM

## 2018-11-07 DIAGNOSIS — K56.609 SMALL BOWEL OBSTRUCTION (HCC): Primary | ICD-10-CM

## 2018-11-07 NOTE — LETTER
NOTIFICATION OF RETURN TO WORK / SCHOOL 
11/7/2018 Ms. Mckenzie Bermeo [address] [facility] Guest [phone] To Whom It May Concern: 
 
Mckenzie Bermeo was under the care of [facility]. She will return to work on 11/12/2018 with no restrictions. If there are questions or concerns please have the patient contact our office. Sincerely, Chalo Hathaway MD

## 2018-11-07 NOTE — PROGRESS NOTES
7 months follow-up bypass (10 days post SBO surgery) Subjective:  
 
Eros Borrego  is a 43 y.o. female who presents for follow-up about 7 months following laparoscopic gastric bypass surgery. She has lost a total of 79 pounds since surgery. Body mass index is 31.64 kg/m². . EBWL is (70%). The patient presents today to assess their progress toward their goal of weight loss and to address any issues that may be present. Today the patient and I have reviewed their diet and how appropriate their food choices are. The following issues have been identified  - pt here for \"2 week\" check of her lap'scope for adhesive band SBO (picked up at [facility]). Pt still C/O being \"tired\" - pt found to be anemic with low Fe studies at her 6 month. Having issues getting Fe infusions via [facility] due to insurance issues. Pain assessment - 0/10 [facility] Surgery related complication: NA - did have SBO at 6 months lillie She reports symptoms of anemia and denies vomiting, abdominal pain, diarrhea and difficulty breathing. The patient's exercise level: moderately active. Changes in her medical history and medications have been reviewed. Patient Active Problem List  
Diagnosis Code  Morbid obesity (HCC) E66.01  
 Constipation K59.00  
 History of colon polyps Z86.010  
 Intestinal malabsorption K90.9  S/P gastric bypass Z98.84  
 Hypokalemia E87.6  Hypomagnesemia E83.42  
 Iron deficiency E61.1  Pica F50.89  Intertriginous candidiasis B37.2  Internal hernia K45.8 Past Medical History:  
Diagnosis Date  Constipation  Functional dyspepsia   
 uses OTC meds  GERD (gastroesophageal reflux disease)  History of colon polyps  Morbid obesity ([address]  Morbid obesity with body mass index (BMI) of 40.0 to 49.9 ([address]  Nausea & vomiting 2018 Past Surgical History:  
Procedure Laterality Date  COLONOSCOPY  X 3  
 DELIVERY     
 X 3  
  HX DILATION AND CURETTAGE  2005  HX OTHER SURGICAL    
 scar resection back of head  HX TUBAL LIGATION    
 HX UROLOGICAL  2001  
 stent [address] Current Outpatient Medications Medication Sig Dispense Refill  nystatin (MYCOSTATIN) topical cream Apply  to affected area two (2) times a day. 30 g 3  
 multivit-minerals/folic acid (ADULT MULTIVITAMIN GUMMIES PO) Take  by mouth.  calcium citrate 200 mg (950 mg) tablet Take 200 mg by mouth two (2) times a day.  polyethylene glycol (MIRALAX) 17 gram/dose powder Take 17 g by mouth daily. 1700 g 3  cholecalciferol, vitamin D3, (VITAMIN D3 PO) Take 1 Tab by mouth daily.  oxyCODONE-acetaminophen (PERCOCET) 5-325 mg per tablet Take 1 Tab by mouth every four (4) hours as needed for Pain. Max Daily Amount: 6 Tabs. 25 Tab 0 Review of Symptoms:  
 
General - No history or complaints of unexpected fever or chills Head/Neck - No history or complaints of headache or dizziness Cardiac - No history or complaints of chest pain, palpitations, or shortness of breath Pulmonary - No history or complaints of shortness of breath or productive cough Gastrointestinal - as noted above Genitourinary - No history or complaints of hematuria/dysuria or renal lithiasis Musculoskeletal - No history or complaints of joint  muscular weakness Hematologic - No history of any bleeding episodes Neurologic - No history or complaints of  migraine headaches or neurologic symptoms Objective:  
 
Visit Vitals /58 (BP 1 Location: Left arm, BP Patient Position: Sitting) Pulse 70 Temp 98 °F (36.7 °C) Resp 16 Ht 4' 10\" (1.473 m) Wt 68.7 kg (151 lb 6.4 oz) SpO2 100% BMI 31.64 kg/m² Physical Exam: 
 
General:  alert, cooperative, no distress, appears stated age Lungs:   clear to auscultation bilaterally Heart:  Regular rate and rhythm Abdomen:   abdomen is soft without significant tenderness, masses, organomegaly or guarding; Incisions: healing well, no significant drainage Labs:  
 
Recent Results (from the past 2016 hour(s)) VITAMIN D, 25 HYDROXY Collection Time: 09/05/18  5:30 PM  
Result Value Ref Range Vitamin D 25-Hydroxy 35.7 30 - 100 ng/mL VITAMIN B12 & FOLATE Collection Time: 09/05/18  5:30 PM  
Result Value Ref Range Vitamin B12 1,927 (H) 211 - 911 pg/mL Folate >20.0 (H) 3.10 - 17.50 ng/mL FERRITIN Collection Time: 09/05/18  5:30 PM  
Result Value Ref Range Ferritin 23 8 - 388 NG/ML  
IRON Collection Time: 09/05/18  5:30 PM  
Result Value Ref Range Iron 45 (L) 50 - 175 ug/dL METABOLIC PANEL, COMPREHENSIVE Collection Time: 09/05/18  5:30 PM  
Result Value Ref Range Sodium 139 136 - 145 mmol/L Potassium 3.5 3.5 - 5.5 mmol/L Chloride 106 100 - 108 mmol/L  
 CO2 23 21 - 32 mmol/L Anion gap 10 3.0 - 18 mmol/L Glucose 85 74 - 99 mg/dL BUN 14 7.0 - 18 MG/DL Creatinine 0.63 0.6 - 1.3 MG/DL  
 BUN/Creatinine ratio 22 (H) 12 - 20 GFR est AA >60 >60 ml/min/1.73m2 GFR est non-AA >60 >60 ml/min/1.73m2 Calcium 8.9 8.5 - 10.1 MG/DL Bilirubin, total 0.4 0.2 - 1.0 MG/DL  
 ALT (SGPT) 27 13 - 56 U/L  
 AST (SGOT) 22 15 - 37 U/L Alk. phosphatase 65 45 - 117 U/L Protein, total 7.6 6.4 - 8.2 g/dL Albumin 3.8 3.4 - 5.0 g/dL Globulin 3.8 2.0 - 4.0 g/dL A-G Ratio 1.0 0.8 - 1.7 VITAMIN B1, WHOLE BLOOD Collection Time: 09/05/18  5:30 PM  
Result Value Ref Range Vitamin B1 57.7 (L) 66.5 - 200.0 nmol/L  
VITAMIN B12 & FOLATE Collection Time: 10/12/18  6:38 PM  
Result Value Ref Range Vitamin B12 >2,000 (H) 211 - 911 pg/mL Folate >20.0 (H) 3.10 - 17.50 ng/mL CBC WITH AUTOMATED DIFF Collection Time: 10/12/18  6:38 PM  
Result Value Ref Range WBC 3.6 (L) 4.6 - 13.2 K/uL  
 RBC 3.85 (L) 4.20 - 5.30 M/uL  
 HGB 11.5 (L) 12.0 - 16.0 g/dL HCT 35.3 35.0 - 45.0 %  MCV 91.7 74.0 - 97.0 FL  
 MCH 29.9 24.0 - 34.0 PG  
 MCHC 32.6 31.0 - 37.0 g/dL  
 RDW 13.3 11.6 - 14.5 % PLATELET 862 360 - 927 K/uL MPV 9.8 9.2 - 11.8 FL  
 NEUTROPHILS 39 (L) 40 - 73 % LYMPHOCYTES 52 21 - 52 % MONOCYTES 6 3 - 10 % EOSINOPHILS 3 0 - 5 % BASOPHILS 0 0 - 2 %  
 ABS. NEUTROPHILS 1.4 (L) 1.8 - 8.0 K/UL  
 ABS. LYMPHOCYTES 1.9 0.9 - 3.6 K/UL  
 ABS. MONOCYTES 0.2 0.05 - 1.2 K/UL  
 ABS. EOSINOPHILS 0.1 0.0 - 0.4 K/UL  
 ABS. BASOPHILS 0.0 0.0 - 0.1 K/UL  
 DF AUTOMATED FERRITIN Collection Time: 10/12/18  6:38 PM  
Result Value Ref Range Ferritin 15 8 - 388 NG/ML  
IRON Collection Time: 10/12/18  6:38 PM  
Result Value Ref Range Iron 55 50 - 175 ug/dL METABOLIC PANEL, COMPREHENSIVE Collection Time: 10/12/18  6:38 PM  
Result Value Ref Range Sodium 141 136 - 145 mmol/L Potassium 3.2 (L) 3.5 - 5.5 mmol/L Chloride 103 100 - 108 mmol/L  
 CO2 27 21 - 32 mmol/L Anion gap 11 3.0 - 18 mmol/L Glucose 95 74 - 99 mg/dL BUN 14 7.0 - 18 MG/DL Creatinine 0.63 0.6 - 1.3 MG/DL  
 BUN/Creatinine ratio 22 (H) 12 - 20 GFR est AA >60 >60 ml/min/1.73m2 GFR est non-AA >60 >60 ml/min/1.73m2 Calcium 9.1 8.5 - 10.1 MG/DL Bilirubin, total 0.4 0.2 - 1.0 MG/DL  
 ALT (SGPT) 27 13 - 56 U/L  
 AST (SGOT) 21 15 - 37 U/L Alk. phosphatase 63 45 - 117 U/L Protein, total 7.4 6.4 - 8.2 g/dL Albumin 3.9 3.4 - 5.0 g/dL Globulin 3.5 2.0 - 4.0 g/dL A-G Ratio 1.1 0.8 - 1.7 VITAMIN B1, WHOLE BLOOD Collection Time: 10/12/18  6:38 PM  
Result Value Ref Range Vitamin B1 59.9 (L) 66.5 - 200.0 nmol/L  
VITAMIN D, 25 HYDROXY Collection Time: 10/12/18  6:41 PM  
Result Value Ref Range Vitamin D 25-Hydroxy 31.8 30 - 100 ng/mL HCG QL SERUM Collection Time: 10/28/18  9:40 AM  
Result Value Ref Range HCG, Ql. NEGATIVE  NEG    
CBC W/O DIFF Collection Time: 10/28/18 10:11 AM  
Result Value Ref Range WBC 3.8 (L) 4.6 - 13.2 K/uL  
 RBC 3.56 (L) 4.20 - 5.30 M/uL HGB 10.6 (L) 12.0 - 16.0 g/dL HCT 32.6 (L) 35.0 - 45.0 % MCV 91.6 74.0 - 97.0 FL  
 MCH 29.8 24.0 - 34.0 PG  
 MCHC 32.5 31.0 - 37.0 g/dL  
 RDW 13.5 11.6 - 14.5 % PLATELET 732 730 - 353 K/uL MPV 10.0 9.2 - 69.0 FL  
METABOLIC PANEL, BASIC Collection Time: 10/28/18 10:11 AM  
Result Value Ref Range Sodium 142 136 - 145 mmol/L Potassium 3.7 3.5 - 5.5 mmol/L Chloride 109 (H) 100 - 108 mmol/L  
 CO2 23 21 - 32 mmol/L Anion gap 10 3.0 - 18 mmol/L Glucose 76 74 - 99 mg/dL BUN 9 7.0 - 18 MG/DL Creatinine 0.65 0.6 - 1.3 MG/DL  
 BUN/Creatinine ratio 14 GFR est AA >60 >60 ml/min/1.73m2 GFR est non-AA >60 >60 ml/min/1.73m2 Calcium 8.8 8.5 - 10.1 MG/DL Assessment:  
 
1. History of Morbid obesity, status post  laparoscopic gastric bypass surgery. The patient has a known history of anemia with low Fe studies. She has a PCP referral to [facility] for infusion but [facility] is slow to get her in for treatment. She will cont to work on low carb and high protein in hopes of getting her BMI to 27. She is having no issues with PO intake and has an upcoming coloscopy for history of colon polyps. Jovita Copper Plan: 1. Remember to measure portions, continue low carbohydrate diet 2. Lab reviewed with patient and appropriate changes were made to vitamin regimen. 3. Remember vitamin supplements - The importance of such was discussed regarding the malabsorptive issues that the surgery creates 4. Exercise regimen appears adequate. 5. Attend support group 6. Follow-up in 3 month(s). 7. Total time spent with the patient 30 minutes. 8. The patient understands the plan of action

## 2018-11-07 NOTE — PATIENT INSTRUCTIONS
Body Mass Index: Care Instructions Your Care Instructions Body mass index (BMI) can help you see if your weight is raising your risk for health problems. It uses a formula to compare how much you weigh with how tall you are. · A BMI lower than 18.5 is considered underweight. · A BMI between 18.5 and 24.9 is considered healthy. · A BMI between 25 and 29.9 is considered overweight. A BMI of 30 or higher is considered obese. If your BMI is in the normal range, it means that you have a lower risk for weight-related health problems. If your BMI is in the overweight or obese range, you may be at increased risk for weight-related health problems, such as high blood pressure, heart disease, stroke, arthritis or joint pain, and diabetes. If your BMI is in the underweight range, you may be at increased risk for health problems such as fatigue, lower protection (immunity) against illness, muscle loss, bone loss, hair loss, and hormone problems. BMI is just one measure of your risk for weight-related health problems. You may be at higher risk for health problems if you are not active, you eat an unhealthy diet, or you drink too much alcohol or use tobacco products. Follow-up care is a key part of your treatment and safety. Be sure to make and go to all appointments, and call your doctor if you are having problems. It's also a good idea to know your test results and keep a list of the medicines you take. How can you care for yourself at home? · Practice healthy eating habits. This includes eating plenty of fruits, vegetables, whole grains, lean protein, and low-fat dairy. · If your doctor recommends it, get more exercise. Walking is a good choice. Bit by bit, increase the amount you walk every day. Try for at least 30 minutes on most days of the week. · Do not smoke. Smoking can increase your risk for health problems.  If you need help quitting, talk to your doctor about stop-smoking programs and medicines. These can increase your chances of quitting for good. · Limit alcohol to 2 drinks a day for men and 1 drink a day for women. Too much alcohol can cause health problems. If you have a BMI higher than 25 · Your doctor may do other tests to check your risk for weight-related health problems. This may include measuring the distance around your waist. A waist measurement of more than 40 inches in men or 35 inches in women can increase the risk of weight-related health problems. · Talk with your doctor about steps you can take to stay healthy or improve your health. You may need to make lifestyle changes to lose weight and stay healthy, such as changing your diet and getting regular exercise. If you have a BMI lower than 18.5 · Your doctor may do other tests to check your risk for health problems. · Talk with your doctor about steps you can take to stay healthy or improve your health. You may need to make lifestyle changes to gain or maintain weight and stay healthy, such as getting more healthy foods in your diet and doing exercises to build muscle. Where can you learn more? Go to http://jorge-prisca.info/. Enter S176 in the search box to learn more about \"Body Mass Index: Care Instructions. \" Current as of: June 26, 2018 Content Version: 11.8 © 2364-6851 [employer]. Care instructions adapted under license by [employer] (which disclaims liability or warranty for this information). If you have questions about a medical condition or this instruction, always ask your healthcare professional. [address] any warranty or liability for your use of this information.

## 2018-12-06 ENCOUNTER — HOSPITAL ENCOUNTER (EMERGENCY)
Age: 38
Discharge: HOME | End: 2018-12-06
Attending: EMERGENCY MEDICINE
Payer: OTHER GOVERNMENT

## 2018-12-06 ENCOUNTER — DOCUMENTATION ONLY (OUTPATIENT)
Dept: SURGERY | Age: 38
End: 2018-12-06

## 2018-12-06 ENCOUNTER — APPOINTMENT (OUTPATIENT)
Dept: ULTRASOUND IMAGING | Age: 38
End: 2018-12-06
Attending: PHYSICIAN ASSISTANT
Payer: OTHER GOVERNMENT

## 2018-12-06 ENCOUNTER — APPOINTMENT (OUTPATIENT)
Dept: GENERAL RADIOLOGY | Age: 38
End: 2018-12-06
Attending: PHYSICIAN ASSISTANT
Payer: OTHER GOVERNMENT

## 2018-12-06 VITALS
DIASTOLIC BLOOD PRESSURE: 67 MMHG | OXYGEN SATURATION: 100 % | SYSTOLIC BLOOD PRESSURE: 121 MMHG | TEMPERATURE: 98.4 F | HEART RATE: 73 BPM | BODY MASS INDEX: 30.54 KG/M2 | RESPIRATION RATE: 14 BRPM | HEIGHT: 58 IN | WEIGHT: 145.5 LBS

## 2018-12-06 DIAGNOSIS — R10.10 UPPER ABDOMINAL PAIN: Primary | ICD-10-CM

## 2018-12-06 DIAGNOSIS — Z98.84 HISTORY OF BARIATRIC SURGERY: ICD-10-CM

## 2018-12-06 LAB
ALBUMIN SERPL-MCNC: 3.8 G/DL (ref 3.4–5)
ALBUMIN/GLOB SERPL: 1 {RATIO} (ref 0.8–1.7)
ALP SERPL-CCNC: 74 U/L (ref 45–117)
ALT SERPL-CCNC: 26 U/L (ref 13–56)
ANION GAP SERPL CALC-SCNC: 4 MMOL/L (ref 3–18)
APPEARANCE UR: CLEAR
AST SERPL-CCNC: 30 U/L (ref 15–37)
BACTERIA URNS QL MICRO: ABNORMAL /HPF
BASOPHILS # BLD: 0 K/UL (ref 0–0.1)
BASOPHILS NFR BLD: 0 % (ref 0–2)
BILIRUB SERPL-MCNC: 0.5 MG/DL (ref 0.2–1)
BILIRUB UR QL: NEGATIVE
BUN SERPL-MCNC: 12 MG/DL (ref 7–18)
BUN/CREAT SERPL: 21
CALCIUM SERPL-MCNC: 8.9 MG/DL (ref 8.5–10.1)
CHLORIDE SERPL-SCNC: 107 MMOL/L (ref 100–108)
CO2 SERPL-SCNC: 28 MMOL/L (ref 21–32)
COLOR UR: ABNORMAL
CREAT SERPL-MCNC: 0.58 MG/DL (ref 0.6–1.3)
DIFFERENTIAL METHOD BLD: ABNORMAL
EOSINOPHIL # BLD: 0.1 K/UL (ref 0–0.4)
EOSINOPHIL NFR BLD: 3 % (ref 0–5)
EPITH CASTS URNS QL MICRO: ABNORMAL /LPF (ref 0–5)
ERYTHROCYTE [DISTWIDTH] IN BLOOD BY AUTOMATED COUNT: 13.5 % (ref 11.6–14.5)
GLOBULIN SER CALC-MCNC: 3.8 G/DL (ref 2–4)
GLUCOSE SERPL-MCNC: 81 MG/DL (ref 74–99)
GLUCOSE UR STRIP.AUTO-MCNC: NEGATIVE MG/DL
HCG UR QL: NEGATIVE
HCT VFR BLD AUTO: 34.4 % (ref 35–45)
HGB BLD-MCNC: 11.1 G/DL (ref 12–16)
HGB UR QL STRIP: ABNORMAL
KETONES UR QL STRIP.AUTO: ABNORMAL MG/DL
LEUKOCYTE ESTERASE UR QL STRIP.AUTO: NEGATIVE
LIPASE SERPL-CCNC: 145 U/L (ref 73–393)
LYMPHOCYTES # BLD: 1.6 K/UL (ref 0.9–3.6)
LYMPHOCYTES NFR BLD: 41 % (ref 21–52)
MCH RBC QN AUTO: 29.6 PG (ref 24–34)
MCHC RBC AUTO-ENTMCNC: 32.3 G/DL (ref 31–37)
MCV RBC AUTO: 91.7 FL (ref 74–97)
MONOCYTES # BLD: 0.3 K/UL (ref 0.05–1.2)
MONOCYTES NFR BLD: 9 % (ref 3–10)
MUCOUS THREADS URNS QL MICRO: ABNORMAL /LPF
NEUTS SEG # BLD: 1.8 K/UL (ref 1.8–8)
NEUTS SEG NFR BLD: 47 % (ref 40–73)
NITRITE UR QL STRIP.AUTO: NEGATIVE
PH UR STRIP: 6.5 [PH] (ref 5–8)
PLATELET # BLD AUTO: 197 K/UL (ref 135–420)
PMV BLD AUTO: 9.9 FL (ref 9.2–11.8)
POTASSIUM SERPL-SCNC: 4.2 MMOL/L (ref 3.5–5.5)
PROT SERPL-MCNC: 7.6 G/DL (ref 6.4–8.2)
PROT UR STRIP-MCNC: NEGATIVE MG/DL
RBC # BLD AUTO: 3.75 M/UL (ref 4.2–5.3)
RBC #/AREA URNS HPF: ABNORMAL /HPF (ref 0–5)
SODIUM SERPL-SCNC: 139 MMOL/L (ref 136–145)
SP GR UR REFRACTOMETRY: >1.03 (ref 1–1.03)
UROBILINOGEN UR QL STRIP.AUTO: 2 EU/DL (ref 0.2–1)
WBC # BLD AUTO: 3.9 K/UL (ref 4.6–13.2)
WBC URNS QL MICRO: ABNORMAL /HPF (ref 0–5)

## 2018-12-06 PROCEDURE — 81025 URINE PREGNANCY TEST: CPT

## 2018-12-06 PROCEDURE — 74022 RADEX COMPL AQT ABD SERIES: CPT

## 2018-12-06 PROCEDURE — 96375 TX/PRO/DX INJ NEW DRUG ADDON: CPT

## 2018-12-06 PROCEDURE — 83690 ASSAY OF LIPASE: CPT

## 2018-12-06 PROCEDURE — 85025 COMPLETE CBC W/AUTO DIFF WBC: CPT

## 2018-12-06 PROCEDURE — 80053 COMPREHEN METABOLIC PANEL: CPT

## 2018-12-06 PROCEDURE — 81001 URINALYSIS AUTO W/SCOPE: CPT

## 2018-12-06 PROCEDURE — 96374 THER/PROPH/DIAG INJ IV PUSH: CPT

## 2018-12-06 PROCEDURE — 74011250636 HC RX REV CODE- 250/636: Performed by: PHYSICIAN ASSISTANT

## 2018-12-06 PROCEDURE — C9113 INJ PANTOPRAZOLE SODIUM, VIA: HCPCS | Performed by: PHYSICIAN ASSISTANT

## 2018-12-06 PROCEDURE — 99283 EMERGENCY DEPT VISIT LOW MDM: CPT

## 2018-12-06 PROCEDURE — 76705 ECHO EXAM OF ABDOMEN: CPT

## 2018-12-06 RX ORDER — CYANOCOBALAMIN 1000 UG/ML
1000 INJECTION, SOLUTION INTRAMUSCULAR; SUBCUTANEOUS ONCE
COMMUNITY
End: 2019-04-17

## 2018-12-06 RX ORDER — PANTOPRAZOLE SODIUM 40 MG/10ML
40 INJECTION, POWDER, LYOPHILIZED, FOR SOLUTION INTRAVENOUS
Status: COMPLETED | OUTPATIENT
Start: 2018-12-06 | End: 2018-12-06

## 2018-12-06 RX ORDER — FAMOTIDINE 40 MG/1
40 TABLET, FILM COATED ORAL DAILY
Qty: 30 TAB | Refills: 0 | Status: SHIPPED | OUTPATIENT
Start: 2018-12-06 | End: 2019-04-17

## 2018-12-06 RX ORDER — ONDANSETRON 2 MG/ML
4 INJECTION INTRAMUSCULAR; INTRAVENOUS
Status: COMPLETED | OUTPATIENT
Start: 2018-12-06 | End: 2018-12-06

## 2018-12-06 RX ORDER — GLUCOSAMINE/CHONDR SU A SOD 750-600 MG
TABLET ORAL
COMMUNITY
End: 2020-01-21

## 2018-12-06 RX ORDER — DICYCLOMINE HYDROCHLORIDE 20 MG/1
20 TABLET ORAL EVERY 6 HOURS
Qty: 20 TAB | Refills: 0 | Status: SHIPPED | OUTPATIENT
Start: 2018-12-06 | End: 2018-12-11

## 2018-12-06 RX ADMIN — ONDANSETRON HYDROCHLORIDE 4 MG: 2 INJECTION INTRAMUSCULAR; INTRAVENOUS at 15:34

## 2018-12-06 RX ADMIN — SODIUM CHLORIDE 1000 ML: 900 INJECTION, SOLUTION INTRAVENOUS at 15:59

## 2018-12-06 RX ADMIN — PANTOPRAZOLE SODIUM 40 MG: 40 INJECTION, POWDER, FOR SOLUTION INTRAVENOUS at 15:34

## 2018-12-06 NOTE — ED NOTES
Nurse entered roomed to discuss discharge. Patient stating she wants a \"doctor\" to see her for \" a second opinion\". Patient requesting abdominal ultrasound. Provider Paula DIOP made aware.

## 2018-12-06 NOTE — PROGRESS NOTES
Dr. Dayna Johnson please be advised:    Due abdominal pain of level \"8\" dull pain. Feels same type of pain as prior to most recent procedure. Pain began last night. Normal temp 98 reported. Last night the pain was minimal,  this a.m. pain radiates up the mid right side of back from abdomen. Patient will present to ER here. She is calling her spouse to drive her. Advised patient if pain becomes too severe please present to the closest ER as she works on the [employer]. Patient verbalized understanding.

## 2018-12-06 NOTE — ED PROVIDER NOTES
EMERGENCY DEPARTMENT HISTORY AND PHYSICAL EXAM 
 
Date: 12/6/2018 Patient Name: Susana Seo History of Presenting Illness Chief Complaint Patient presents with  Abdominal Pain History Provided By: Patient Chief Complaint: abd pain Duration: 1 Days Timing:  Gradual 
Location: epigastric Quality: Aching Severity: Moderate Modifying Factors: gastric bypass HX Associated Symptoms: back pain and nausea Additional History (Context):  
4:11 PM 
Susana Seo is a 43 y.o. female with PMHX of gastric bypass 7 months ago by  who presents to the emergency department C/O upper abd pain radiating into back for 24 hours. Associated sxs include nausea. Pt reports similar pain in past which she was taken to surgery for some obstruction. Pt denies fever, vomiting, constipation, cough, CP, and any other sxs or complaints. LBM was this morning. PCP: Milton Black MD 
 
Current Outpatient Medications Medication Sig Dispense Refill  Biotin 2,500 mcg cap Take  by mouth.  cyanocobalamin (VITAMIN B12) 1,000 mcg/mL injection 1,000 mcg by IntraMUSCular route once.  famotidine (PEPCID) 40 mg tablet Take 1 Tab by mouth daily. 30 Tab 0  
 dicyclomine (BENTYL) 20 mg tablet Take 1 Tab by mouth every six (6) hours for 20 doses. 20 Tab 0  
 multivit-minerals/folic acid (ADULT MULTIVITAMIN GUMMIES PO) Take  by mouth.  calcium citrate 200 mg (950 mg) tablet Take 200 mg by mouth two (2) times a day.  polyethylene glycol (MIRALAX) 17 gram/dose powder Take 17 g by mouth daily. 1700 g 3 Past History Past Medical History: 
Past Medical History:  
Diagnosis Date  Constipation  Functional dyspepsia   
 uses OTC meds  GERD (gastroesophageal reflux disease)  History of colon polyps  Morbid obesity ([address]  Morbid obesity with body mass index (BMI) of 40.0 to 49.9 ([address]  Nausea & vomiting 6/6/2018  
 SBO (small bowel obstruction) (HCC) Past Surgical History: 
Past Surgical History:  
Procedure Laterality Date  COLONOSCOPY X 3  
 DELIVERY     
 X 3  
 HX DILATION AND CURETTAGE  2005  HX GI    
 gastric bypass  HX OTHER SURGICAL    
 scar resection back of head  HX TUBAL LIGATION    
 HX UROLOGICAL  2001  
 stent [address] Family History: 
Family History Problem Relation Age of Onset  Hypertension Mother  Obesity Mother  Hypertension Father Social History: 
Social History Tobacco Use  Smoking status: Never Smoker  Smokeless tobacco: Never Used Substance Use Topics  Alcohol use: Yes Comment: rare  Drug use: No  
 
 
Allergies: Allergies Allergen Reactions  Niacin Other (comments)  Morphine Hives and Itching Review of Systems Review of Systems Constitutional: Negative for fever. Respiratory: Negative for cough. Cardiovascular: Negative for chest pain. Gastrointestinal: Positive for abdominal pain and nausea. Negative for constipation and vomiting. Musculoskeletal: Positive for back pain. All other systems reviewed and are negative. Physical Exam  
 
Vitals:  
 18 1432 18 1854 BP: 111/70 121/67 Pulse: 60 73 Resp: 16 14 Temp: 98.4 °F (36.9 °C) SpO2: 100% 100% Weight: 66 kg (145 lb 8.1 oz) Height: 4' 10\" (1.473 m) Physical Exam  
Constitutional: She is oriented to person, place, and time. She appears well-developed and well-nourished. No distress. HENT:  
Head: Normocephalic and atraumatic. Eyes: Conjunctivae and EOM are normal. Pupils are equal, round, and reactive to light. Neck: Normal range of motion. Neck supple. Cardiovascular: Normal rate and regular rhythm. Pulmonary/Chest: Effort normal and breath sounds normal.  
Abdominal: Soft. Bowel sounds are normal. She exhibits no distension. There is tenderness.  There is no rebound, no guarding, no CVA tenderness, no tenderness at McBurney's point and negative Ureña's sign. Musculoskeletal: Normal range of motion. She exhibits no edema or tenderness. Neurological: She is alert and oriented to person, place, and time. Skin: Skin is warm and dry. Psychiatric: She has a normal mood and affect. Her behavior is normal.  
Nursing note and vitals reviewed. Diagnostic Study Results Labs - Recent Results (from the past 12 hour(s)) CBC WITH AUTOMATED DIFF Collection Time: 12/06/18  3:53 PM  
Result Value Ref Range WBC 3.9 (L) 4.6 - 13.2 K/uL  
 RBC 3.75 (L) 4.20 - 5.30 M/uL  
 HGB 11.1 (L) 12.0 - 16.0 g/dL HCT 34.4 (L) 35.0 - 45.0 % MCV 91.7 74.0 - 97.0 FL  
 MCH 29.6 24.0 - 34.0 PG  
 MCHC 32.3 31.0 - 37.0 g/dL  
 RDW 13.5 11.6 - 14.5 % PLATELET 776 706 - 031 K/uL MPV 9.9 9.2 - 11.8 FL  
 NEUTROPHILS 47 40 - 73 % LYMPHOCYTES 41 21 - 52 % MONOCYTES 9 3 - 10 % EOSINOPHILS 3 0 - 5 % BASOPHILS 0 0 - 2 %  
 ABS. NEUTROPHILS 1.8 1.8 - 8.0 K/UL  
 ABS. LYMPHOCYTES 1.6 0.9 - 3.6 K/UL  
 ABS. MONOCYTES 0.3 0.05 - 1.2 K/UL  
 ABS. EOSINOPHILS 0.1 0.0 - 0.4 K/UL  
 ABS. BASOPHILS 0.0 0.0 - 0.1 K/UL  
 DF AUTOMATED METABOLIC PANEL, COMPREHENSIVE Collection Time: 12/06/18  3:53 PM  
Result Value Ref Range Sodium 139 136 - 145 mmol/L Potassium 4.2 3.5 - 5.5 mmol/L Chloride 107 100 - 108 mmol/L  
 CO2 28 21 - 32 mmol/L Anion gap 4 3.0 - 18 mmol/L Glucose 81 74 - 99 mg/dL BUN 12 7.0 - 18 MG/DL Creatinine 0.58 (L) 0.6 - 1.3 MG/DL  
 BUN/Creatinine ratio 21 GFR est AA >60 >60 ml/min/1.73m2 GFR est non-AA >60 >60 ml/min/1.73m2 Calcium 8.9 8.5 - 10.1 MG/DL Bilirubin, total 0.5 0.2 - 1.0 MG/DL  
 ALT (SGPT) 26 13 - 56 U/L  
 AST (SGOT) 30 15 - 37 U/L Alk. phosphatase 74 45 - 117 U/L Protein, total 7.6 6.4 - 8.2 g/dL Albumin 3.8 3.4 - 5.0 g/dL Globulin 3.8 2.0 - 4.0 g/dL A-G Ratio 1.0 0.8 - 1.7 LIPASE Collection Time: 12/06/18  3:53 PM  
Result Value Ref Range Lipase 145 73 - 393 U/L  
URINALYSIS W/ RFLX MICROSCOPIC Collection Time: 12/06/18  3:53 PM  
Result Value Ref Range Color DARK YELLOW Appearance CLEAR Specific gravity >1.030 (H) 1.005 - 1.030  
 pH (UA) 6.5 5.0 - 8.0 Protein NEGATIVE  NEG mg/dL Glucose NEGATIVE  NEG mg/dL Ketone TRACE (A) NEG mg/dL Bilirubin NEGATIVE  NEG Blood SMALL (A) NEG Urobilinogen 2.0 (H) 0.2 - 1.0 EU/dL Nitrites NEGATIVE  NEG Leukocyte Esterase NEGATIVE  NEG    
URINE MICROSCOPIC ONLY Collection Time: 12/06/18  3:53 PM  
Result Value Ref Range WBC 0 to 2 0 - 5 /hpf  
 RBC 0 to 3 0 - 5 /hpf Epithelial cells FEW 0 - 5 /lpf Bacteria NONE SEEN NEG /hpf Mucus 1+ (A) NEG /lpf  
HCG URINE, QL. - POC Collection Time: 12/06/18  4:02 PM  
Result Value Ref Range Pregnancy test,urine (POC) NEGATIVE  NEG Radiologic Studies -  
XR ABD ACUTE W 1 V CHEST Final Result Impression:  
========== No acute findings. CT Results  (Last 48 hours) None CXR Results  (Last 48 hours) 12/06/18 1621  XR ABD ACUTE W 1 V CHEST Final result Impression:  Impression:  
========== No acute findings. Narrative:  ACUTE ABDOMINAL SERIES:  
   
Comparison: None Indication: Prior bariatric surgery. Abdominal pain. Concern for small bowel  
obstruction. --- ABDOMEN --- Technique:  Supine and erect views. Findings: Numerous surgical clips are present in the left upper quadrant and an  
additional clip and surgical staple line within the left midabdomen-upper  
quadrant. Nonobstructive gas pattern. No excessive stool. No pneumoperitoneum. No suspicious radiopaque calcifications are identified. --- CHEST --- Single frontal view. Findings: The lungs are well expanded and clear.   The cardiac silhouette and  
 mediastinal structures are within normal limits. US RUQ Right upper quadrant ultrasound performed. 
  
Pancreas: Incompletely visualized. What can be seen of the head appears normal. 
  
IVC: Patent. 
  
Liver: Focal area of hyperechogenicity beneath the capsule of the right lobe 
measures 1.1 x 1.5 x 1 cm. This probably represents a benign hemangioma. Overall 
the liver appears of normal size and otherwise normal echogenicity. There is 
normal flow in a patent portal vein. 
  
Gallbladder: Normal. Common bile duct diameter 4.5 mm. Negative Ureña's sign. 
  
Right kidney: Normal. 
0.9 cm in length. 
  
Other: No ascites. 
  
IMPRESSION IMPRESSION: 
1. Incomplete visualization of pancreas. 2. Most likely benign hemangioma beneath the capsule of the right lobe of the 
liver. 3. Otherwise negative. Medications given in the ED- Medications  
ondansetron (ZOFRAN) injection 4 mg (4 mg IntraVENous Given 12/6/18 1534)  
sodium chloride 0.9 % bolus infusion 1,000 mL (0 mL IntraVENous IV Completed 12/6/18 1702) pantoprazole (PROTONIX) injection 40 mg (40 mg IntraVENous Given 12/6/18 1534) Medical Decision Making I am the first provider for this patient. I reviewed the vital signs, available nursing notes, past medical history, past surgical history, family history and social history. Vital Signs-Reviewed the patient's vital signs. Records Reviewed: Nursing Notes Procedures: 
Procedures ED Course:  
4:11 PM Initial assessment performed. The patients presenting problems have been discussed, and they are in agreement with the care plan formulated and outlined with them. I have encouraged them to ask questions as they arise throughout their visit. PROGRESS NOTE: 
6:30 PM 
Discussed pt's hx and available diagnostic and imaging results with Mohit Dwyer DO, the pt's attending.  Reviewed care plans and Mohit Dwyer DO is in agreement with the plan of care. Mendez Lind DO will eval the pt. Written by [place] [address] [place] [address], ED Scribe, as dictated by Roxane Haider PA-C. FACE-TO-FACE PROGRESS NOTE: 
6:40 PM 
Introduced myself to the pt. She appears in NAD and was ambulating in the department eating ice. The patient presents with some mid epigastric pain. She denies vomiting or diarrhea with no fever My exam shows she has some epigastric tenderness upon palpation with no guarding or rebounding. Abd is soft with good bowel sounds. Pt's concerns regarding her abd pain possibly being associated with her bypass surgery but I explained that her labs are normal with no gallbladder disease and nothing seen on US gallbladder. KUB shows no obstruction. States I do not believe this is an obstruction. Will contact Dr. Eladio Albert. Kaylin Serra MD (bariatric surgery) to discuss any further imaging or recommendations. Pt has had multiple complication since her bypass. I have personally seen and examined the patient, reviewed the REHANA's note and agree with findings and plan. Written by SAVANNAH Mcdonald, as dictated by Mendez Lind DO.  
 
CONSULT NOTE: 
6:52 PM 
Roxane Haider PA-C spoke with Eladio Albert. Kaylin Serra MD, Specialty: Bariatric Surgery Discussed pt's hx, disposition, and available diagnostic and imaging results. Reviewed care plans. Consultant agrees with plans as outlined. Parminder Serra MD is happy with the workup and states that the flat and upright XR does not show concern for obstruction. There is no need for CT. Pt may be discharged to follow up with him. Written by Ashish Del Cid [address] [place] [address], ED Scribe, as dictated by Roxane Haider PA-C. Discussion: 41yo F h/o Bariatric surgery 7 months ago & SBO in past c/o upper abd pain & nausea for 24hrs, NO fever vomiting, +passing gas & BM this am. Stable vitals & diagnostics WNL including Flat & upright negative for aior fluid levels, US RUQ WNL. Dr Dominic Herzog (ED Attending) and Dr Layne Kimbrough (Bariatric Surgery) consulted and agree with d/c & f/u plan. Likely GERD & colon spasm as increased stool on xrays. Bentyl & pepcid with pcp & GI f/u. Strict return precautions discussed. Diagnosis and Disposition DISCHARGE NOTE: 
4:46 PM 
Gualberto King's  results have been reviewed with her. She has been counseled regarding her diagnosis, treatment, and plan. She verbally conveys understanding and agreement of the signs, symptoms, diagnosis, treatment and prognosis and additionally agrees to follow up as discussed. She also agrees with the care-plan and conveys that all of her questions have been answered. I have also provided discharge instructions for her that include: educational information regarding their diagnosis and treatment, and list of reasons why they would want to return to the ED prior to their follow-up appointment, should her condition change. She has been provided with education for proper emergency department utilization. CLINICAL IMPRESSION: 
1. Upper abdominal pain 2. History of bariatric surgery PLAN: 
1. D/C Home 2. Current Discharge Medication List  
  
START taking these medications Details  
famotidine (PEPCID) 40 mg tablet Take 1 Tab by mouth daily. Qty: 30 Tab, Refills: 0  
  
dicyclomine (BENTYL) 20 mg tablet Take 1 Tab by mouth every six (6) hours for 20 doses. Qty: 20 Tab, Refills: 0  
  
  
 
3. Follow-up Information Follow up With Specialties Details Why Contact Info Napoleon Lockhart MD General Surgery Schedule an appointment as soon as possible for a visit for surgery follow [address] [facility] 
[phone] [facility]  Schedule an appointment as soon as possible for a visit for primary care follow up [phone]  THE [facility] EMERGENCY DEPT Emergency Medicine  As needed, If symptoms worsen [address] 
 [address] [facility] 
[phone]  
  
 
_______________________________ Attestations: This note is prepared by Deja Maynard , acting as Scribe for Beau Momin PA-C . Beau Momin PA-C:  The scribe's documentation has been prepared under my direction and personally reviewed by me in its entirety. I confirm that the note above accurately reflects all work, treatment, procedures, and medical decision making performed by me. 
_______________________________

## 2018-12-06 NOTE — LETTER
[facility] [place] 
THE [facility] EMERGENCY DEPT 
[place] [id number] [phone] Work/School Note Date: 12/6/2018 To Whom It May concern: 
 
Tess Ibrahim was seen and treated today in the emergency room by the following provider(s): 
Attending Provider: Carlo Corley DO Physician Assistant: CHIKIS Anderson. Tess Ibrahim may return to work on 12/10/18.  
 
Sincerely, 
 
 
 
 
 
Boubacar Terry PA-C

## 2018-12-06 NOTE — DISCHARGE INSTRUCTIONS

## 2018-12-07 ENCOUNTER — OFFICE VISIT (OUTPATIENT)
Dept: SURGERY | Age: 38
End: 2018-12-07

## 2018-12-07 VITALS
SYSTOLIC BLOOD PRESSURE: 116 MMHG | BODY MASS INDEX: 30.92 KG/M2 | DIASTOLIC BLOOD PRESSURE: 68 MMHG | HEART RATE: 71 BPM | TEMPERATURE: 97.6 F | WEIGHT: 147.3 LBS | RESPIRATION RATE: 16 BRPM | HEIGHT: 58 IN | OXYGEN SATURATION: 100 %

## 2018-12-07 DIAGNOSIS — R10.13 EPIGASTRIC PAIN: Primary | ICD-10-CM

## 2018-12-07 NOTE — PROGRESS NOTES
Surgery Consultation History of Present Illness:  
 Yonathan Constantino is a prior laparoscopic gastric bypass surgery 
 patient who underwent their weight loss surgical procedure procedure approximately 8 months ago. They now present with a history of abdominal pain. Onset was 2 days ago. The pain is described as dull and is 5/10 in intensity. Starts in epigastric,does radiate to right back. Symptoms have been unchanged since onset. The patient also gives a history of nausea. The patient denies obstipation and constipation. The patient did go to the emergency room prior to this visit with me for this issue. Consultation was requested for assistance with evaluation of their symptoms. Past Medical History:  
Diagnosis Date  Constipation  Functional dyspepsia   
 uses OTC meds  GERD (gastroesophageal reflux disease)  History of colon polyps  Morbid obesity ([address]  Morbid obesity with body mass index (BMI) of 40.0 to 49.9 ([address]  Nausea & vomiting 2018  
 SBO (small bowel obstruction) (HCC) Past Surgical History:  
Procedure Laterality Date  COLONOSCOPY X 3  
 DELIVERY     
 X 3  
 HX DILATION AND CURETTAGE    HX GI    
 gastric bypass  HX OTHER SURGICAL    
 scar resection back of head  HX TUBAL LIGATION    
 HX UROLOGICAL  2001  
 stent [address] Family History Problem Relation Age of Onset  Hypertension Mother  Obesity Mother  Hypertension Father Social History Socioeconomic History  Marital status:  Spouse name: Not on file  Number of children: Not on file  Years of education: Not on file  Highest education level: Not on file Tobacco Use  Smoking status: Never Smoker  Smokeless tobacco: Never Used Substance and Sexual Activity  Alcohol use: Yes Comment: rare  Drug use: No  
 Sexual activity: Yes  
  Partners: Male Birth control/protection: Surgical  
  
Current Outpatient Medications Medication Sig  Biotin 2,500 mcg cap Take  by mouth.  cyanocobalamin (VITAMIN B12) 1,000 mcg/mL injection 1,000 mcg by IntraMUSCular route once.  famotidine (PEPCID) 40 mg tablet Take 1 Tab by mouth daily.  multivit-minerals/folic acid (ADULT MULTIVITAMIN GUMMIES PO) Take  by mouth.  calcium citrate 200 mg (950 mg) tablet Take 200 mg by mouth two (2) times a day.  polyethylene glycol (MIRALAX) 17 gram/dose powder Take 17 g by mouth daily.  dicyclomine (BENTYL) 20 mg tablet Take 1 Tab by mouth every six (6) hours for 20 doses. No current facility-administered medications for this visit. Allergies Allergen Reactions  Niacin Other (comments)  Morphine Hives and Itching Review of Symptoms:  
 
General - No history or complaints of unexpected fever, chills, or weight loss Head/Neck - No history or complaints of headache, diplopia, dysphagia, hearing loss Cardiac - No history or complaints of chest pain, palpitations, murmur, or shortness of breath Pulmonary - No history or complaints of shortness of breath, productive cough, hemoptysis Gastrointestinal - As per the HPI above. Genitourinary - No history or complaints of hematuria/dysuria, stress urinary incontinence symptoms, or renal lithiasis Musculoskeletal - No history or complaints of joint pain or muscular weakness Hematologic - No history or complaints of bleeding disorders, blood transfusions, sickle cell anemia Neurologic - No history or complaints of  migraine headaches, seizure activity, syncopal episodes, TIA or stroke Integumentary - No history or complaints of rashes, abnormal nevi, skin cancer Gynecological - No history of heavy menses/abnormal menses Physical Exam:  
 
Physical Examination: General appearance - alert, well appearing, and in no distress and oriented to person, place, and time Mental status - alert, oriented to person, place, and time, normal mood, behavior, speech, dress, motor activity, and thought processes Eyes - pupils equal and reactive, extraocular eye movements intact, sclera anicteric, left eye normal, right eye normal 
Ears - right ear normal, left ear normal 
Nose - normal and patent, no erythema, discharge or polyps Mouth - mucous membranes moist, pharynx normal without lesions Neck - supple, no significant adenopathy Lymphatics - no palpable lymphadenopathy, no hepatosplenomegaly Chest - clear to auscultation, no wheezes, rales or rhonchi, symmetric air entry Heart - normal rate, regular rhythm, normal S1, S2, no murmurs, rubs, clicks or gallops Abdomen - soft, nontender, nondistended, no masses or organomegaly Back exam - full range of motion, no tenderness, palpable spasm or pain on motion Neurological - alert, oriented, normal speech, no focal findings or movement disorder noted Musculoskeletal - no joint tenderness, deformity or swelling Extremities - peripheral pulses normal, no pedal edema, no clubbing or cyanosis, not examined Laboratory / X-Rays:  
  
Lab Results Component Value Date/Time WBC 3.9 (L) 12/06/2018 03:53 PM  
 HGB 11.1 (L) 12/06/2018 03:53 PM  
 HCT 34.4 (L) 12/06/2018 03:53 PM  
 PLATELET 315 46/97/9525 03:53 PM  
 MCV 91.7 12/06/2018 03:53 PM  
 
Lab Results Component Value Date/Time  Sodium 139 12/06/2018 03:53 PM  
 Potassium 4.2 12/06/2018 03:53 PM  
 Chloride 107 12/06/2018 03:53 PM  
 CO2 28 12/06/2018 03:53 PM  
 Anion gap 4 12/06/2018 03:53 PM  
 Glucose 81 12/06/2018 03:53 PM  
 BUN 12 12/06/2018 03:53 PM  
 Creatinine 0.58 (L) 12/06/2018 03:53 PM  
 BUN/Creatinine ratio 21 12/06/2018 03:53 PM  
 GFR est AA >60 12/06/2018 03:53 PM  
 GFR est non-AA >60 12/06/2018 03:53 PM  
 Calcium 8.9 12/06/2018 03:53 PM  
 Bilirubin, total 0.5 12/06/2018 03:53 PM  
 AST (SGOT) 30 12/06/2018 03:53 PM  
 Alk. phosphatase 74 12/06/2018 03:53 PM  
 Protein, total 7.6 12/06/2018 03:53 PM  
 Albumin 3.8 12/06/2018 03:53 PM  
 Globulin 3.8 12/06/2018 03:53 PM  
 A-G Ratio 1.0 12/06/2018 03:53 PM  
 ALT (SGPT) 26 12/06/2018 03:53 PM  
 
Lab Results Component Value Date/Time Iron 55 10/12/2018 06:38 PM  
 Ferritin 15 10/12/2018 06:38 PM  
 
Lab Results Component Value Date/Time Folate >20.0 (H) 10/12/2018 06:38 PM  
 
Lab Results Component Value Date/Time Vitamin D 25-Hydroxy 31.8 10/12/2018 06:41 PM  
   
 
 
All labs and x-rays reviewed from their ER visit and outside evaluations. It has all been scanned under the media tab or is included within [facility] or Care everywhere. US GALLBLADDER (Accession [id number]) (Order [id number]) Allergies     
 
High: Niacin Unspecified: Morphine Result Information Status: Final result (Exam End: 12/6/2018 18:00) Provider Status: Open Study Result History: Abdominal pain for one day. 
  
COMPARISON: None. 
  
Right upper quadrant ultrasound performed. 
  
Pancreas: Incompletely visualized. What can be seen of the head appears normal. 
  
IVC: Patent. 
  
Liver: Focal area of hyperechogenicity beneath the capsule of the right lobe 
measures 1.1 x 1.5 x 1 cm. This probably represents a benign hemangioma. Overall 
the liver appears of normal size and otherwise normal echogenicity. There is 
normal flow in a patent portal vein. 
  
Gallbladder: Normal. Common bile duct diameter 4.5 mm. Negative Ureña's sign. 
  
Right kidney: Normal. 
0.9 cm in length. 
  
Other: No ascites. 
  
IMPRESSION IMPRESSION: 
1. Incomplete visualization of pancreas. 2. Most likely benign hemangioma beneath the capsule of the right lobe of the 
liver. 3. Otherwise negative. Imaging US GALLBLADDER (Order: [id number]) - 12/6/2018  
  
   
External Results Report Signed by Signed Date/Time  Phone Pager Mukesh Borges 12/06/2018 18:08    
 
 
 XR ABD ACUTE W 1 V CHEST (Accession [id number]) (Order [id number]) Allergies     
 
High: Niacin Unspecified: Morphine Result Information Status: Final result (Exam End: 12/6/2018 16:21) Provider Status: Open Study Result ACUTE ABDOMINAL SERIES: 
  
Comparison: None 
  
Indication: Prior bariatric surgery. Abdominal pain. Concern for small bowel 
obstruction. 
  
--- ABDOMEN --- 
  
Technique:  Supine and erect views. 
  
Findings: Numerous surgical clips are present in the left upper quadrant and an 
additional clip and surgical staple line within the left midabdomen-upper 
quadrant. Nonobstructive gas pattern. No excessive stool. No pneumoperitoneum. No suspicious radiopaque calcifications are identified. 
  
--- CHEST --- 
  
Single frontal view.   
  
Findings: The lungs are well expanded and clear. The cardiac silhouette and 
mediastinal structures are within normal limits. 
  
IMPRESSION Impression: 
========== 
  
No acute findings. Imaging XR ABD ACUTE W 1 V CHEST (Order: [id number]) - 12/6/2018 Assessment:  
 
Sara Mei is a prior laparoscopic gastric bypass surgery 
 patient who underwent their weight loss surgical procedure procedure 
 approximately 8 months ago. They now have abdominal pain that is persistent in nature. Recommendations: The diagnosis was discussed with the patient and evaluation and treatment plans outlined. See orders for lab and imaging studies. Will proceed with CCK HIDA scan Signed By: Saige Saleem MD   
 December 7, 2018

## 2018-12-26 ENCOUNTER — HOSPITAL ENCOUNTER (OUTPATIENT)
Dept: NUCLEAR MEDICINE | Age: 38
Discharge: HOME | End: 2018-12-26
Attending: SPECIALIST
Payer: OTHER GOVERNMENT

## 2018-12-26 VITALS — BODY MASS INDEX: 30.31 KG/M2 | WEIGHT: 145 LBS

## 2018-12-26 DIAGNOSIS — R10.13 EPIGASTRIC PAIN: ICD-10-CM

## 2018-12-26 PROCEDURE — 74011000250 HC RX REV CODE- 250: Performed by: SPECIALIST

## 2018-12-26 PROCEDURE — 78226 HEPATOBILIARY SYSTEM IMAGING: CPT

## 2018-12-26 PROCEDURE — 74011250636 HC RX REV CODE- 250/636: Performed by: SPECIALIST

## 2018-12-26 RX ORDER — WATER FOR INJECTION,STERILE
VIAL (ML) INJECTION
Status: COMPLETED | OUTPATIENT
Start: 2018-12-26 | End: 2018-12-26

## 2018-12-26 RX ADMIN — SINCALIDE 1.32 MCG: 5 INJECTION, POWDER, LYOPHILIZED, FOR SOLUTION INTRAVENOUS at 08:32

## 2018-12-26 RX ADMIN — WATER 5 ML: 1 INJECTION INTRAMUSCULAR; INTRAVENOUS; SUBCUTANEOUS at 08:31

## 2019-01-23 ENCOUNTER — TELEPHONE (OUTPATIENT)
Dept: SURGERY | Age: 39
End: 2019-01-23

## 2019-01-30 ENCOUNTER — TELEPHONE (OUTPATIENT)
Dept: SURGERY | Age: 39
End: 2019-01-30

## 2019-01-30 ENCOUNTER — OFFICE VISIT (OUTPATIENT)
Dept: SURGERY | Age: 39
End: 2019-01-30

## 2019-01-30 VITALS
OXYGEN SATURATION: 100 % | WEIGHT: 135.8 LBS | SYSTOLIC BLOOD PRESSURE: 121 MMHG | HEIGHT: 58 IN | DIASTOLIC BLOOD PRESSURE: 79 MMHG | BODY MASS INDEX: 28.51 KG/M2 | RESPIRATION RATE: 16 BRPM | TEMPERATURE: 98.2 F | HEART RATE: 72 BPM

## 2019-01-30 DIAGNOSIS — K90.9 INTESTINAL MALABSORPTION, UNSPECIFIED TYPE: Primary | ICD-10-CM

## 2019-01-30 DIAGNOSIS — Z98.84 S/P GASTRIC BYPASS: ICD-10-CM

## 2019-01-30 RX ORDER — MAGNESIUM 200 MG
1000 TABLET ORAL DAILY
COMMUNITY
End: 2021-01-08

## 2019-01-30 NOTE — PATIENT INSTRUCTIONS
Body Mass Index: Care Instructions Your Care Instructions Body mass index (BMI) can help you see if your weight is raising your risk for health problems. It uses a formula to compare how much you weigh with how tall you are. · A BMI lower than 18.5 is considered underweight. · A BMI between 18.5 and 24.9 is considered healthy. · A BMI between 25 and 29.9 is considered overweight. A BMI of 30 or higher is considered obese. If your BMI is in the normal range, it means that you have a lower risk for weight-related health problems. If your BMI is in the overweight or obese range, you may be at increased risk for weight-related health problems, such as high blood pressure, heart disease, stroke, arthritis or joint pain, and diabetes. If your BMI is in the underweight range, you may be at increased risk for health problems such as fatigue, lower protection (immunity) against illness, muscle loss, bone loss, hair loss, and hormone problems. BMI is just one measure of your risk for weight-related health problems. You may be at higher risk for health problems if you are not active, you eat an unhealthy diet, or you drink too much alcohol or use tobacco products. Follow-up care is a key part of your treatment and safety. Be sure to make and go to all appointments, and call your doctor if you are having problems. It's also a good idea to know your test results and keep a list of the medicines you take. How can you care for yourself at home? · Practice healthy eating habits. This includes eating plenty of fruits, vegetables, whole grains, lean protein, and low-fat dairy. · If your doctor recommends it, get more exercise. Walking is a good choice. Bit by bit, increase the amount you walk every day. Try for at least 30 minutes on most days of the week. · Do not smoke. Smoking can increase your risk for health problems.  If you need help quitting, talk to your doctor about stop-smoking programs and medicines. These can increase your chances of quitting for good. · Limit alcohol to 2 drinks a day for men and 1 drink a day for women. Too much alcohol can cause health problems. If you have a BMI higher than 25 · Your doctor may do other tests to check your risk for weight-related health problems. This may include measuring the distance around your waist. A waist measurement of more than 40 inches in men or 35 inches in women can increase the risk of weight-related health problems. · Talk with your doctor about steps you can take to stay healthy or improve your health. You may need to make lifestyle changes to lose weight and stay healthy, such as changing your diet and getting regular exercise. If you have a BMI lower than 18.5 · Your doctor may do other tests to check your risk for health problems. · Talk with your doctor about steps you can take to stay healthy or improve your health. You may need to make lifestyle changes to gain or maintain weight and stay healthy, such as getting more healthy foods in your diet and doing exercises to build muscle. Where can you learn more? Go to http://jorge-prisca.info/. Enter S176 in the search box to learn more about \"Body Mass Index: Care Instructions. \" Current as of: June 25, 2018 Content Version: 11.9 © 6004-8907 [employer]. Care instructions adapted under license by [employer] (which disclaims liability or warranty for this information). If you have questions about a medical condition or this instruction, always ask your healthcare professional. [address] any warranty or liability for your use of this information.

## 2019-01-30 NOTE — PROGRESS NOTES
Subjective:  
 
Ashwini Rodriguez  is a 35 y.o. female who presents for follow-up about 10 days following laparoscopy with band adhesion lysis at [facility]. Body mass index is 28.38 kg/m². . I discussed her case with the surgeon who states there was no internal hernia present and her obstruction was from a band adhesion. She fells relatively normal today with only some intolerances. Weight Loss Metrics 2019 2018 2018 2018 2018 10/16/2018 2018 Today's Wt 135 lb 12.8 oz 145 lb 147 lb 4.8 oz 145 lb 8.1 oz 151 lb 6.4 oz 158 lb 11.2 oz 178 lb 14.4 oz BMI 28.38 kg/m2 30.31 kg/m2 30.79 kg/m2 30.41 kg/m2 31.64 kg/m2 33.17 kg/m2 37.39 kg/m2 Surgery related complication: none She reports as above and denies vomiting. Patients pain score:0 The patient's exercise level: not active. Changes in her medical history and medications have been reviewed. Patient Active Problem List  
Diagnosis Code  Morbid obesity ([facility]) E66.01  
 Constipation K59.00  
 History of colon polyps Z86.010  
 Intestinal malabsorption K90.9  S/P gastric bypass Z98.84  
 Hypokalemia E87.6  Hypomagnesemia E83.42  
 Iron deficiency E61.1  Pica F50.89  Intertriginous candidiasis B37.2  Internal hernia K45.8 Past Medical History:  
Diagnosis Date  Constipation  Functional dyspepsia   
 uses OTC meds  GERD (gastroesophageal reflux disease)  History of colon polyps  Morbid obesity ([address]  Morbid obesity with body mass index (BMI) of 40.0 to 49.9 ([address]  Nausea & vomiting 2018  
 SBO (small bowel obstruction) ([facility]) Past Surgical History:  
Procedure Laterality Date  COLONOSCOPY X 3  
 DELIVERY     
 X 3  
 HX DILATION AND CURETTAGE    HX GI    
 gastric bypass  HX OTHER SURGICAL    
 scar resection back of head  HX TUBAL LIGATION    
 HX UROLOGICAL    
 stent [address] Current Outpatient Medications Medication Sig Dispense Refill  cyanocobalamin (VITAMIN B-12) 1,000 mcg sublingual tablet Take 1,000 mcg by mouth daily.  Biotin 2,500 mcg cap Take  by mouth.  multivit-minerals/folic acid (ADULT MULTIVITAMIN GUMMIES PO) Take  by mouth.  calcium citrate 200 mg (950 mg) tablet Take 200 mg by mouth two (2) times a day.  polyethylene glycol (MIRALAX) 17 gram/dose powder Take 17 g by mouth daily. 1700 g 3  
 cyanocobalamin (VITAMIN B12) 1,000 mcg/mL injection 1,000 mcg by IntraMUSCular route once.  famotidine (PEPCID) 40 mg tablet Take 1 Tab by mouth daily. 30 Tab 0 Objective:  
 
Visit Vitals /79 (BP 1 Location: Left arm, BP Patient Position: Sitting) Pulse 72 Temp 98.2 °F (36.8 °C) Resp 16 Ht 4' 10\" (1.473 m) Wt 61.6 kg (135 lb 12.8 oz) SpO2 100% BMI 28.38 kg/m² Physical Exam: 
 
General:  alert, cooperative, no distress, appears stated age Heart:  n/a Abdomen:   abdomen is soft without significant tenderness, masses, organomegaly or guarding; Incisions: healing well Assessment:  
 
1. History of Morbid obesity, status post  diagnostic laparoscopy with lysis of adhesions doing well. Plan: 1. Remember to measure portions, continue low carbohydrate diet 2. Advance diet to puree phase then solids. 3. Remember vitamin supplements. 4. Start cardio exercise. 5. Attend support group 6. Follow-up in 2 month(s). 7. Total time spent with the patient 20 minutes. 8. Small bowel series if symptoms recur.

## 2019-01-30 NOTE — TELEPHONE ENCOUNTER
Patient seeking opinion about short term disablility forms. Advised patient that her signed release was sent via fax from this office. Advised pt to have forms sent to all providers whose care she is under.

## 2019-02-06 ENCOUNTER — TELEPHONE (OUTPATIENT)
Dept: SURGERY | Age: 39
End: 2019-02-06

## 2019-02-06 DIAGNOSIS — R10.11 RIGHT UPPER QUADRANT ABDOMINAL PAIN: Primary | ICD-10-CM

## 2019-02-06 NOTE — TELEPHONE ENCOUNTER
I returned patient's phone call from earlier today. She had an appt with her surgeon today at [facility]. She has been taking her narcotic up until 2-3 days ago. She has only had two BM since her last surgery. The last one was 2 days ago. She had to take a stool softener, Miralax and a suppository in order to have the last BM. She states that she has been having RUQ abdominal pain similar to the pain she was having prior to her last surgery. I ended our phone call to give me a chance to speak with Dr. Zuri Potter. He said that he spoke to the surgeon at [facility]. Patient had an adhesion which was potentially causing her symptoms. She did not have an internal hernia. Dr. Zuri Potter told me to order a small bowel follow through for next week. I have placed the order. I have called patient back to let her know that I have ordered the small bowel series and that someone from central scheduling will be calling her. I conveyed that I put a note in the order that the test is to be completed next week. She asked what she is supposed to do about work because she is having abdominal pain and she is okay for about four hours, but after that she is too miserable to be sitting at work and trying to focus. I told her that I had no problem writing a work note for her, but that we would not be able to do any [facility] paperwork for her because another surgeon completed her last surgery. She verbalized understanding. I explained that I will leave the letter at the  for her. She is to make an appt to follow up with specifically Dr. Zuri Potter for after her small bowel series. She verbalized understanding. She is currently on her way to her PCP to attain a referral to return to our practice.   Apparently her insurance will not pay for her visits without another referral.
no

## 2019-02-13 ENCOUNTER — HOSPITAL ENCOUNTER (OUTPATIENT)
Dept: GENERAL RADIOLOGY | Age: 39
Discharge: HOME | End: 2019-02-13
Attending: PHYSICIAN ASSISTANT
Payer: OTHER GOVERNMENT

## 2019-02-13 DIAGNOSIS — R10.11 RIGHT UPPER QUADRANT ABDOMINAL PAIN: ICD-10-CM

## 2019-02-13 PROCEDURE — 74250 X-RAY XM SM INT 1CNTRST STD: CPT

## 2019-02-13 PROCEDURE — 74011000255 HC RX REV CODE- 255: Performed by: PHYSICIAN ASSISTANT

## 2019-02-13 RX ADMIN — BARIUM SULFATE 600 ML: 240 SUSPENSION ORAL at 13:05

## 2019-02-15 ENCOUNTER — TELEPHONE (OUTPATIENT)
Dept: SURGERY | Age: 39
End: 2019-02-15

## 2019-02-18 ENCOUNTER — OFFICE VISIT (OUTPATIENT)
Dept: SURGERY | Age: 39
End: 2019-02-18

## 2019-02-18 DIAGNOSIS — K59.00 CONSTIPATION, UNSPECIFIED CONSTIPATION TYPE: Primary | ICD-10-CM

## 2019-02-18 DIAGNOSIS — Z98.84 S/P GASTRIC BYPASS: ICD-10-CM

## 2019-02-18 DIAGNOSIS — K90.9 INTESTINAL MALABSORPTION, UNSPECIFIED TYPE: ICD-10-CM

## 2019-02-18 NOTE — PATIENT INSTRUCTIONS
Body Mass Index: Care Instructions Your Care Instructions Body mass index (BMI) can help you see if your weight is raising your risk for health problems. It uses a formula to compare how much you weigh with how tall you are. · A BMI lower than 18.5 is considered underweight. · A BMI between 18.5 and 24.9 is considered healthy. · A BMI between 25 and 29.9 is considered overweight. A BMI of 30 or higher is considered obese. If your BMI is in the normal range, it means that you have a lower risk for weight-related health problems. If your BMI is in the overweight or obese range, you may be at increased risk for weight-related health problems, such as high blood pressure, heart disease, stroke, arthritis or joint pain, and diabetes. If your BMI is in the underweight range, you may be at increased risk for health problems such as fatigue, lower protection (immunity) against illness, muscle loss, bone loss, hair loss, and hormone problems. BMI is just one measure of your risk for weight-related health problems. You may be at higher risk for health problems if you are not active, you eat an unhealthy diet, or you drink too much alcohol or use tobacco products. Follow-up care is a key part of your treatment and safety. Be sure to make and go to all appointments, and call your doctor if you are having problems. It's also a good idea to know your test results and keep a list of the medicines you take. How can you care for yourself at home? · Practice healthy eating habits. This includes eating plenty of fruits, vegetables, whole grains, lean protein, and low-fat dairy. · If your doctor recommends it, get more exercise. Walking is a good choice. Bit by bit, increase the amount you walk every day. Try for at least 30 minutes on most days of the week. · Do not smoke. Smoking can increase your risk for health problems.  If you need help quitting, talk to your doctor about stop-smoking programs and medicines. These can increase your chances of quitting for good. · Limit alcohol to 2 drinks a day for men and 1 drink a day for women. Too much alcohol can cause health problems. If you have a BMI higher than 25 · Your doctor may do other tests to check your risk for weight-related health problems. This may include measuring the distance around your waist. A waist measurement of more than 40 inches in men or 35 inches in women can increase the risk of weight-related health problems. · Talk with your doctor about steps you can take to stay healthy or improve your health. You may need to make lifestyle changes to lose weight and stay healthy, such as changing your diet and getting regular exercise. If you have a BMI lower than 18.5 · Your doctor may do other tests to check your risk for health problems. · Talk with your doctor about steps you can take to stay healthy or improve your health. You may need to make lifestyle changes to gain or maintain weight and stay healthy, such as getting more healthy foods in your diet and doing exercises to build muscle. Where can you learn more? Go to http://jorge-prisca.info/. Enter S176 in the search box to learn more about \"Body Mass Index: Care Instructions. \" Current as of: June 25, 2018 Content Version: 11.9 © 1341-4945 [employer]. Care instructions adapted under license by [employer] (which disclaims liability or warranty for this information). If you have questions about a medical condition or this instruction, always ask your healthcare professional. [place] [address] any warranty or liability for your use of this information. Patient Instructions 1. Continue to monitor carbohydrate and protein intake- remember to keep your           total  carbohydrates to 50 grams or less per day for best results. 2. Remember hydration goals - usually 48 to 64 ounces of liquids per day 3. Continue to work towards exercise goals - minimum 3 days per week of 45          minutes to  1 hour at a time. 4. Remember to take vitamins as directed Supplement Resource Guide Importance of Protein:  
Maintains lean body mass, produces antibodies to fight off infections, heals wounds, minimizes hair loss, helps to give you energy, helps with satiety, and keeping you full between meals. Importance of Calcium: 
Needed for healthy bones and teeth, normal blood clotting, and nervous system functioning, higher risk of osteoporosis and bone disease with non-compliance. Importance of Multivitamins: Many functions. Supply you with extra nutrients that you may be missing from food. May lead to iron deficiency anemia, weakness, fatigue, and many other symptoms with non-compliance. Importance of B Vitamins: 
Important for red blood cell formation, metabolism, energy, and helps to maintain a healthy nervous system. Protein Supplement Find one you like now. Use immediately after surgery. Look for: 
35-50g protein each day from your protein supplement once you reach the progression diet. 0-3 g fat per serving 0-3 g sugar per serving Protein drinks should be split in separate dosages. Recommend: Lifelong 1 year +Calcium Supplement:  
 
Start taking within a month after surgery. Look for: Calcium Citrate Plus D (1500 mg per day) Recommend: Citracal 
 
 . Avoid chocolate chewable calcium. Can use chewable bariatric or [employer] brand or similar chewable. The body cannot absorb more than 500-600 mg @ a time. Take for Life Multi-vitamin Supplement:   
1st Month After Surgery: Any complete chewable, such as: [place]s Complete chewables. Avoid [place] sours or gummies. They lack iron and other important nutrients and also have added sugar. Continue with chewable vitamin or change to adult complete multivitamin one month after surgery. Menstruating women can take a prenatal vitamin. Make sure has at least 18 mg iron and 108-635 mcg folic acid): Vitamin B12, B Complex Vitamin, and Biotin Start taking within a month after surgery. Vitamin B12:  1000 mcg of Vitamin B12 three times weekly Must take sublingually (meaning you take it under your tongue) or in a liquid drop form for easy absorption. B Complex Vitamin: Take a pill or liquid drop form once daily. Biotin: This vitamin can help prevent hair loss. Recommend 5mg  
(5000 mcg) a day Biotin is Optional

## 2019-02-22 VITALS
HEART RATE: 75 BPM | RESPIRATION RATE: 16 BRPM | SYSTOLIC BLOOD PRESSURE: 110 MMHG | TEMPERATURE: 98 F | HEIGHT: 58 IN | OXYGEN SATURATION: 100 % | WEIGHT: 135.9 LBS | BODY MASS INDEX: 28.53 KG/M2 | DIASTOLIC BLOOD PRESSURE: 75 MMHG

## 2019-02-22 NOTE — PROGRESS NOTES
Subjective:  
 
Ivy Brennan  is a 35 y.o. female who presents for follow-up about 10 months following laparoscopic gastric bypass surgery. She has lost a total of 95 pounds since surgery. Body mass index is 28.4 kg/m². . EBWL is (84%). The patient presents today to assess their progress toward their goal of weight loss and to address any issues that may be present. Today the patient and I have reviewed their diet and how appropriate their food choices are. The following issues have been identified : severe constipation. Weight Loss Metrics 2/18/2019 1/30/2019 12/26/2018 12/7/2018 12/6/2018 11/7/2018 10/16/2018 Today's Wt 135 lb 14.4 oz 135 lb 12.8 oz 145 lb 147 lb 4.8 oz 145 lb 8.1 oz 151 lb 6.4 oz 158 lb 11.2 oz  
BMI 28.4 kg/m2 28.38 kg/m2 30.31 kg/m2 30.79 kg/m2 30.41 kg/m2 31.64 kg/m2 33.17 kg/m2 Dafne Cervantes Surgery related complication: had laparoscopy x 2 for pelvic band adhesions this year due to prior Gyn surgery. She reports severe constipation and denies vomiting. The patients diet choices have been reviewed today and are very good Patients pain score:0 The patient's exercise level: very active. Changes in her medical history and medications have been reviewed. Patient Active Problem List  
Diagnosis Code  Morbid obesity ([facility]) E66.01  
 Constipation K59.00  
 History of colon polyps Z86.010  
 Intestinal malabsorption K90.9  S/P gastric bypass Z98.84  
 Hypokalemia E87.6  Hypomagnesemia E83.42  
 Iron deficiency E61.1  Pica F50.89  Intertriginous candidiasis B37.2  Internal hernia K45.8 Past Medical History:  
Diagnosis Date  Constipation  Functional dyspepsia   
 uses OTC meds  GERD (gastroesophageal reflux disease)  History of colon polyps  Morbid obesity [facility]  Morbid obesity with body mass index (BMI) of 40.0 to 49.9 ([facility]  Nausea & vomiting 6/6/2018  
 SBO (small bowel obstruction) ([facility]) Past Surgical History: Procedure Laterality Date  COLONOSCOPY X 3  
 DELIVERY     
 X 3  
 HX DILATION AND CURETTAGE    HX GI    
 gastric bypass  HX OTHER SURGICAL    
 scar resection back of head  HX TUBAL LIGATION    
 HX UROLOGICAL    
 stent [address] Current Outpatient Medications Medication Sig Dispense Refill  cyanocobalamin (VITAMIN B-12) 1,000 mcg sublingual tablet Take 1,000 mcg by mouth daily.  Biotin 2,500 mcg cap Take  by mouth.  multivit-minerals/folic acid (ADULT MULTIVITAMIN GUMMIES PO) Take  by mouth.  calcium citrate 200 mg (950 mg) tablet Take 200 mg by mouth two (2) times a day.  polyethylene glycol (MIRALAX) 17 gram/dose powder Take 17 g by mouth daily. 1700 g 3  
 cyanocobalamin (VITAMIN B12) 1,000 mcg/mL injection 1,000 mcg by IntraMUSCular route once.  famotidine (PEPCID) 40 mg tablet Take 1 Tab by mouth daily. 30 Tab 0 Review of Symptoms:  
 
 
General - No history or complaints of unexpected fever or chills Head/Neck - No history or complaints of headache or dizziness Cardiac - No history or complaints of chest pain, palpitations, or shortness of breath Pulmonary - No history or complaints of shortness of breath or productive cough Gastrointestinal - as noted above Genitourinary - No history or complaints of hematuria/dysuria or renal lithiasis Musculoskeletal - No history or complaints of joint  muscular weakness Hematologic - No history of any bleeding episodes Neurologic - No history or complaints of  migraine headaches or neurologic symptoms Objective:  
 
Visit Vitals /75 (BP 1 Location: Left arm, BP Patient Position: Sitting) Pulse 75 Temp 98 °F (36.7 °C) Resp 16 Ht 4' 10\" (1.473 m) Wt 61.6 kg (135 lb 14.4 oz) SpO2 100% BMI 28.40 kg/m² Physical Exam: 
 
General:  alert, cooperative, no distress, appears stated age Lungs:   clear to auscultation bilaterally Heart:  Regular rate and rhythm Abdomen:   abdomen is soft without significant tenderness, masses, organomegaly or guarding; Incisions: healing well, no hernias Small Bowel Series: XR SMALL BOWEL SERIES (Accession [id number]) (Order [id number]) Allergies     
 
High: Niacin Unspecified: Morphine Exam Information Status Exam Begun  Exam Ended Final [99] 2/13/2019 09:46 2/13/2019 12:38 Result Information Status: Final result (Exam End: 2/13/2019 12:38) Provider Status: Open Study Result EXAM:  XR SMALL BOWEL SERIES 
  
INDICATION:   abdominal pain of unclear etiology, patient is s/p gastric bypass 
and diagnostic laparoscopy for SBO 
  
COMPARISON: Abdominal series dated December 6, 2018 and upper GI study dated April 6, 2018. 
  
FLUOROSCOPY DOSE:  236.7 mGy. 
  
FINDINGS: The preliminary radiograph of the abdomen shows a normal bowel gas 
pattern. Surgical clips and anastomotic suture material noted in keeping with 
prior gastric bypass surgery. 
  
The patient ingested 2 cups of barium and the contrast was followed 
radiographically through the small bowel with serial overhead films. Postsurgical anatomy in keeping with prior gastric bypass surgery. 
  
Spot fluoroscopy of the small bowel including the terminal ileum demonstrates no 
abnormality. 
  
IMPRESSION IMPRESSION: Normal small bowel series, status post gastric bypass surgery. 
   
 
NM HEPATOBILIARY DUCT SCAN (Accession [id number]) (Order [id number]) Allergies     
 
High: Niacin Unspecified: Morphine Exam Information Status Exam Begun  Exam Ended Final [99] 12/26/2018 07:32 12/26/2018 09:13 Result Information Status: Final result (Exam End: 12/26/2018 09:13) Provider Status: Reviewed Study Result HEPATOBILIARY SCAN  
  
INDICATION: Right upper quadrant pain, evaluate for acalculous cholecystitis 
  
 Comparison: Right upper quadrant ultrasound 12/6/2018 
  
DESCRIPTION: Right antecubital fossa venous access. Following intravenous 
administration of 6.3 mCi 99mTechnetium Mebrofenin via left antecubital fossa IV 
access, dynamic imaging of the upper abdomen was performed in the anterior 
projection for 60 minutes. There is prompt and homogeneous uptake of 
radiopharmaceutical by the liver followed by prompt excretion into the biliary 
tree, gallbladder, and small bowel.   
  
During dynamic imaging there is adequate opacification and subsequent 
contraction of the gallbladder with an estimated gallbladder ejection fraction 
of approximately 50% (normal is greater then 35%).   
IMPRESSION IMPRESSION:   
  
1. Adequately patent cystic duct excludes acute cholecystitis. 2. Normal gallbladder ejection fraction excludes chronic cholecystitis and 
biliary dyskinesia. Imaging NM HEPATOBILIARY DUCT SCAN (Order: [id number]) - 12/26/2018 Result History NM HEPATOBILIARY DUCT SCAN (Order #[id number]) on 12/26/2018 - Order Result History Report Assessment:  
 
1. History of Morbid obesity, status post  laparoscopic gastric bypass surgery. Doing well, no concerns. Severe constipation likelt secondary to narcotics from laparoscopy performed at [facility] by another surgeon. Plan:  
 
1. Increase fiber in diet 2. I have given a regimen of enemas and PO laxatives today 3. Start Miralax then transition to Metamucil after this constipation episode has resolved

## 2019-04-15 ENCOUNTER — APPOINTMENT (OUTPATIENT)
Dept: GENERAL RADIOLOGY | Age: 39
End: 2019-04-15
Attending: PHYSICIAN ASSISTANT
Payer: OTHER GOVERNMENT

## 2019-04-15 ENCOUNTER — HOSPITAL ENCOUNTER (EMERGENCY)
Age: 39
Discharge: HOME | End: 2019-04-15
Attending: EMERGENCY MEDICINE
Payer: OTHER GOVERNMENT

## 2019-04-15 VITALS
OXYGEN SATURATION: 99 % | RESPIRATION RATE: 17 BRPM | TEMPERATURE: 97.9 F | SYSTOLIC BLOOD PRESSURE: 122 MMHG | WEIGHT: 138 LBS | BODY MASS INDEX: 29.77 KG/M2 | HEART RATE: 74 BPM | DIASTOLIC BLOOD PRESSURE: 74 MMHG | HEIGHT: 57 IN

## 2019-04-15 DIAGNOSIS — G89.29 CHRONIC ABDOMINAL PAIN: ICD-10-CM

## 2019-04-15 DIAGNOSIS — R10.84 ABDOMINAL PAIN, GENERALIZED: Primary | ICD-10-CM

## 2019-04-15 DIAGNOSIS — K52.9 GASTROENTERITIS, ACUTE: ICD-10-CM

## 2019-04-15 DIAGNOSIS — R10.9 CHRONIC ABDOMINAL PAIN: ICD-10-CM

## 2019-04-15 LAB
ALBUMIN SERPL-MCNC: 4 G/DL (ref 3.4–5)
ALBUMIN/GLOB SERPL: 1 {RATIO} (ref 0.8–1.7)
ALP SERPL-CCNC: 83 U/L (ref 45–117)
ALT SERPL-CCNC: 32 U/L (ref 13–56)
ANION GAP SERPL CALC-SCNC: 7 MMOL/L (ref 3–18)
APPEARANCE UR: CLEAR
AST SERPL-CCNC: 33 U/L (ref 15–37)
BACTERIA URNS QL MICRO: ABNORMAL /HPF
BASOPHILS # BLD: 0 K/UL (ref 0–0.1)
BASOPHILS NFR BLD: 0 % (ref 0–2)
BILIRUB SERPL-MCNC: 0.3 MG/DL (ref 0.2–1)
BILIRUB UR QL: NEGATIVE
BUN SERPL-MCNC: 16 MG/DL (ref 7–18)
BUN/CREAT SERPL: 24 (ref 12–20)
CALCIUM SERPL-MCNC: 8.8 MG/DL (ref 8.5–10.1)
CHLORIDE SERPL-SCNC: 107 MMOL/L (ref 100–108)
CO2 SERPL-SCNC: 24 MMOL/L (ref 21–32)
COLOR UR: ABNORMAL
CREAT SERPL-MCNC: 0.68 MG/DL (ref 0.6–1.3)
DIFFERENTIAL METHOD BLD: ABNORMAL
EOSINOPHIL # BLD: 0.1 K/UL (ref 0–0.4)
EOSINOPHIL NFR BLD: 2 % (ref 0–5)
EPITH CASTS URNS QL MICRO: ABNORMAL /LPF (ref 0–5)
ERYTHROCYTE [DISTWIDTH] IN BLOOD BY AUTOMATED COUNT: 13.1 % (ref 11.6–14.5)
GLOBULIN SER CALC-MCNC: 3.9 G/DL (ref 2–4)
GLUCOSE SERPL-MCNC: 94 MG/DL (ref 74–99)
GLUCOSE UR STRIP.AUTO-MCNC: NEGATIVE MG/DL
HCG UR QL: NEGATIVE
HCT VFR BLD AUTO: 36.4 % (ref 35–45)
HEMOCCULT STL QL: NEGATIVE
HGB BLD-MCNC: 11.7 G/DL (ref 12–16)
HGB UR QL STRIP: NEGATIVE
KETONES UR QL STRIP.AUTO: NEGATIVE MG/DL
LEUKOCYTE ESTERASE UR QL STRIP.AUTO: NEGATIVE
LIPASE SERPL-CCNC: 187 U/L (ref 73–393)
LYMPHOCYTES # BLD: 1.6 K/UL (ref 0.9–3.6)
LYMPHOCYTES NFR BLD: 27 % (ref 21–52)
MCH RBC QN AUTO: 28.9 PG (ref 24–34)
MCHC RBC AUTO-ENTMCNC: 32.1 G/DL (ref 31–37)
MCV RBC AUTO: 89.9 FL (ref 74–97)
MONOCYTES # BLD: 0.4 K/UL (ref 0.05–1.2)
MONOCYTES NFR BLD: 8 % (ref 3–10)
MUCOUS THREADS URNS QL MICRO: ABNORMAL /LPF
NEUTS SEG # BLD: 3.5 K/UL (ref 1.8–8)
NEUTS SEG NFR BLD: 63 % (ref 40–73)
NITRITE UR QL STRIP.AUTO: NEGATIVE
PH UR STRIP: 5 [PH] (ref 5–8)
PLATELET # BLD AUTO: 174 K/UL (ref 135–420)
PMV BLD AUTO: 9.6 FL (ref 9.2–11.8)
POTASSIUM SERPL-SCNC: 4.1 MMOL/L (ref 3.5–5.5)
PROT SERPL-MCNC: 7.9 G/DL (ref 6.4–8.2)
PROT UR STRIP-MCNC: ABNORMAL MG/DL
RBC # BLD AUTO: 4.05 M/UL (ref 4.2–5.3)
RBC #/AREA URNS HPF: NEGATIVE /HPF (ref 0–5)
SODIUM SERPL-SCNC: 138 MMOL/L (ref 136–145)
SP GR UR REFRACTOMETRY: >1.03 (ref 1–1.03)
UROBILINOGEN UR QL STRIP.AUTO: 1 EU/DL (ref 0.2–1)
WBC # BLD AUTO: 5.7 K/UL (ref 4.6–13.2)
WBC URNS QL MICRO: ABNORMAL /HPF (ref 0–5)

## 2019-04-15 PROCEDURE — 99284 EMERGENCY DEPT VISIT MOD MDM: CPT

## 2019-04-15 PROCEDURE — 74022 RADEX COMPL AQT ABD SERIES: CPT

## 2019-04-15 PROCEDURE — 85025 COMPLETE CBC W/AUTO DIFF WBC: CPT

## 2019-04-15 PROCEDURE — 80053 COMPREHEN METABOLIC PANEL: CPT

## 2019-04-15 PROCEDURE — 82272 OCCULT BLD FECES 1-3 TESTS: CPT

## 2019-04-15 PROCEDURE — 81025 URINE PREGNANCY TEST: CPT

## 2019-04-15 PROCEDURE — 83690 ASSAY OF LIPASE: CPT

## 2019-04-15 PROCEDURE — 81001 URINALYSIS AUTO W/SCOPE: CPT

## 2019-04-15 PROCEDURE — 74011250637 HC RX REV CODE- 250/637: Performed by: PHYSICIAN ASSISTANT

## 2019-04-15 RX ORDER — ONDANSETRON 4 MG/1
4 TABLET, ORALLY DISINTEGRATING ORAL
Status: DISCONTINUED | OUTPATIENT
Start: 2019-04-15 | End: 2019-04-15 | Stop reason: HOSPADM

## 2019-04-15 RX ORDER — DICYCLOMINE HYDROCHLORIDE 10 MG/1
10 CAPSULE ORAL 4 TIMES DAILY
Status: DISCONTINUED | OUTPATIENT
Start: 2019-04-15 | End: 2019-04-15 | Stop reason: HOSPADM

## 2019-04-15 RX ORDER — ONDANSETRON 4 MG/1
4 TABLET, FILM COATED ORAL
Qty: 15 TAB | Refills: 0 | Status: SHIPPED | OUTPATIENT
Start: 2019-04-15 | End: 2019-04-17

## 2019-04-15 RX ORDER — DICYCLOMINE HYDROCHLORIDE 10 MG/1
10 CAPSULE ORAL 4 TIMES DAILY
Qty: 20 CAP | Refills: 0 | Status: SHIPPED | OUTPATIENT
Start: 2019-04-15 | End: 2019-04-20

## 2019-04-15 RX ADMIN — DICYCLOMINE HYDROCHLORIDE 10 MG: 10 CAPSULE ORAL at 18:23

## 2019-04-15 NOTE — DISCHARGE INSTRUCTIONS

## 2019-04-15 NOTE — ED PROVIDER NOTES
EMERGENCY DEPARTMENT HISTORY AND PHYSICAL EXAM 
 
Date: 4/15/2019 Patient Name: Kymberly Lester History of Presenting Illness Chief Complaint Patient presents with  Abdominal Pain History Provided By: Patient Chief Complaint: abd pain Additional History (Context):  
5:42 PM 
Gualberto Garvey is a 35 y.o. female with PMHX gastric bypass, small bowel obstruction presents to the emergency department C/O Generalized abdominal pain that began around 4:30 this afternoon. She had a bowel movement consisting of large amount of soft stool that she noted was dark. At that time she felt lightheaded. No bright red blood. She had an episode of dry heaving. Denies any fever, urinary symptoms. States she did take a bite of a turkey sandwich around 2 pm that did not taste right. She was seen on 4/7 at [facility] where she had overall unremarkable labs and a CT that showed large amount of stool. She was started on Cipro and Flagyl. Of gastric bypass and multiple small bowel obstructions. Last menstrual period was this month but cannot recall exact date. PCP: Other, MD [place] 
 
Current Facility-Administered Medications Medication Dose Route Frequency Provider Last Rate Last Dose  dicyclomine (BENTYL) capsule 10 mg  10 mg Oral QID [facility] [place] [place]   10 mg at 04/15/19 4763  ondansetron (ZOFRAN ODT) tablet 4 mg  4 mg Oral NOW Jenet Canavan M, PA Current Outpatient Medications Medication Sig Dispense Refill  ondansetron hcl (ZOFRAN) 4 mg tablet Take 1 Tab by mouth every eight (8) hours as needed for Nausea. 15 Tab 0  
 dicyclomine (BENTYL) 10 mg capsule Take 1 Cap by mouth four (4) times daily for 5 days. 20 Cap 0  
 cyanocobalamin (VITAMIN B-12) 1,000 mcg sublingual tablet Take 1,000 mcg by mouth daily.  Biotin 2,500 mcg cap Take  by mouth.  multivit-minerals/folic acid (ADULT MULTIVITAMIN GUMMIES PO) Take  by mouth.  calcium citrate 200 mg (950 mg) tablet Take 200 mg by mouth two (2) times a day.  cyanocobalamin (VITAMIN B12) 1,000 mcg/mL injection 1,000 mcg by IntraMUSCular route once.  famotidine (PEPCID) 40 mg tablet Take 1 Tab by mouth daily. 30 Tab 0  
 polyethylene glycol (MIRALAX) 17 gram/dose powder Take 17 g by mouth daily. 1700 g 3 Past History Past Medical History: 
Past Medical History:  
Diagnosis Date  Constipation  Functional dyspepsia   
 uses OTC meds  GERD (gastroesophageal reflux disease)  History of colon polyps  Morbid obesity ([facility]  Morbid obesity with body mass index (BMI) of 40.0 to 49.9 ([facility]  Nausea & vomiting 2018  
 SBO (small bowel obstruction) ([facility]) Past Surgical History: 
Past Surgical History:  
Procedure Laterality Date  COLONOSCOPY X 3  
 DELIVERY     
 X 3  
 HX DILATION AND CURETTAGE  2005  HX GI    
 gastric bypass  HX OTHER SURGICAL    
 scar resection back of head  HX TUBAL LIGATION    
 HX UROLOGICAL    
 stent [address] Family History: 
Family History Problem Relation Age of Onset  Hypertension Mother  Obesity Mother  Hypertension Father Social History: 
Social History Tobacco Use  Smoking status: Never Smoker  Smokeless tobacco: Never Used Substance Use Topics  Alcohol use: Yes Comment: rare  Drug use: No  
 
 
Allergies: Allergies Allergen Reactions  Niacin Other (comments)  Morphine Hives and Itching Review of Systems Review of Systems Constitutional: Negative for chills and fever. Gastrointestinal: Positive for abdominal pain, diarrhea, nausea and vomiting. Genitourinary: Negative for decreased urine volume, difficulty urinating, dysuria, enuresis, frequency and urgency. Neurological: Positive for light-headedness. All other systems reviewed and are negative. Physical Exam  
 
Vitals: 04/15/19 1754 04/15/19 1833 BP: 122/74 Pulse: 74 Resp: 17 Temp: 97.9 °F (36.6 °C) SpO2: 99% Weight:  62.6 kg (138 lb) Height:  4' 9\" (1.448 m) Physical Exam  
Constitutional: She is oriented to person, place, and time. She appears well-developed and well-nourished. Alert, lying on stretcher, non toxic HENT:  
Head: Normocephalic and atraumatic. Neck: Normal range of motion. Neck supple. Cardiovascular: Normal rate, regular rhythm, normal heart sounds and intact distal pulses. No murmur heard. Pulmonary/Chest: Effort normal and breath sounds normal. No respiratory distress. She has no wheezes. She has no rales. Abdominal: Soft. Bowel sounds are normal. There is no hepatosplenomegaly. There is generalized tenderness. There is guarding (voluntary ). There is no rebound and no CVA tenderness. Old healed surgical scars to the abd Neurological: She is alert and oriented to person, place, and time. Skin: Skin is warm and dry. Psychiatric: She has a normal mood and affect. Judgment normal.  
Nursing note and vitals reviewed. Diagnostic Study Results Labs: 
  
Recent Results (from the past 12 hour(s)) URINALYSIS W/ RFLX MICROSCOPIC Collection Time: 04/15/19  5:52 PM  
Result Value Ref Range Color DARK YELLOW Appearance CLEAR Specific gravity >1.030 (H) 1.005 - 1.030  
 pH (UA) 5.0 5.0 - 8.0 Protein TRACE (A) NEG mg/dL Glucose NEGATIVE  NEG mg/dL Ketone NEGATIVE  NEG mg/dL Bilirubin NEGATIVE  NEG Blood NEGATIVE  NEG Urobilinogen 1.0 0.2 - 1.0 EU/dL Nitrites NEGATIVE  NEG Leukocyte Esterase NEGATIVE  NEG    
URINE MICROSCOPIC ONLY Collection Time: 04/15/19  5:52 PM  
Result Value Ref Range WBC 0 to 2 0 - 5 /hpf  
 RBC NEGATIVE  0 - 5 /hpf Epithelial cells FEW 0 - 5 /lpf Bacteria 1+ (A) NEG /hpf Mucus 1+ (A) NEG /lpf OCCULT BLOOD, STOOL Collection Time: 04/15/19  6:15 PM  
Result Value Ref Range Occult blood, stool NEGATIVE  NEG    
CBC WITH AUTOMATED DIFF Collection Time: 04/15/19  6:20 PM  
Result Value Ref Range WBC 5.7 4.6 - 13.2 K/uL  
 RBC 4.05 (L) 4.20 - 5.30 M/uL  
 HGB 11.7 (L) 12.0 - 16.0 g/dL HCT 36.4 35.0 - 45.0 % MCV 89.9 74.0 - 97.0 FL  
 MCH 28.9 24.0 - 34.0 PG  
 MCHC 32.1 31.0 - 37.0 g/dL  
 RDW 13.1 11.6 - 14.5 % PLATELET 615 503 - 454 K/uL MPV 9.6 9.2 - 11.8 FL  
 NEUTROPHILS 63 40 - 73 % LYMPHOCYTES 27 21 - 52 % MONOCYTES 8 3 - 10 % EOSINOPHILS 2 0 - 5 % BASOPHILS 0 0 - 2 %  
 ABS. NEUTROPHILS 3.5 1.8 - 8.0 K/UL  
 ABS. LYMPHOCYTES 1.6 0.9 - 3.6 K/UL  
 ABS. MONOCYTES 0.4 0.05 - 1.2 K/UL  
 ABS. EOSINOPHILS 0.1 0.0 - 0.4 K/UL  
 ABS. BASOPHILS 0.0 0.0 - 0.1 K/UL  
 DF AUTOMATED METABOLIC PANEL, COMPREHENSIVE Collection Time: 04/15/19  6:20 PM  
Result Value Ref Range Sodium 138 136 - 145 mmol/L Potassium 4.1 3.5 - 5.5 mmol/L Chloride 107 100 - 108 mmol/L  
 CO2 24 21 - 32 mmol/L Anion gap 7 3.0 - 18 mmol/L Glucose 94 74 - 99 mg/dL BUN 16 7.0 - 18 MG/DL Creatinine 0.68 0.6 - 1.3 MG/DL  
 BUN/Creatinine ratio 24 (H) 12 - 20 GFR est AA >60 >60 ml/min/1.73m2 GFR est non-AA >60 >60 ml/min/1.73m2 Calcium 8.8 8.5 - 10.1 MG/DL Bilirubin, total 0.3 0.2 - 1.0 MG/DL  
 ALT (SGPT) 32 13 - 56 U/L  
 AST (SGOT) 33 15 - 37 U/L Alk. phosphatase 83 45 - 117 U/L Protein, total 7.9 6.4 - 8.2 g/dL Albumin 4.0 3.4 - 5.0 g/dL Globulin 3.9 2.0 - 4.0 g/dL A-G Ratio 1.0 0.8 - 1.7 LIPASE Collection Time: 04/15/19  6:20 PM  
Result Value Ref Range Lipase 187 73 - 393 U/L  
HCG URINE, QL. - POC Collection Time: 04/15/19  7:09 PM  
Result Value Ref Range Pregnancy test,urine (POC) NEGATIVE  NEG Radiologic Studies: XR ABD ACUTE W 1 V CHEST    (Results Pending) CT Results  (Last 48 hours) None CXR Results  (Last 48 hours) None 6:15 PM 
RADIOLOGY FINDINGS 
 Acute abd  X-ray shows nonspecific bowel gas pattern. Pending review by Radiologist 
Recorded by Shola Sutton PA-C Medical Decision Making I am the first provider for this patient. I reviewed the vital signs, available nursing notes, past medical history, past surgical history, family history and social history. Vital Signs: Reviewed the patient's vital signs. Pulse Oximetry Analysis: 99% on RA Records Reviewed: Nursing Notes and Old Medical Records Was seen at [facility] on April 7 for similar complaints, overall labs reassuring abdominal/pelvis CT w/ IV contrast was obtained and showed Abdomen: 1. Small hepatic hypodensities are likely benign and remains stable. 2. Status post gastric bypass surgery. There is no evidence of a mechanical small or large bowel obstruction. Moderate quantity of solid fecal content is distributed throughout the large bowel. Mesenteric fat stranding and Swirling of mesenteric vessels noted previously appear stable. An internal hernia/nonobstructed volvulus are considerations. There is however is no evidence of acute bowel ischemia at this time. Pelvis: There is no evidence of an intrapelvic abscess. Abdomen: 1. Small hepatic hypodensities are likely benign and remains stable. 2. Status post gastric bypass surgery. There is no evidence of a mechanical small or large bowel obstruction. Moderate quantity of solid fecal content is distributed throughout the large bowel. Mesenteric fat stranding and Swirling of mesenteric vessels noted previously appear stable. An internal hernia/nonobstructed volvulus are considerations. There is however is no evidence of acute bowel ischemia at this time. Pelvis: There is no evidence of an intrapelvic abscess. She was discharged with prescription for Cipro and Flagyl to cover for diverticulosis/diverticulitis. Procedures: 
Procedures ED Course: 5:42 PM Initial assessment performed. The patients presenting problems have been discussed, and they are in agreement with the care plan formulated and outlined with them. I have encouraged them to ask questions as they arise throughout their visit. Discussion: Pt with a long history of chronic abdominal pain presents with generalized abdominal pain that began today. She had a large bowel movement and episode of dry heaving. She is afebrile and nontoxic on exam she has voluntary guarding with generalized abdominal pain. Patient was seen at [facility] a week ago and had an abdominal CT that showed large amount of stool. She was started on Cipro and Flagyl to cover for diverticulosis though no findings specifically seen. Labs in the ED today within normal limits, no leukocytosis normal liver enzymes and lipase, Hemoccult negative. X-ray shows no air-fluid levels or evidence of obstruction. Do not feel repeat CT is indicated at this time. Case discussed with attending who agrees with plan. Informed patient of plan to discharge and have her follow-up with GI specialist.  Patient became agitated. States past small bowel obstructions have felt similar in the past.  Explained her labs were normal x-ray does not show any evidence of obstruction and she did have a bowel movement today which make the diagnosis of a small bowel obstruction less likely. Discussed risks versus benefits of frequent CT scans and increased risk of cancer development. Patient inquired as to who the attending physician on was, I offered to have attending come speak with her she States she  \"just wants to leave\" strict return precautions given, pt offering no questions or complaints. Diagnosis and Disposition DISCHARGE NOTE: 
7:33 PM  
Gualberto King's  results have been reviewed with her. She has been counseled regarding her diagnosis, treatment, and plan.   She verbally conveys understanding and agreement of the signs, symptoms, diagnosis, treatment and prognosis and additionally agrees to follow up as discussed. She also agrees with the care-plan and conveys that all of her questions have been answered. I have also provided discharge instructions for her that include: educational information regarding their diagnosis and treatment, and list of reasons why they would want to return to the ED prior to their follow-up appointment, should her condition change. She has been provided with education for proper emergency department utilization. CLINICAL IMPRESSION: 
 
1. Abdominal pain, generalized 2. Gastroenteritis, acute 3. Chronic abdominal pain PLAN: 
1. D/C Home 2. Discharge Medication List as of 4/15/2019  6:59 PM  
  
START taking these medications Details  
ondansetron hcl (ZOFRAN) 4 mg tablet Take 1 Tab by mouth every eight (8) hours as needed for Nausea. , Print, Disp-15 Tab, R-0  
  
dicyclomine (BENTYL) 10 mg capsule Take 1 Cap by mouth four (4) times daily for 5 days. , Print, Disp-20 Cap, R-0  
  
  
CONTINUE these medications which have NOT CHANGED Details  
cyanocobalamin (VITAMIN B-12) 1,000 mcg sublingual tablet Take 1,000 mcg by mouth daily. , Historical Med Biotin 2,500 mcg cap Take  by mouth., Historical Med  
  
multivit-minerals/folic acid (ADULT MULTIVITAMIN GUMMIES PO) Take  by mouth., Historical Med  
  
calcium citrate 200 mg (950 mg) tablet Take 200 mg by mouth two (2) times a day., Historical Med  
  
cyanocobalamin (VITAMIN B12) 1,000 mcg/mL injection 1,000 mcg by IntraMUSCular route once., Historical Med  
  
famotidine (PEPCID) 40 mg tablet Take 1 Tab by mouth daily. , Normal, Disp-30 Tab, R-0  
  
polyethylene glycol (MIRALAX) 17 gram/dose powder Take 17 g by mouth daily. , Print, Disp-1700 g, R-3 3. Follow-up Information Follow up With Specialties Details Why Contact Info Suyapa Rhodes MD Gastroenterology  call for follow up and recheck  [address] [facility] [address] [place] [address] 
[phone] [facility] EMERGENCY DE[facility] Emergency Medicine  If symptoms worsen [address] [address] 
[phone]

## 2019-04-15 NOTE — ED NOTES
RN attempted to remove pt's IV and prepare for discharge, Pt refused her IV being removed and states she want to talk to provider first. CHIKIS preston and pt report given to [address] [place] [address]

## 2019-04-15 NOTE — ED TRIAGE NOTES
Pt c/o abdominal pain x \" a few hours\". Pt states she was treated for diverticulitis 1 week ago, when asked if she's missed any doses she states \"uh huh\" when asked how many she says \"I don't know\". Pt poor historian. Pt c/o vomiting. LMP 04/01/2019.

## 2019-04-16 ENCOUNTER — TELEPHONE (OUTPATIENT)
Dept: SURGERY | Age: 39
End: 2019-04-16

## 2019-04-17 ENCOUNTER — OFFICE VISIT (OUTPATIENT)
Dept: SURGERY | Age: 39
End: 2019-04-17

## 2019-04-17 VITALS
HEART RATE: 84 BPM | TEMPERATURE: 97.6 F | RESPIRATION RATE: 16 BRPM | HEIGHT: 58 IN | DIASTOLIC BLOOD PRESSURE: 85 MMHG | OXYGEN SATURATION: 100 % | WEIGHT: 129 LBS | BODY MASS INDEX: 27.08 KG/M2 | SYSTOLIC BLOOD PRESSURE: 126 MMHG

## 2019-04-17 DIAGNOSIS — R10.9 ABDOMINAL PAIN, UNSPECIFIED ABDOMINAL LOCATION: Primary | ICD-10-CM

## 2019-04-17 NOTE — PROGRESS NOTES
Surgery Consultation History of Present Illness: The patient is 1 year post gastric bypass with multiple issues in the post-op phase related to stricture formation (requiring 2 EGDs with dilation) as well as abdominal pain (that was worked up at a [address] [place] [address]) that required a diagnostic laparoscopy in October 2018 via this office for lysis of adhesions without the need for any further intervention as there was no evidence of internal hernia. She was seen at a [place] facility for abdominal pain in January and had the following procedure;  
 
Operative Note - Christopher Figueredo MD - 01/20/2019 6:39 PM EST Formatting of this note might be different from the original. 
Operative Note Patient: Alexandra Carey MRN: [id number] Surgery Date:  
Case Tracking Events Date/Time  
Procedure Start 01/20/2019 1839 Procedure Primary Surgeon LAPAROSCOPY, DIAGNOSTIC; LYSIS OF ADHESION Christopher Figueredo MD  
Surgeon(s) and Role: Nathaniel Dumont MD - Primary Other OR Staff/Assistants: 
Surgical Assistant: Chriss aragon Assistants Tasks: Closing and Holding camera Pre-operative Diagnosis: Other specified intestinal obstruction, unspecified whether partial or complete ([address] [K56.699] Post-operative Diagnosis: same as preop diagnosis Anesthesia Type: General Anesthesia Findings: alimentary limb tightly adherent to the liver by adhesive bands, long biliopancreatic limb Complications: No 
EBL: 10 mL Specimens: No specimens No implants or grafts She presents to the office today with issues related to hypoglycemia, hematuria, diarrhea and continued abdominal pain that was recently evaluated by [place] staff on separate ER visits to include two separate CT scans showed the following;  
 
4/7/2019 IMPRESSION Abdomen: 1. Small hepatic hypodensities are likely benign and remains stable. 2. Status post gastric bypass surgery.  There is no evidence of a mechanical small or large bowel obstruction. Moderate quantity of solid fecal content is distributed throughout the large bowel. Mesenteric fat stranding and Swirling of mesenteric vessels noted previously appear stable. An internal hernia/nonobstructed volvulus are considerations. There is however is no evidence of acute bowel ischemia at this time. 2019 IMPRESSION Evaluation is limited by a paucity of abdominal fat. Abdomen: 1. No evidence of obstructing urolithiasis. 2. Subcentimeter hepatic hypodensities are statistically likely cysts. 3. Status post a gastric bypass. 4. Mild anasarca. The patient understands that she has had two laparoscopies by two different surgeons that do not reveal an internal hernia or any other process that would explain her pain. However with the  CT finding with need for further surgical intervention does exist 
 
Past Medical History:  
Diagnosis Date  Constipation  Functional dyspepsia   
 uses OTC meds  GERD (gastroesophageal reflux disease)  History of colon polyps  Morbid obesity ([address]  Morbid obesity with body mass index (BMI) of 40.0 to 49.9 ([address]  Nausea & vomiting 2018  
 SBO (small bowel obstruction) ([facility]) Past Surgical History:  
Procedure Laterality Date  ABDOMEN SURGERY PROC UNLISTED    
 adhensions on liver  COLONOSCOPY X 3  
 DELIVERY     
 X 3  
 HX  SECTION    
 HX COLECTOMY  HX DILATION AND CURETTAGE    HX GASTRIC BYPASS  HX GI    
 gastric bypass  HX OTHER SURGICAL    
 scar resection back of head  HX TUBAL LIGATION    
 HX UROLOGICAL  2001  
 stent [address] Family History Problem Relation Age of Onset  Hypertension Mother  Obesity Mother  Hypertension Father Social History Socioeconomic History  Marital status:  Spouse name: Not on file  Number of children: Not on file  Years of education: Not on file  Highest education level: Not on file Tobacco Use  Smoking status: Never Smoker  Smokeless tobacco: Never Used Substance and Sexual Activity  Alcohol use: Yes Comment: rare  Drug use: No  
 Sexual activity: Yes  
  Partners: Male Birth control/protection: Surgical  
  
Current Outpatient Medications Medication Sig  
 oxyCODONE-acetaminophen (PERCOCET) 5-325 mg per tablet Take 1 Tab by mouth every four (4) hours as needed for Pain.  dicyclomine (BENTYL) 10 mg capsule Take 1 Cap by mouth four (4) times daily for 5 days.  cyanocobalamin (VITAMIN B-12) 1,000 mcg sublingual tablet Take 1,000 mcg by mouth daily.  Biotin 2,500 mcg cap Take  by mouth.  multivit-minerals/folic acid (ADULT MULTIVITAMIN GUMMIES PO) Take  by mouth.  calcium citrate 200 mg (950 mg) tablet Take 200 mg by mouth two (2) times a day. No current facility-administered medications for this visit. Allergies Allergen Reactions  Niacin Other (comments)  Morphine Hives and Itching Review of Symptoms:  
 
General - No history or complaints of unexpected fever, chills, or weight loss Head/Neck - No history or complaints of headache, diplopia, dysphagia, hearing loss Cardiac - No history or complaints of chest pain, palpitations, murmur, or shortness of breath Pulmonary - No history or complaints of shortness of breath, productive cough, hemoptysis Gastrointestinal - As per the HPI above. Genitourinary - No history or complaints of hematuria/dysuria, stress urinary incontinence symptoms, or renal lithiasis Musculoskeletal - No history or complaints of joint pain or muscular weakness Hematologic - No history or complaints of bleeding disorders, blood transfusions, sickle cell anemia Neurologic - No history or complaints of  migraine headaches, seizure activity, syncopal episodes, TIA or stroke Integumentary - No history or complaints of rashes, abnormal nevi, skin cancer Gynecological - No history of heavy menses/abnormal menses Physical Exam:  
 
Physical Examination: General appearance - alert, well appearing, and in no distress and oriented to person, place, and time Mental status - alert, oriented to person, place, and time, normal mood, behavior, speech, dress, motor activity, and thought processes Eyes - pupils equal and reactive, extraocular eye movements intact, sclera anicteric, left eye normal, right eye normal 
Ears - right ear normal, left ear normal 
Nose - normal and patent, no erythema, discharge or polyps Mouth - mucous membranes moist, pharynx normal without lesions Neck - supple, no significant adenopathy Lymphatics - no palpable lymphadenopathy, no hepatosplenomegaly Chest - clear to auscultation, no wheezes, rales or rhonchi, symmetric air entry Heart - normal rate, regular rhythm, normal S1, S2, no murmurs, rubs, clicks or gallops Abdomen - soft, mildly tender with guarding Back exam - full range of motion, no tenderness, palpable spasm or pain on motion Neurological - alert, oriented, normal speech, no focal findings or movement disorder noted Musculoskeletal - no joint tenderness, deformity or swelling Extremities - peripheral pulses normal, no pedal edema, no clubbing or cyanosis Skin - normal coloration and turgor, no rashes, no suspicious skin lesions noted Laboratory / X-Rays:  
  
Lab Results Component Value Date/Time WBC 5.7 04/15/2019 06:20 PM  
 HGB 11.7 (L) 04/15/2019 06:20 PM  
 HCT 36.4 04/15/2019 06:20 PM  
 PLATELET 645 60/96/7695 06:20 PM  
 MCV 89.9 04/15/2019 06:20 PM  
 
Lab Results Component Value Date/Time  Sodium 138 04/15/2019 06:20 PM  
 Potassium 4.1 04/15/2019 06:20 PM  
 Chloride 107 04/15/2019 06:20 PM  
 CO2 24 04/15/2019 06:20 PM  
 Anion gap 7 04/15/2019 06:20 PM  
 Glucose 94 04/15/2019 06:20 PM  
 BUN 16 04/15/2019 06:20 PM  
 Creatinine 0.68 04/15/2019 06:20 PM  
 BUN/Creatinine ratio 24 (H) 04/15/2019 06:20 PM  
 GFR est AA >60 04/15/2019 06:20 PM  
 GFR est non-AA >60 04/15/2019 06:20 PM  
 Calcium 8.8 04/15/2019 06:20 PM  
 Bilirubin, total 0.3 04/15/2019 06:20 PM  
 AST (SGOT) 33 04/15/2019 06:20 PM  
 Alk. phosphatase 83 04/15/2019 06:20 PM  
 Protein, total 7.9 04/15/2019 06:20 PM  
 Albumin 4.0 04/15/2019 06:20 PM  
 Globulin 3.9 04/15/2019 06:20 PM  
 A-G Ratio 1.0 04/15/2019 06:20 PM  
 ALT (SGPT) 32 04/15/2019 06:20 PM  
 
Lab Results Component Value Date/Time Iron 55 10/12/2018 06:38 PM  
 Ferritin 15 10/12/2018 06:38 PM  
 
Lab Results Component Value Date/Time Folate >20.0 (H) 10/12/2018 06:38 PM  
 
Lab Results Component Value Date/Time Vitamin D 25-Hydroxy 31.8 10/12/2018 06:41 PM  
   
 
 
All labs and x-rays reviewed from their ER visit and outside evaluations. It has all been scanned under the media tab or is included within [facility] or Care everywhere. Assessment:  
 
Alexandra Carey is a prior laparoscopic gastric bypass surgery 
 patient who underwent their weight loss surgical procedure procedure approximately 1 year ago. They now have abdominal pain that is persistent in nature with recent abnormal  as per her HPI Her hypoglycemia issues are likely related to decreased PO intake that can be corrected with better diet Her hematuria needs to be evaluated by a urologist as she has no evidence of renal stones on several recent CT scans Recommendations: The diagnosis was discussed with the patient and evaluation and treatment plans outlined. Schedule for diagnostic laparoscopy. Signed By: Brandt Whitaker MD   
 April 17, 2019

## 2019-04-17 NOTE — H&P (VIEW-ONLY)
Surgery Consultation History of Present Illness: The patient is 1 year post gastric bypass with multiple issues in the post-op phase related to stricture formation (requiring 2 EGDs with dilation) as well as abdominal pain (that was worked up at a [address] [place] [address]) that required a diagnostic laparoscopy in October 2018 via this office for lysis of adhesions without the need for any further intervention as there was no evidence of internal hernia. She was seen at a [facility] facility for abdominal pain in January and had the following procedure;  
 
Operative Note - Princess Oscar MD - 01/20/2019 6:39 PM EST Formatting of this note might be different from the original. 
Operative Note Patient: June Gonzalez MRN: [id number] Surgery Date:  
Case Tracking Events Date/Time  
Procedure Start 01/20/2019 1839 Procedure Primary Surgeon LAPAROSCOPY, DIAGNOSTIC; LYSIS OF ADHESION Princess Oscar MD  
Surgeon(s) and Role: Joanie Ding MD - Primary Other OR Staff/Assistants: 
Surgical Assistant: Mickey aragon Assistants Tasks: Closing and Holding camera Pre-operative Diagnosis: Other specified intestinal obstruction, unspecified whether partial or complete ([address] [K56.699] Post-operative Diagnosis: same as preop diagnosis Anesthesia Type: General Anesthesia Findings: alimentary limb tightly adherent to the liver by adhesive bands, long biliopancreatic limb Complications: No 
EBL: 10 mL Specimens: No specimens No implants or grafts She presents to the office today with issues related to hypoglycemia, hematuria, diarrhea and continued abdominal pain that was recently evaluated by [facility] staff on separate ER visits to include two separate CT scans showed the following;  
 
4/7/2019 IMPRESSION Abdomen: 1. Small hepatic hypodensities are likely benign and remains stable. 2. Status post gastric bypass surgery.  There is no evidence of a mechanical small or large bowel obstruction. Moderate quantity of solid fecal content is distributed throughout the large bowel. Mesenteric fat stranding and Swirling of mesenteric vessels noted previously appear stable. An internal hernia/nonobstructed volvulus are considerations. There is however is no evidence of acute bowel ischemia at this time. 2019 IMPRESSION Evaluation is limited by a paucity of abdominal fat. Abdomen: 1. No evidence of obstructing urolithiasis. 2. Subcentimeter hepatic hypodensities are statistically likely cysts. 3. Status post a gastric bypass. 4. Mild anasarca. The patient understands that she has had two laparoscopies by two different surgeons that do not reveal an internal hernia or any other process that would explain her pain. However with the  CT finding with need for further surgical intervention does exist 
 
Past Medical History:  
Diagnosis Date  Constipation  Functional dyspepsia   
 uses OTC meds  GERD (gastroesophageal reflux disease)  History of colon polyps  Morbid obesity ([address]  Morbid obesity with body mass index (BMI) of 40.0 to 49.9 ([address]  Nausea & vomiting 2018  
 SBO (small bowel obstruction) (HCC) Past Surgical History:  
Procedure Laterality Date  ABDOMEN SURGERY PROC UNLISTED    
 adhensions on liver  COLONOSCOPY X 3  
 DELIVERY     
 X 3  
 HX  SECTION    
 HX COLECTOMY  HX DILATION AND CURETTAGE    HX GASTRIC BYPASS  HX GI    
 gastric bypass  HX OTHER SURGICAL    
 scar resection back of head  HX TUBAL LIGATION    
 HX UROLOGICAL  2001  
 stent [address] Family History Problem Relation Age of Onset  Hypertension Mother  Obesity Mother  Hypertension Father Social History Socioeconomic History  Marital status:  Spouse name: Not on file  Number of children: Not on file  Years of education: Not on file  Highest education level: Not on file Tobacco Use  Smoking status: Never Smoker  Smokeless tobacco: Never Used Substance and Sexual Activity  Alcohol use: Yes Comment: rare  Drug use: No  
 Sexual activity: Yes  
  Partners: Male Birth control/protection: Surgical  
  
Current Outpatient Medications Medication Sig  
 oxyCODONE-acetaminophen (PERCOCET) 5-325 mg per tablet Take 1 Tab by mouth every four (4) hours as needed for Pain.  dicyclomine (BENTYL) 10 mg capsule Take 1 Cap by mouth four (4) times daily for 5 days.  cyanocobalamin (VITAMIN B-12) 1,000 mcg sublingual tablet Take 1,000 mcg by mouth daily.  Biotin 2,500 mcg cap Take  by mouth.  multivit-minerals/folic acid (ADULT MULTIVITAMIN GUMMIES PO) Take  by mouth.  calcium citrate 200 mg (950 mg) tablet Take 200 mg by mouth two (2) times a day. No current facility-administered medications for this visit. Allergies Allergen Reactions  Niacin Other (comments)  Morphine Hives and Itching Review of Symptoms:  
 
General - No history or complaints of unexpected fever, chills, or weight loss Head/Neck - No history or complaints of headache, diplopia, dysphagia, hearing loss Cardiac - No history or complaints of chest pain, palpitations, murmur, or shortness of breath Pulmonary - No history or complaints of shortness of breath, productive cough, hemoptysis Gastrointestinal - As per the HPI above. Genitourinary - No history or complaints of hematuria/dysuria, stress urinary incontinence symptoms, or renal lithiasis Musculoskeletal - No history or complaints of joint pain or muscular weakness Hematologic - No history or complaints of bleeding disorders, blood transfusions, sickle cell anemia Neurologic - No history or complaints of  migraine headaches, seizure activity, syncopal episodes, TIA or stroke Integumentary - No history or complaints of rashes, abnormal nevi, skin cancer Gynecological - No history of heavy menses/abnormal menses Physical Exam:  
 
Physical Examination: General appearance - alert, well appearing, and in no distress and oriented to person, place, and time Mental status - alert, oriented to person, place, and time, normal mood, behavior, speech, dress, motor activity, and thought processes Eyes - pupils equal and reactive, extraocular eye movements intact, sclera anicteric, left eye normal, right eye normal 
Ears - right ear normal, left ear normal 
Nose - normal and patent, no erythema, discharge or polyps Mouth - mucous membranes moist, pharynx normal without lesions Neck - supple, no significant adenopathy Lymphatics - no palpable lymphadenopathy, no hepatosplenomegaly Chest - clear to auscultation, no wheezes, rales or rhonchi, symmetric air entry Heart - normal rate, regular rhythm, normal S1, S2, no murmurs, rubs, clicks or gallops Abdomen - soft, mildly tender with guarding Back exam - full range of motion, no tenderness, palpable spasm or pain on motion Neurological - alert, oriented, normal speech, no focal findings or movement disorder noted Musculoskeletal - no joint tenderness, deformity or swelling Extremities - peripheral pulses normal, no pedal edema, no clubbing or cyanosis Skin - normal coloration and turgor, no rashes, no suspicious skin lesions noted Laboratory / X-Rays:  
  
Lab Results Component Value Date/Time WBC 5.7 04/15/2019 06:20 PM  
 HGB 11.7 (L) 04/15/2019 06:20 PM  
 HCT 36.4 04/15/2019 06:20 PM  
 PLATELET 212 72/08/3836 06:20 PM  
 MCV 89.9 04/15/2019 06:20 PM  
 
Lab Results Component Value Date/Time  Sodium 138 04/15/2019 06:20 PM  
 Potassium 4.1 04/15/2019 06:20 PM  
 Chloride 107 04/15/2019 06:20 PM  
 CO2 24 04/15/2019 06:20 PM  
 Anion gap 7 04/15/2019 06:20 PM  
 Glucose 94 04/15/2019 06:20 PM  
 BUN 16 04/15/2019 06:20 PM  
 Creatinine 0.68 04/15/2019 06:20 PM  
 BUN/Creatinine ratio 24 (H) 04/15/2019 06:20 PM  
 GFR est AA >60 04/15/2019 06:20 PM  
 GFR est non-AA >60 04/15/2019 06:20 PM  
 Calcium 8.8 04/15/2019 06:20 PM  
 Bilirubin, total 0.3 04/15/2019 06:20 PM  
 AST (SGOT) 33 04/15/2019 06:20 PM  
 Alk. phosphatase 83 04/15/2019 06:20 PM  
 Protein, total 7.9 04/15/2019 06:20 PM  
 Albumin 4.0 04/15/2019 06:20 PM  
 Globulin 3.9 04/15/2019 06:20 PM  
 A-G Ratio 1.0 04/15/2019 06:20 PM  
 ALT (SGPT) 32 04/15/2019 06:20 PM  
 
Lab Results Component Value Date/Time Iron 55 10/12/2018 06:38 PM  
 Ferritin 15 10/12/2018 06:38 PM  
 
Lab Results Component Value Date/Time Folate >20.0 (H) 10/12/2018 06:38 PM  
 
Lab Results Component Value Date/Time Vitamin D 25-Hydroxy 31.8 10/12/2018 06:41 PM  
   
 
 
All labs and x-rays reviewed from their ER visit and outside evaluations. It has all been scanned under the media tab or is included within [facility] or Care everywhere. Assessment:  
 
Olya Cannon is a prior laparoscopic gastric bypass surgery 
 patient who underwent their weight loss surgical procedure procedure approximately 1 year ago. They now have abdominal pain that is persistent in nature with recent abnormal  as per her HPI Her hypoglycemia issues are likely related to decreased PO intake that can be corrected with better diet Her hematuria needs to be evaluated by a urologist as she has no evidence of renal stones on several recent CT scans Recommendations: The diagnosis was discussed with the patient and evaluation and treatment plans outlined. Schedule for diagnostic laparoscopy. Signed By: Lolis Arriaga MD   
 April 17, 2019

## 2019-04-17 NOTE — PROGRESS NOTES
Pt stated that she had a fainting spell Sunday, and again yesterday. Pt stated that she notice blood in her urine and stool.

## 2019-04-18 ENCOUNTER — HOSPITAL ENCOUNTER (OUTPATIENT)
Age: 39
Setting detail: OUTPATIENT SURGERY
Discharge: HOME | End: 2019-04-18
Attending: SPECIALIST | Admitting: SPECIALIST
Payer: OTHER GOVERNMENT

## 2019-04-18 ENCOUNTER — ANESTHESIA (OUTPATIENT)
Dept: SURGERY | Age: 39
End: 2019-04-18
Payer: OTHER GOVERNMENT

## 2019-04-18 ENCOUNTER — NURSE NAVIGATOR (OUTPATIENT)
Dept: SURGERY | Age: 39
End: 2019-04-18

## 2019-04-18 ENCOUNTER — ANESTHESIA EVENT (OUTPATIENT)
Dept: SURGERY | Age: 39
End: 2019-04-18
Payer: OTHER GOVERNMENT

## 2019-04-18 VITALS
RESPIRATION RATE: 18 BRPM | HEIGHT: 58 IN | WEIGHT: 127.44 LBS | TEMPERATURE: 97.3 F | SYSTOLIC BLOOD PRESSURE: 116 MMHG | HEART RATE: 60 BPM | OXYGEN SATURATION: 100 % | BODY MASS INDEX: 26.75 KG/M2 | DIASTOLIC BLOOD PRESSURE: 67 MMHG

## 2019-04-18 DIAGNOSIS — G89.18 POST-OP PAIN: Primary | ICD-10-CM

## 2019-04-18 LAB — HCG UR QL: NEGATIVE

## 2019-04-18 PROCEDURE — 77030002933 HC SUT MCRYL J&J -A: Performed by: SPECIALIST

## 2019-04-18 PROCEDURE — 74011000250 HC RX REV CODE- 250: Performed by: SPECIALIST

## 2019-04-18 PROCEDURE — 77030012893: Performed by: SPECIALIST

## 2019-04-18 PROCEDURE — 74011250636 HC RX REV CODE- 250/636: Performed by: SPECIALIST

## 2019-04-18 PROCEDURE — 74011250636 HC RX REV CODE- 250/636: Performed by: ANESTHESIOLOGY

## 2019-04-18 PROCEDURE — 77030008602 HC TRCR ENDOSC EPATH J&J -B: Performed by: SPECIALIST

## 2019-04-18 PROCEDURE — 76210000001 HC OR PH I REC 2.5 TO 3 HR: Performed by: SPECIALIST

## 2019-04-18 PROCEDURE — 81025 URINE PREGNANCY TEST: CPT

## 2019-04-18 PROCEDURE — 77030003580 HC NDL INSUF VERES J&J -B: Performed by: SPECIALIST

## 2019-04-18 PROCEDURE — 77030016151 HC PROTCTR LNS DFOG COVD -B: Performed by: SPECIALIST

## 2019-04-18 PROCEDURE — 74011000250 HC RX REV CODE- 250

## 2019-04-18 PROCEDURE — 77030032490 HC SLV COMPR SCD KNE COVD -B: Performed by: SPECIALIST

## 2019-04-18 PROCEDURE — 76210000021 HC REC RM PH II 0.5 TO 1 HR: Performed by: SPECIALIST

## 2019-04-18 PROCEDURE — 74011250636 HC RX REV CODE- 250/636

## 2019-04-18 PROCEDURE — 76060000033 HC ANESTHESIA 1 TO 1.5 HR: Performed by: SPECIALIST

## 2019-04-18 PROCEDURE — 77030020782 HC GWN BAIR PAWS FLX 3M -B: Performed by: SPECIALIST

## 2019-04-18 PROCEDURE — 77030008603 HC TRCR ENDOSC EPATH J&J -C: Performed by: SPECIALIST

## 2019-04-18 PROCEDURE — 77030018836 HC SOL IRR NACL ICUM -A: Performed by: SPECIALIST

## 2019-04-18 PROCEDURE — 76010000149 HC OR TIME 1 TO 1.5 HR: Performed by: SPECIALIST

## 2019-04-18 RX ORDER — PROPOFOL 10 MG/ML
INJECTION, EMULSION INTRAVENOUS AS NEEDED
Status: DISCONTINUED | OUTPATIENT
Start: 2019-04-18 | End: 2019-04-18 | Stop reason: HOSPADM

## 2019-04-18 RX ORDER — GLYCOPYRROLATE 0.2 MG/ML
INJECTION INTRAMUSCULAR; INTRAVENOUS AS NEEDED
Status: DISCONTINUED | OUTPATIENT
Start: 2019-04-18 | End: 2019-04-18 | Stop reason: HOSPADM

## 2019-04-18 RX ORDER — DEXTROSE 50 % IN WATER (D50W) INTRAVENOUS SYRINGE
25-50 AS NEEDED
Status: DISCONTINUED | OUTPATIENT
Start: 2019-04-18 | End: 2019-04-18 | Stop reason: HOSPADM

## 2019-04-18 RX ORDER — CEFAZOLIN SODIUM 2 G/50ML
2 SOLUTION INTRAVENOUS ONCE
Status: COMPLETED | OUTPATIENT
Start: 2019-04-18 | End: 2019-04-18

## 2019-04-18 RX ORDER — SODIUM CHLORIDE, SODIUM LACTATE, POTASSIUM CHLORIDE, CALCIUM CHLORIDE 600; 310; 30; 20 MG/100ML; MG/100ML; MG/100ML; MG/100ML
50 INJECTION, SOLUTION INTRAVENOUS CONTINUOUS
Status: DISCONTINUED | OUTPATIENT
Start: 2019-04-18 | End: 2019-04-18 | Stop reason: HOSPADM

## 2019-04-18 RX ORDER — ONDANSETRON 2 MG/ML
INJECTION INTRAMUSCULAR; INTRAVENOUS AS NEEDED
Status: DISCONTINUED | OUTPATIENT
Start: 2019-04-18 | End: 2019-04-18 | Stop reason: HOSPADM

## 2019-04-18 RX ORDER — INSULIN LISPRO 100 [IU]/ML
INJECTION, SOLUTION INTRAVENOUS; SUBCUTANEOUS ONCE
Status: DISCONTINUED | OUTPATIENT
Start: 2019-04-18 | End: 2019-04-18 | Stop reason: HOSPADM

## 2019-04-18 RX ORDER — ONDANSETRON 2 MG/ML
4 INJECTION INTRAMUSCULAR; INTRAVENOUS ONCE
Status: DISCONTINUED | OUTPATIENT
Start: 2019-04-18 | End: 2019-04-18 | Stop reason: HOSPADM

## 2019-04-18 RX ORDER — LIDOCAINE HYDROCHLORIDE 20 MG/ML
INJECTION, SOLUTION EPIDURAL; INFILTRATION; INTRACAUDAL; PERINEURAL AS NEEDED
Status: DISCONTINUED | OUTPATIENT
Start: 2019-04-18 | End: 2019-04-18 | Stop reason: HOSPADM

## 2019-04-18 RX ORDER — MIDAZOLAM HYDROCHLORIDE 1 MG/ML
INJECTION, SOLUTION INTRAMUSCULAR; INTRAVENOUS AS NEEDED
Status: DISCONTINUED | OUTPATIENT
Start: 2019-04-18 | End: 2019-04-18 | Stop reason: HOSPADM

## 2019-04-18 RX ORDER — NALOXONE HYDROCHLORIDE 0.4 MG/ML
0.1 INJECTION, SOLUTION INTRAMUSCULAR; INTRAVENOUS; SUBCUTANEOUS
Status: DISCONTINUED | OUTPATIENT
Start: 2019-04-18 | End: 2019-04-18 | Stop reason: HOSPADM

## 2019-04-18 RX ORDER — NEOSTIGMINE METHYLSULFATE 5 MG/5 ML
SYRINGE (ML) INTRAVENOUS AS NEEDED
Status: DISCONTINUED | OUTPATIENT
Start: 2019-04-18 | End: 2019-04-18 | Stop reason: HOSPADM

## 2019-04-18 RX ORDER — KETOROLAC TROMETHAMINE 30 MG/ML
INJECTION, SOLUTION INTRAMUSCULAR; INTRAVENOUS AS NEEDED
Status: DISCONTINUED | OUTPATIENT
Start: 2019-04-18 | End: 2019-04-18 | Stop reason: HOSPADM

## 2019-04-18 RX ORDER — OXYCODONE AND ACETAMINOPHEN 5; 325 MG/1; MG/1
1 TABLET ORAL AS NEEDED
Status: DISCONTINUED | OUTPATIENT
Start: 2019-04-18 | End: 2019-04-18 | Stop reason: HOSPADM

## 2019-04-18 RX ORDER — MAGNESIUM SULFATE 100 %
4 CRYSTALS MISCELLANEOUS AS NEEDED
Status: DISCONTINUED | OUTPATIENT
Start: 2019-04-18 | End: 2019-04-18 | Stop reason: HOSPADM

## 2019-04-18 RX ORDER — PHENYLEPHRINE HCL IN 0.9% NACL 1 MG/10 ML
SYRINGE (ML) INTRAVENOUS AS NEEDED
Status: DISCONTINUED | OUTPATIENT
Start: 2019-04-18 | End: 2019-04-18 | Stop reason: HOSPADM

## 2019-04-18 RX ORDER — ACETAMINOPHEN 10 MG/ML
1000 INJECTION, SOLUTION INTRAVENOUS ONCE
Status: COMPLETED | OUTPATIENT
Start: 2019-04-18 | End: 2019-04-18

## 2019-04-18 RX ORDER — ROCURONIUM BROMIDE 10 MG/ML
INJECTION, SOLUTION INTRAVENOUS AS NEEDED
Status: DISCONTINUED | OUTPATIENT
Start: 2019-04-18 | End: 2019-04-18 | Stop reason: HOSPADM

## 2019-04-18 RX ORDER — BUPIVACAINE HYDROCHLORIDE AND EPINEPHRINE 5; 5 MG/ML; UG/ML
INJECTION, SOLUTION EPIDURAL; INTRACAUDAL; PERINEURAL AS NEEDED
Status: DISCONTINUED | OUTPATIENT
Start: 2019-04-18 | End: 2019-04-18 | Stop reason: HOSPADM

## 2019-04-18 RX ORDER — OXYCODONE AND ACETAMINOPHEN 5; 325 MG/1; MG/1
1 TABLET ORAL
Qty: 30 TAB | Refills: 0 | Status: SHIPPED | OUTPATIENT
Start: 2019-04-18 | End: 2019-04-21

## 2019-04-18 RX ORDER — FENTANYL CITRATE 50 UG/ML
INJECTION, SOLUTION INTRAMUSCULAR; INTRAVENOUS AS NEEDED
Status: DISCONTINUED | OUTPATIENT
Start: 2019-04-18 | End: 2019-04-18 | Stop reason: HOSPADM

## 2019-04-18 RX ORDER — HYDROMORPHONE HYDROCHLORIDE 2 MG/ML
0.5 INJECTION, SOLUTION INTRAMUSCULAR; INTRAVENOUS; SUBCUTANEOUS
Status: DISCONTINUED | OUTPATIENT
Start: 2019-04-18 | End: 2019-04-18 | Stop reason: HOSPADM

## 2019-04-18 RX ORDER — FENTANYL CITRATE 50 UG/ML
25 INJECTION, SOLUTION INTRAMUSCULAR; INTRAVENOUS
Status: DISCONTINUED | OUTPATIENT
Start: 2019-04-18 | End: 2019-04-18 | Stop reason: HOSPADM

## 2019-04-18 RX ORDER — SODIUM CHLORIDE, SODIUM LACTATE, POTASSIUM CHLORIDE, CALCIUM CHLORIDE 600; 310; 30; 20 MG/100ML; MG/100ML; MG/100ML; MG/100ML
125 INJECTION, SOLUTION INTRAVENOUS CONTINUOUS
Status: DISCONTINUED | OUTPATIENT
Start: 2019-04-18 | End: 2019-04-18 | Stop reason: HOSPADM

## 2019-04-18 RX ADMIN — PROPOFOL 120 MG: 10 INJECTION, EMULSION INTRAVENOUS at 09:48

## 2019-04-18 RX ADMIN — KETOROLAC TROMETHAMINE 15 MG: 30 INJECTION, SOLUTION INTRAMUSCULAR; INTRAVENOUS at 10:44

## 2019-04-18 RX ADMIN — ROCURONIUM BROMIDE 50 MG: 10 INJECTION, SOLUTION INTRAVENOUS at 09:48

## 2019-04-18 RX ADMIN — FENTANYL CITRATE 100 MCG: 50 INJECTION, SOLUTION INTRAMUSCULAR; INTRAVENOUS at 09:47

## 2019-04-18 RX ADMIN — LIDOCAINE HYDROCHLORIDE 60 MG: 20 INJECTION, SOLUTION EPIDURAL; INFILTRATION; INTRACAUDAL; PERINEURAL at 09:48

## 2019-04-18 RX ADMIN — ONDANSETRON 4 MG: 2 INJECTION INTRAMUSCULAR; INTRAVENOUS at 10:40

## 2019-04-18 RX ADMIN — SODIUM CHLORIDE, SODIUM LACTATE, POTASSIUM CHLORIDE, AND CALCIUM CHLORIDE 125 ML/HR: 600; 310; 30; 20 INJECTION, SOLUTION INTRAVENOUS at 08:50

## 2019-04-18 RX ADMIN — SODIUM CHLORIDE, SODIUM LACTATE, POTASSIUM CHLORIDE, AND CALCIUM CHLORIDE: 600; 310; 30; 20 INJECTION, SOLUTION INTRAVENOUS at 10:45

## 2019-04-18 RX ADMIN — Medication 1 MG: at 11:03

## 2019-04-18 RX ADMIN — FENTANYL CITRATE 50 MCG: 50 INJECTION, SOLUTION INTRAMUSCULAR; INTRAVENOUS at 10:56

## 2019-04-18 RX ADMIN — HYDROMORPHONE HYDROCHLORIDE 0.5 MG: 2 INJECTION, SOLUTION INTRAMUSCULAR; INTRAVENOUS; SUBCUTANEOUS at 12:36

## 2019-04-18 RX ADMIN — MIDAZOLAM HYDROCHLORIDE 2 MG: 1 INJECTION, SOLUTION INTRAMUSCULAR; INTRAVENOUS at 09:44

## 2019-04-18 RX ADMIN — GLYCOPYRROLATE 0.4 MG: 0.2 INJECTION INTRAMUSCULAR; INTRAVENOUS at 10:49

## 2019-04-18 RX ADMIN — CEFAZOLIN SODIUM 2 G: 2 SOLUTION INTRAVENOUS at 09:53

## 2019-04-18 RX ADMIN — Medication 100 MCG: at 10:08

## 2019-04-18 RX ADMIN — Medication 2 MG: at 10:49

## 2019-04-18 RX ADMIN — ACETAMINOPHEN 870 MG: 10 INJECTION, SOLUTION INTRAVENOUS at 09:57

## 2019-04-18 RX ADMIN — FENTANYL CITRATE 50 MCG: 50 INJECTION, SOLUTION INTRAMUSCULAR; INTRAVENOUS at 11:05

## 2019-04-18 NOTE — ANESTHESIA POSTPROCEDURE EVALUATION
Post-Anesthesia Evaluation and Assessment Cardiovascular Function/Vital Signs Visit Vitals /72 Pulse (!) 59 Temp 36.9 °C (98.5 °F) Resp 17 Ht 4' 10\" (1.473 m) Wt 57.8 kg (127 lb 7 oz) SpO2 100% BMI 26.63 kg/m² Patient is status post Procedure(s): DIAGNOSTIC LAPAROSCOPY, LYSIS OF ADHESIONS, REDUCTION AND REPAIR OF INTERNAL HERNIA, ENDOSCOPY. Nausea/Vomiting: Controlled. Postoperative hydration reviewed and adequate. Pain: 
Pain Scale 1: FLACC (04/18/19 1243) Pain Intensity 1: 0 (04/18/19 1243) Managed. Neurological Status:  
Neuro (WDL): Exceptions to WDL (04/18/19 1107) At baseline. Mental Status and Level of Consciousness: Baseline and stable. Pulmonary Status:  
O2 Device: Nasal cannula (04/18/19 1125) Adequate oxygenation and airway patent. Complications related to anesthesia: None Post-anesthesia assessment completed. No concerns. Patient has met all discharge requirements.  
 
Signed By: Mona Hernandez MD

## 2019-04-18 NOTE — PERIOP NOTES
Discharge instructions completed - opportunity for questions- pt and  aware of duplicate for Percocet - understands to use only one prescription

## 2019-04-18 NOTE — BRIEF OP NOTE
BRIEF OPERATIVE NOTE    Date of Procedure: 4/18/2019   Preoperative Diagnosis: ABDOMINAL PAIN, POST BARIATRIC SURGERY  Postoperative Diagnosis: ABDOMINAL PAIN, POST BARIATRIC SURGERY    Procedure(s):  DIAGNOSTIC LAPAROSCOPY, LYSIS OF ADHESIONS, REDUCTION AND REPAIR OF INTERNAL HERNIA, ENDOSCOPY  Surgeon(s) and Role:     * Tino Santos MD - Primary         Surgical Assistant: Melissa Maza    Surgical Staff:  Circ-1: Twin Lozano RN  Physician Assistant: CHIKIS Castillo  Scrub Tech-1: Rick Mejía  Scrub Tech-2: Gagan Lopez  Event Time In Time Out   Incision Start 1004    Incision Close 1053      Anesthesia: General   Estimated Blood Loss: none  Specimens: * No specimens in log *   Findings: internal hernia at Helms's defect   Complications: none  Implants: * No implants in log *

## 2019-04-18 NOTE — DISCHARGE INSTRUCTIONS
Patient Education        Laparoscopy: What to Expect at Home  Your Recovery  After laparoscopic surgery, you are likely to have pain for the next several days. You may also feel like you have the flu. You may have a low fever and feel tired and sick to your stomach. This is common. You should feel better after 1 to 2 weeks. This care sheet gives you a general idea about how long it will take for you to recover. But each person recovers at a different pace. Follow the steps below to get better as quickly as possible. How can you care for yourself at home? Activity    · Rest when you feel tired. Getting enough sleep will help you recover.     · Try to walk each day. Start by walking a little more than you did the day before. Bit by bit, increase the amount you walk. Walking boosts blood flow and helps prevent pneumonia and constipation.     · Avoid strenuous activities, such as bicycle riding, jogging, weight lifting, or aerobic exercise, until your doctor says it is okay.     · Avoid lifting anything that would make you strain. This may include a child, heavy grocery bags and milk containers, a heavy briefcase or backpack, cat litter or dog food bags, or a vacuum .     · You may also have pain in your shoulder. The pain usually lasts about 1 or 2 days.     · You may drive when you are no longer taking pain medicine and can quickly move your foot from the gas pedal to the brake. You must also be able to sit comfortably for a long period of time, even if you do not plan to go far. You might get caught in traffic.     · You will probably need to take 2 weeks off from work. It depends on the type of work you do and how you feel.     · You may shower 24 to 48 hours after surgery, if your doctor okays it. Pat the cut (incision) dry. Do not take a bath for the first 2 weeks, or until your doctor tells you it is okay.    Diet    · If your stomach is upset, try bland, low-fat foods such as plain rice, broiled chicken, toast, and yogurt.     · Drink plenty of fluids (enough so that your urine is light yellow or clear like water) to prevent dehydration. Choose water and other caffeine-free clear liquids. If you have kidney, heart, or liver disease and have to limit fluids, talk with your doctor before you increase the amount of fluids you drink.     · You may notice that your bowel movements are not regular right after your surgery. This is common. Avoid constipation and straining with bowel movements. You may want to take a fiber supplement every day. If you have not had a bowel movement after a couple of days, ask your doctor about taking a mild laxative. Medicines    · Your doctor will tell you if and when you can restart your medicines. He or she will also give you instructions about taking any new medicines.     · If you take blood thinners, such as warfarin (Coumadin), clopidogrel (Plavix), or aspirin, be sure to talk to your doctor. He or she will tell you if and when to start taking those medicines again. Make sure that you understand exactly what your doctor wants you to do.     · Take pain medicines exactly as directed. ? If the doctor gave you a prescription medicine for pain, take it as prescribed. ? If you are not taking a prescription pain medicine, ask your doctor if you can take an over-the-counter medicine.     · If your doctor prescribed antibiotics, take them as directed. Do not stop taking them just because you feel better. You need to take the full course of antibiotics.     · If you think your pain medicine is making you sick to your stomach:  ? Take your medicine after meals (unless your doctor has told you not to). ? Ask your doctor for a different pain medicine. Incision care    · If you have strips of tape on the incision, leave the tape on for a week or until it falls off.     · Wash the area daily with warm, soapy water and pat it dry.  Don't use hydrogen peroxide or alcohol, which can slow healing. You may cover the area with a gauze bandage if it weeps or rubs against clothing. Change the bandage every day. Follow-up care is a key part of your treatment and safety. Be sure to make and go to all appointments, and call your doctor if you are having problems. It's also a good idea to know your test results and keep a list of the medicines you take. When should you call for help? Call 911 anytime you think you may need emergency care. For example, call if:    · You passed out (lost consciousness).     · You are short of breath.    Call your doctor now or seek immediate medical care if:    · You have pain that does not get better after you take pain medicine.     · You have loose stitches, or your incision comes open.     · Bright red blood has soaked through your bandage.     · You have signs of infection, such as:  ? Increased pain, swelling, warmth, or redness. ? Red streaks leading from the incision. ? Pus draining from the incision. ? A fever.     · You are sick to your stomach or cannot keep fluids down.     · You have signs of a blood clot in your leg (called a deep vein thrombosis), such as:  ? Pain in your calf, back of the knee, thigh, or groin. ? Redness and swelling in your leg or groin.     · You cannot pass stools or gas.    Watch closely for any changes in your health, and be sure to contact your doctor if you have any problems. Where can you learn more? Go to http://jorge-prisca.info/. Enter J617 in the search box to learn more about \"Laparoscopy: What to Expect at Home. \"  Current as of: March 27, 2018  Content Version: 11.9  © 0640-6226 [employer]. Care instructions adapted under license by [employer] (which disclaims liability or warranty for this information).  If you have questions about a medical condition or this instruction, always ask your healthcare professional. Aj Guzman disclaims any warranty or liability for your use of this information. Resume pre hospital diet  Drink plenty of fluids  Take prescription as directed  Ambulate in house  Shower tomorrow  Follow up with Dr. Pratima Fox in 2 weeks        DISCHARGE SUMMARY from Nurse    PATIENT INSTRUCTIONS:    After general anesthesia or intravenous sedation, for 24 hours or while taking prescription Narcotics:  · Limit your activities  · Do not drive and operate hazardous machinery  · Do not make important personal or business decisions  · Do  not drink alcoholic beverages  · If you have not urinated within 8 hours after discharge, please contact your surgeon on call. Report the following to your surgeon:  · Excessive pain, swelling, redness or odor of or around the surgical area  · Temperature over 100.5  · Nausea and vomiting lasting longer than 4 hours or if unable to take medications  · Any signs of decreased circulation or nerve impairment to extremity: change in color, persistent  numbness, tingling, coldness or increase pain  · Any questions    What to do at Home:  Recommended activity: Ambulate in house, No driving while on analgesics and No heavy lifting until advised     If you experience any of the following symptoms fever, chills, uncontrollable pain , active bleeding, please follow up with Dr. Pratima Fox. *  Please give a list of your current medications to your Primary Care Provider. *  Please update this list whenever your medications are discontinued, doses are      changed, or new medications (including over-the-counter products) are added. *  Please carry medication information at all times in case of emergency situations. These are general instructions for a healthy lifestyle:    No smoking/ No tobacco products/ Avoid exposure to second hand smoke  Surgeon General's Warning:  Quitting smoking now greatly reduces serious risk to your health.     Obesity, smoking, and sedentary lifestyle greatly increases your risk for illness    A healthy diet, regular physical exercise & weight monitoring are important for maintaining a healthy lifestyle    You may be retaining fluid if you have a history of heart failure or if you experience any of the following symptoms:  Weight gain of 3 pounds or more overnight or 5 pounds in a week, increased swelling in our hands or feet or shortness of breath while lying flat in bed. Please call your doctor as soon as you notice any of these symptoms; do not wait until your next office visit. Recognize signs and symptoms of STROKE:    F-face looks uneven    A-arms unable to move or move unevenly    S-speech slurred or non-existent    T-time-call 911 as soon as signs and symptoms begin-DO NOT go       Back to bed or wait to see if you get better-TIME IS BRAIN. Warning Signs of HEART ATTACK     Call 911 if you have these symptoms:   Chest discomfort. Most heart attacks involve discomfort in the center of the chest that lasts more than a few minutes, or that goes away and comes back. It can feel like uncomfortable pressure, squeezing, fullness, or pain.  Discomfort in other areas of the upper body. Symptoms can include pain or discomfort in one or both arms, the back, neck, jaw, or stomach.  Shortness of breath with or without chest discomfort.  Other signs may include breaking out in a cold sweat, nausea, or lightheadedness. Don't wait more than five minutes to call 911 - MINUTES MATTER! Fast action can save your life. Calling 911 is almost always the fastest way to get lifesaving treatment. Emergency Medical Services staff can begin treatment when they arrive -- up to an hour sooner than if someone gets to the hospital by car. Patient armband removed and shredded    The discharge information has been reviewed with the patient and caregiver. The patient and caregiver verbalized understanding.   Discharge medications reviewed with the patient and caregiver and appropriate educational materials and side effects teaching were provided.   ___________________________________________________________________________________________________________________________________

## 2019-04-18 NOTE — ANESTHESIA PREPROCEDURE EVALUATION
Relevant Problems No relevant active problems Anesthetic History PONV Review of Systems / Medical History Patient summary reviewed, nursing notes reviewed and pertinent labs reviewed Pulmonary Within defined limits Neuro/Psych Within defined limits Cardiovascular Within defined limits GI/Hepatic/Renal 
  
GERD Endo/Other Morbid obesity Other Findings Physical Exam 
 
Airway Mallampati: II 
TM Distance: 4 - 6 cm Neck ROM: normal range of motion Mouth opening: Normal 
 
 Cardiovascular Regular rate and rhythm,  S1 and S2 normal,  no murmur, click, rub, or gallop Dental 
No notable dental hx Pulmonary Breath sounds clear to auscultation Abdominal 
GI exam deferred Other Findings Anesthetic Plan ASA: 2 Anesthesia type: general 
 
 
 
 
Induction: Intravenous Anesthetic plan and risks discussed with: Patient

## 2019-04-18 NOTE — INTERVAL H&P NOTE
H&P Update:  Nadege Dow was seen and examined. History and physical has been reviewed. The patient has been examined.  There have been no significant clinical changes since the completion of the originally dated History and Physical.

## 2019-04-18 NOTE — PERIOP NOTES
TRANSFER - OUT REPORT:    Verbal report given to [facility] RN (name) on Sendy Saucedo  being transferred to Phase II (unit) for routine post - op       Report consisted of patients Situation, Background, Assessment and   Recommendations(SBAR). Information from the following report(s) SBAR, Kardex, Procedure Summary and Intake/Output was reviewed with the receiving nurse. Lines:   Peripheral IV 04/18/19 Left Antecubital (Active)   Site Assessment Clean, dry, & intact 4/18/2019 11:07 AM   Phlebitis Assessment 0 4/18/2019 11:07 AM   Infiltration Assessment 0 4/18/2019 11:07 AM   Dressing Status Clean, dry, & intact 4/18/2019 11:07 AM   Dressing Type Transparent;Tape 4/18/2019 11:07 AM   Hub Color/Line Status Infusing 4/18/2019 11:07 AM        Intake/Output Summary (Last 24 hours) at 4/18/2019 1251  Last data filed at 4/18/2019 1251  Gross per 24 hour   Intake 2000 ml   Output 5 ml   Net 1995 ml       Opportunity for questions and clarification was provided.       Patient transported with:   O2 @ 2 liters  Registered Nurse

## 2019-04-18 NOTE — PERIOP NOTES
TRANSFER - IN REPORT:    Verbal report received from [address] [place] [address] Drive, [address] [place] [address] nurse (name) on Nisa Garcia  being received from OR (unit) for routine progression of care      Report consisted of patients Situation, Background, Assessment and   Recommendations(SBAR). Information from the following report(s) OR Summary, Procedure Summary, Intake/Output and MAR was reviewed with the receiving nurse. Opportunity for questions and clarification was provided. Assessment completed upon patients arrival to unit and care assumed.

## 2019-04-18 NOTE — PERIOP NOTES
Upon discharge - pt states missing gold star charm with \"Mom\" on it - pts  states \" I took it off of her\" - looked in  preop room , in gown , linen - no charm - pts  looking in his bag - states \" I will look better at home\"

## 2019-04-18 NOTE — PERIOP NOTES
Reviewed PTA medication list with patient/caregiver and patient/caregiver denies any additional medications. Patient admits to having a responsible adult care for them for at least 24 hours after surgery.     Urine pregnancy results Negative and verified with A Fabian BLANCO.

## 2019-04-19 NOTE — OP NOTES
[facility]  OPERATIVE REPORT    Name:  Laxmi Acevedo  MR#:   [id number]  :  1980  ACCOUNT #:  [de-identified]  DATE OF SERVICE:  2019    PREOPERATIVE DIAGNOSES:  Chronic abdominal pain with history of abnormal CT of the abdomen. POSTOPERATIVE DIAGNOSES:  Chronic abdominal pain with history of abnormal CT of the abdomen with internal hernia containing three-fourths of her small bowel. PROCEDURE PERFORMED:  1. Laparoscopic lysis of adhesions. 2.  Reduction and repair of the internal hernia. 3.  Endoscopy. SURGEON:  Sherif Perez MD    ASSISTANT:  CHIKIS Armas.   assisted with adhesiolysis during the procedure, reduction of internal hernia and repair of same. ANESTHESIA:  General endotracheal.    COMPLICATIONS:  None. SPECIMENS REMOVED:  None. IMPLANTS: none    ESTIMATED BLOOD LOSS:  None. FINDINGS:  1. EGD was performed to rule out a stricture since she had a stricture previously. 2.  The patient did have an internal hernia present encompassing about three-fourths of her small bowel. This was reduced and repaired via running suture technique. STATEMENT OF MEDICAL NECESSITY:  The patient is a 25-year-old female with a complex history. She is here for gastric bypass procedure. In 2018, underwent exploration for abdominal pain. At that exploration, she had a dense band adhesion extending from her transverse colon and deeper into her pelvis to her pelvic organs, and this was causing obstruction. This was released and her abdomen was assessed, and she did not have any evidence of any internal hernia at that juncture. She did fine for several months, then was seen at another hospital where she underwent scan, it was an abnormal CT scan. She underwent exploration by another surgeon. Surgeon felt like the gastric bypass was normal, and she recovered uneventfully. She had been doing fine for a couple months until this past week.   She had intermittent episodes of abdominal pain. She had a CT scan recently which showed swelling of her mesentery, which was unchanged from the CT scan prior to the other surgeon's exploration of her abdomen. I saw her in the office. I examined her. I did feel like she was certainly having pain, and I recommended exploration of this structure due to her abnormal CT findings. Likewise, I did recommend endoscopy during the procedure, as she had a stricture in the past.  We want to rule that out as a etiology of her pain. PROCEDURE:  The patient was brought to the operating room, placed on the table in supine position, at which time general anesthesia was administered without any difficulty. Her abdomen was then prepped and draped in the usual sterile fashion. Using a #15 blade, a 1-cm incision was made just left of the umbilicus. Veress needle approach was used to gain access to the peritoneal cavity, which was then insufflated. A Visiport was then placed to that side, then two right upper quadrant trocars were placed in the usual fashion. I did place one trocar in the left quadrant region to assist with exposure having my PA assist with that incision. Immediately on entering the abdomen, the patient had some adhesions present anteriorly on the abdominal wall, which were taken down using the hook Bovie cautery. I then examined the terminal ileum, it was being pulled medially. This was consistent with an internal hernia. I began to move in a retrograde fashion from the ileum, and as I traced out the bowel to see that the hernia was reducing while I was tracing the bowel in the retrograde fashion. As I was able to reduce everything, it was clear that the patient had a Hyman's defect-type hernia and this was exposed and I began the closure of this using running 2-0 Ethibond suture. The suture was completely intact closing the entire defect against the transverse colon.   After this was achieved, I then ran the bowel in all directions. The Adelfo limb was normal, the afferent limb was normal, and the efferent limb was normal now. I then went towards the head of the table, I performed an endoscopy on the patient. The patient's oropharynx was normal.  Distal esophagus was normal.  Pouch was completely normal, and the anastomosis was about 3 cm in size roughly, and there were no ulcers present. I then terminated the endoscopy and I went back towards the abdominal portion of the operation where I checked all areas for hemostasis. I then removed all trocars, closing the skin incision using 4-0 subcuticular Monocryl. Steri-Strips and sterile dressings were applied. The patient tolerated the procedure well.       Eugene Lamb MD      [id number]  D:  04/18/2019 14:06  T:  04/18/2019 23:41  JOB #:  [id number]

## 2019-04-24 ENCOUNTER — OFFICE VISIT (OUTPATIENT)
Dept: SURGERY | Age: 39
End: 2019-04-24

## 2019-04-24 VITALS
WEIGHT: 130.6 LBS | BODY MASS INDEX: 27.41 KG/M2 | DIASTOLIC BLOOD PRESSURE: 73 MMHG | SYSTOLIC BLOOD PRESSURE: 118 MMHG | OXYGEN SATURATION: 100 % | TEMPERATURE: 98 F | HEART RATE: 67 BPM | RESPIRATION RATE: 16 BRPM | HEIGHT: 58 IN

## 2019-04-24 DIAGNOSIS — Z98.84 S/P GASTRIC BYPASS: ICD-10-CM

## 2019-04-24 DIAGNOSIS — K90.9 INTESTINAL MALABSORPTION, UNSPECIFIED TYPE: Primary | ICD-10-CM

## 2019-04-24 NOTE — PROGRESS NOTES
1 year follow-up post gastric bypass / 6 days post internal hernia repair Subjective:  
 
June Gonzalez  is a 35 y.o. female who presents for follow-up about 13 months following laparoscopic gastric bypass surgery. She has lost a total of 100 pounds since surgery. Body mass index is 27.3 kg/m². . EBWL is (adequate). The patient presents today to assess their progress toward their goal of weight loss and to address any issues that may be present. Today the patient and I have reviewed their diet and how appropriate their food choices are. The following issues have been identified  - pt doing well 6 days post internal hernia repair. Pain assessment - 3/10 [facility] Surgery related complication: NA - pt has had complex hx over the past year to include the following;  
 
March 6508 - uncomplicated gastric bypass Oct 2018 - dx lap'scope for abd pain / lysis of adhesions (benign findings) Jan 2019 - dx lap'scope for abd pain / lysis of adhesions (benign findings) by Dr. Duke Montana at [facility] April 2019 - repair of internal hernia She reports expected 6 day post-op soreness but states she is having no issues with PO intake and denies vomiting, diarrhea and difficulty breathing. The patient's exercise level: moderately active. Changes in her medical history and medications have been reviewed. Patient Active Problem List  
Diagnosis Code  Morbid obesity (HCC) E66.01  
 Constipation K59.00  
 History of colon polyps Z86.010  
 Intestinal malabsorption K90.9  S/P gastric bypass Z98.84  
 Hypokalemia E87.6  Hypomagnesemia E83.42  
 Iron deficiency E61.1  Pica F50.89  Intertriginous candidiasis B37.2  Internal hernia K45.8 Past Medical History:  
Diagnosis Date  Constipation  Functional dyspepsia   
 uses OTC meds  GERD (gastroesophageal reflux disease)  History of colon polyps  Morbid obesity ([address]  Morbid obesity with body mass index (BMI) of 40.0 to 49.9 ([facility]  Nausea & vomiting 2018  
 SBO (small bowel obstruction) (HCC) Past Surgical History:  
Procedure Laterality Date  ABDOMEN SURGERY PROC UNLISTED    
 adhensions on liver  COLONOSCOPY X 3  
 DELIVERY     
 X 3  
 HX  SECTION    
 HX COLECTOMY  HX DILATION AND CURETTAGE  2005  HX GASTRIC BYPASS  HX GI    
 gastric bypass  HX OTHER SURGICAL    
 scar resection back of head  HX TUBAL LIGATION    
 HX UROLOGICAL    
 stent [address] Current Outpatient Medications Medication Sig Dispense Refill  cyanocobalamin (VITAMIN B-12) 1,000 mcg sublingual tablet Take 1,000 mcg by mouth daily.  Biotin 2,500 mcg cap Take  by mouth.  multivit-minerals/folic acid (ADULT MULTIVITAMIN GUMMIES PO) Take  by mouth.  calcium citrate 200 mg (950 mg) tablet Take 200 mg by mouth two (2) times a day. Review of Symptoms:  
 
General - No history or complaints of unexpected fever or chills Head/Neck - No history or complaints of headache or dizziness Cardiac - No history or complaints of chest pain, palpitations, or shortness of breath Pulmonary - No history or complaints of shortness of breath or productive cough Gastrointestinal - as noted above Genitourinary - No history or complaints of hematuria/dysuria or renal lithiasis Musculoskeletal - No history or complaints of joint  muscular weakness Hematologic - No history of any bleeding episodes Neurologic - No history or complaints of  migraine headaches or neurologic symptoms Objective:  
 
Visit Vitals /73 (BP 1 Location: Right arm, BP Patient Position: Sitting) Pulse 67 Temp 98 °F (36.7 °C) Resp 16 Ht 4' 10\" (1.473 m) Wt 59.2 kg (130 lb 9.6 oz) LMP 2019 (Approximate) SpO2 100% BMI 27.30 kg/m² Physical Exam: General:  alert, cooperative, no distress, appears stated age Lungs:   clear to auscultation bilaterally Heart:  Regular rate and rhythm Abdomen:   abdomen is soft without significant tenderness, masses, organomegaly or guarding; Incisions: healing well, no significant drainage Labs:  
 
Recent Results (from the past 2016 hour(s)) URINALYSIS W/ RFLX MICROSCOPIC Collection Time: 04/15/19  5:52 PM  
Result Value Ref Range Color DARK YELLOW Appearance CLEAR Specific gravity >1.030 (H) 1.005 - 1.030  
 pH (UA) 5.0 5.0 - 8.0 Protein TRACE (A) NEG mg/dL Glucose NEGATIVE  NEG mg/dL Ketone NEGATIVE  NEG mg/dL Bilirubin NEGATIVE  NEG Blood NEGATIVE  NEG Urobilinogen 1.0 0.2 - 1.0 EU/dL Nitrites NEGATIVE  NEG Leukocyte Esterase NEGATIVE  NEG    
URINE MICROSCOPIC ONLY Collection Time: 04/15/19  5:52 PM  
Result Value Ref Range WBC 0 to 2 0 - 5 /hpf  
 RBC NEGATIVE  0 - 5 /hpf Epithelial cells FEW 0 - 5 /lpf Bacteria 1+ (A) NEG /hpf Mucus 1+ (A) NEG /lpf OCCULT BLOOD, STOOL Collection Time: 04/15/19  6:15 PM  
Result Value Ref Range Occult blood, stool NEGATIVE  NEG    
CBC WITH AUTOMATED DIFF Collection Time: 04/15/19  6:20 PM  
Result Value Ref Range WBC 5.7 4.6 - 13.2 K/uL  
 RBC 4.05 (L) 4.20 - 5.30 M/uL  
 HGB 11.7 (L) 12.0 - 16.0 g/dL HCT 36.4 35.0 - 45.0 % MCV 89.9 74.0 - 97.0 FL  
 MCH 28.9 24.0 - 34.0 PG  
 MCHC 32.1 31.0 - 37.0 g/dL  
 RDW 13.1 11.6 - 14.5 % PLATELET 670 576 - 792 K/uL MPV 9.6 9.2 - 11.8 FL  
 NEUTROPHILS 63 40 - 73 % LYMPHOCYTES 27 21 - 52 % MONOCYTES 8 3 - 10 % EOSINOPHILS 2 0 - 5 % BASOPHILS 0 0 - 2 %  
 ABS. NEUTROPHILS 3.5 1.8 - 8.0 K/UL  
 ABS. LYMPHOCYTES 1.6 0.9 - 3.6 K/UL  
 ABS. MONOCYTES 0.4 0.05 - 1.2 K/UL  
 ABS. EOSINOPHILS 0.1 0.0 - 0.4 K/UL  
 ABS. BASOPHILS 0.0 0.0 - 0.1 K/UL  
 DF AUTOMATED METABOLIC PANEL, COMPREHENSIVE  Collection Time: 04/15/19  6:20 PM  
 Result Value Ref Range Sodium 138 136 - 145 mmol/L Potassium 4.1 3.5 - 5.5 mmol/L Chloride 107 100 - 108 mmol/L  
 CO2 24 21 - 32 mmol/L Anion gap 7 3.0 - 18 mmol/L Glucose 94 74 - 99 mg/dL BUN 16 7.0 - 18 MG/DL Creatinine 0.68 0.6 - 1.3 MG/DL  
 BUN/Creatinine ratio 24 (H) 12 - 20 GFR est AA >60 >60 ml/min/1.73m2 GFR est non-AA >60 >60 ml/min/1.73m2 Calcium 8.8 8.5 - 10.1 MG/DL Bilirubin, total 0.3 0.2 - 1.0 MG/DL  
 ALT (SGPT) 32 13 - 56 U/L  
 AST (SGOT) 33 15 - 37 U/L Alk. phosphatase 83 45 - 117 U/L Protein, total 7.9 6.4 - 8.2 g/dL Albumin 4.0 3.4 - 5.0 g/dL Globulin 3.9 2.0 - 4.0 g/dL A-G Ratio 1.0 0.8 - 1.7 LIPASE Collection Time: 04/15/19  6:20 PM  
Result Value Ref Range Lipase 187 73 - 393 U/L  
HCG URINE, QL. - POC Collection Time: 04/15/19  7:09 PM  
Result Value Ref Range Pregnancy test,urine (POC) NEGATIVE  NEG    
HCG URINE, QL. - POC Collection Time: 04/18/19  8:40 AM  
Result Value Ref Range Pregnancy test,urine (POC) NEGATIVE  NEG Assessment:  
 
1. History of Morbid obesity, status post  laparoscopic gastric bypass surgery. The patient is happy with her progress over the past 6 days. We have had a long discussion regarding the nature of her internal hernia the concept of an intermittent hernia that she was probably suffering from in the past but reduced itself prior to her two previous laparoscopies prior to her April 18th repair of her witnessed internal hernia. She is happy to learn that she had a definitive repair of her hernia and the chances of a recurrence are rare. She is happy with her weight loss and desires no further weight loss. Plan: 1. Remember to measure portions, continue low carbohydrate diet 2. Reviewed labs and appropriate changes made to vitamin regimen 3. Remember vitamin supplements. 4. Exercise regimen appears adequate. 5. Attend support group 6. Follow-up in 6 month(s). 7. The patient understands the plan of action 8. Total time spent with the patient 30 minutes.

## 2019-04-30 ENCOUNTER — TELEPHONE (OUTPATIENT)
Dept: SURGERY | Age: 39
End: 2019-04-30

## 2019-06-17 ENCOUNTER — TELEPHONE (OUTPATIENT)
Dept: OTHER | Age: 39
End: 2019-06-17

## 2019-06-17 NOTE — TELEPHONE ENCOUNTER
Per MBSAQIP 30 day post surgical requirements:  Spoke with patient. Denies reoperations, readmissions, IVF or ED visits. States that she is struggling with solid foods. Encourage her to make an appt with the dietician. Also stated that her abdomen is firm. Patient passing gas and having regular BM's. Encouraged patient to schedule appt with one of the providers as well. Patient verbalized understanding.

## 2019-08-09 ENCOUNTER — TELEPHONE (OUTPATIENT)
Dept: SURGERY | Age: 39
End: 2019-08-09

## 2019-08-09 NOTE — TELEPHONE ENCOUNTER
RN attempted to contacted patient re: stomach pain. Unable to leave a VM message, as mailbox is full. RN was able to leave a message on home number. RN recommended patient go to ED for further evaluation due to her history. RN will attempt to contact patient on her cell phone throughout the day.

## 2019-08-09 NOTE — TELEPHONE ENCOUNTER
Patient described her pain to her abdomen as \"tightening\" and intermittent. Shannan Amos, reviewed patient's chart and recommended patient go to ED for further evaluation, in which she verbalized understanding. Dr. Nash Beasley and ED informed.

## 2019-08-12 ENCOUNTER — TELEPHONE (OUTPATIENT)
Dept: SURGERY | Age: 39
End: 2019-08-12

## 2019-08-12 NOTE — TELEPHONE ENCOUNTER
RN contacted patient in f/u to abdominal pain. Patient stated she never went to the ED, because \"They would just send her home if her pain wasn't 100 out of 100,\" so she was going to wait until it was severe enough to warrant going. Patient stated she was going to go to [facility] as it was closer to her home. RN explained the severity of a potential bowel obstruction and informed her that [facility] ED had received report from [place], [place], and were prepared for her visit, as well as the potential for a delay in transferring patient to [facility] from [facility] should she require surgery. She verbalized understanding and stated she \"was better. \"  Jonnie Williamson and Dr. Beulah Martinez informed.

## 2019-10-30 ENCOUNTER — OFFICE VISIT (OUTPATIENT)
Dept: SURGERY | Age: 39
End: 2019-10-30

## 2019-10-30 VITALS
RESPIRATION RATE: 16 BRPM | SYSTOLIC BLOOD PRESSURE: 105 MMHG | WEIGHT: 132 LBS | HEIGHT: 58 IN | HEART RATE: 60 BPM | BODY MASS INDEX: 27.71 KG/M2 | OXYGEN SATURATION: 100 % | DIASTOLIC BLOOD PRESSURE: 66 MMHG | TEMPERATURE: 98.6 F

## 2019-10-30 DIAGNOSIS — Z98.84 S/P GASTRIC BYPASS: ICD-10-CM

## 2019-10-30 DIAGNOSIS — K90.9 INTESTINAL MALABSORPTION, UNSPECIFIED TYPE: Primary | ICD-10-CM

## 2019-10-30 NOTE — PROGRESS NOTES
1.5 years follow-up bypass / 6 months post repair internal hernia    Subjective:     Tye Garcia  is a 35 y.o. female who presents for follow-up about 1.5 years following laparoscopic gastric bypass surgery. She has lost a total of 98 pounds since surgery. Body mass index is 27.59 kg/m². . EBWL is (adequate). The patient presents today to assess their progress toward their goal of weight loss and to address any issues that may be present. Today the patient and I have reviewed their diet and how appropriate their food choices are. The following issues have been identified - her weight has been stable over the past 6 months. Pain assessment - 0/10  . Surgery related complication: NA - complex surgical history to include the following:    March 5147 - uncomplicated gastric bypass  Oct 2018 - dx lap'scope for abd pain / lysis of adhesions (benign findings)  Jan 2019 - dx lap'scope for abd pain / lysis of adhesions (benign findings) by Dr. Agapito Parker at [facility]  April 2019 - repair of internal hernia       She reports no issues since her internal hernia repair and denies vomiting, abdominal pain, diarrhea and difficulty breathing. The patient's exercise level: moderately active. Changes in her medical history and medications have been reviewed.     Patient Active Problem List   Diagnosis Code    Constipation K59.00    History of colon polyps Z86.010    Intestinal malabsorption K90.9    S/P gastric bypass Z98.84    Hypokalemia E87.6    Hypomagnesemia E83.42    Iron deficiency E61.1    Pica F50.89    Intertriginous candidiasis B37.2    Internal hernia K45.8     Past Medical History:   Diagnosis Date    Constipation     Functional dyspepsia     uses OTC meds    GERD (gastroesophageal reflux disease)     History of colon polyps     Morbid obesity ([facility]     Morbid obesity with body mass index (BMI) of 40.0 to 49.9 ([facility])     Nausea & vomiting 6/6/2018    SBO (small bowel obstruction) [facility])      Past Surgical History:   Procedure Laterality Date    ABDOMEN SURGERY PROC UNLISTED      adhensions on liver    COLONOSCOPY      X 3    DELIVERY       X 3    HX  SECTION      HX COLECTOMY      HX DILATION AND CURETTAGE      HX GASTRIC BYPASS      HX GI      gastric bypass    HX OTHER SURGICAL      scar resection back of head    HX TUBAL LIGATION      HX UROLOGICAL  2001    stent    IR URETERAL STENT PLACEMENT       Current Outpatient Medications   Medication Sig Dispense Refill    multivit-minerals/folic acid (ADULT MULTIVITAMIN GUMMIES PO) Take  by mouth.  calcium citrate 200 mg (950 mg) tablet Take 200 mg by mouth two (2) times a day.  cyanocobalamin (VITAMIN B-12) 1,000 mcg sublingual tablet Take 1,000 mcg by mouth daily.  Biotin 2,500 mcg cap Take  by mouth.          Review of Symptoms:     General - No history or complaints of unexpected fever or chills  Head/Neck - No history or complaints of headache or dizziness  Cardiac - No history or complaints of chest pain, palpitations, or shortness of breath  Pulmonary - No history or complaints of shortness of breath or productive cough  Gastrointestinal - as noted above  Genitourinary - No history or complaints of hematuria/dysuria or renal lithiasis  Musculoskeletal - No history or complaints of joint  muscular weakness  Hematologic - No history of any bleeding episodes  Neurologic - No history or complaints of  migraine headaches or neurologic symptoms    Objective:     Visit Vitals  /66 (BP 1 Location: Left arm, BP Patient Position: Sitting)   Pulse 60   Temp 98.6 °F (37 °C)   Resp 16   Ht 4' 10\" (1.473 m)   Wt 59.9 kg (132 lb)   SpO2 100%   BMI 27.59 kg/m²        Physical Exam:    General:  alert, cooperative, no distress, appears stated age   Lungs:   clear to auscultation bilaterally   Heart:  Regular rate and rhythm   Abdomen:   abdomen is soft without significant tenderness, masses, organomegaly or guarding; Incisions: healing well, no significant drainage         Labs:     No results found for this or any previous visit (from the past 2016 hour(s)). Assessment:     1. History of Morbid obesity, status post  laparoscopic gastric bypass surgery. The patient's biggest concern today is with her loose skin in the abdominal region. She has no co-morbid issues to address today as she takes no medications for HTN, DM, or cholesterol. She is happy with her weight loss. Plan:     1. Remember to measure portions, continue low carbohydrate diet  2. Reviewed labs and appropriate changes made to vitamin regimen  3. Remember vitamin supplements. 4. Exercise regimen appears adequate. 5. Attend support group  6. Follow-up in 6 month(s). 7. The patient understands the plan of action  8. Total time spent with the patient 30 minutes.

## 2019-10-30 NOTE — PROGRESS NOTES
Tye Garcia presents today for No chief complaint on file. Is someone accompanying this pt? no    Is the patient using any DME equipment during [address]? no    Depression Screening:  3 most recent PHQ Screens 10/30/2019   Little interest or pleasure in doing things Not at all   Feeling down, depressed, irritable, or hopeless Not at all   Total Score PHQ 2 0       Learning Assessment:  Learning Assessment 4/24/2019   PRIMARY LEARNER Patient   HIGHEST LEVEL OF EDUCATION - PRIMARY LEARNER  -   BARRIERS PRIMARY LEARNER -   [address]    NEED -   LEARNER PREFERENCE PRIMARY DEMONSTRATION   LEARNING SPECIAL TOPICS -   ANSWERED BY patient   RELATIONSHIP SELF       Abuse Screening:  Abuse Screening Questionnaire 10/30/2019   Do you ever feel afraid of your partner? N   Are you in a relationship with someone who physically or mentally threatens you? N   Is it safe for you to go home? Y       Fall Risk  No flowsheet data found. Coordination of Care:  1. Have you been to the ER, urgent care clinic since your last visit? Hospitalized since your last visit? no    2. Have you seen or consulted any other health care providers outside of the [address] [place] [address] since your last visit? Include any pap smears or colon screening.  no

## 2019-10-30 NOTE — PATIENT INSTRUCTIONS
Body Mass Index: Care Instructions  Your Care Instructions    Body mass index (BMI) can help you see if your weight is raising your risk for health problems. It uses a formula to compare how much you weigh with how tall you are. · A BMI lower than 18.5 is considered underweight. · A BMI between 18.5 and 24.9 is considered healthy. · A BMI between 25 and 29.9 is considered overweight. A BMI of 30 or higher is considered obese. If your BMI is in the normal range, it means that you have a lower risk for weight-related health problems. If your BMI is in the overweight or obese range, you may be at increased risk for weight-related health problems, such as high blood pressure, heart disease, stroke, arthritis or joint pain, and diabetes. If your BMI is in the underweight range, you may be at increased risk for health problems such as fatigue, lower protection (immunity) against illness, muscle loss, bone loss, hair loss, and hormone problems. BMI is just one measure of your risk for weight-related health problems. You may be at higher risk for health problems if you are not active, you eat an unhealthy diet, or you drink too much alcohol or use tobacco products. Follow-up care is a key part of your treatment and safety. Be sure to make and go to all appointments, and call your doctor if you are having problems. It's also a good idea to know your test results and keep a list of the medicines you take. How can you care for yourself at home? · Practice healthy eating habits. This includes eating plenty of fruits, vegetables, whole grains, lean protein, and low-fat dairy. · If your doctor recommends it, get more exercise. Walking is a good choice. Bit by bit, increase the amount you walk every day. Try for at least 30 minutes on most days of the week. · Do not smoke. Smoking can increase your risk for health problems. If you need help quitting, talk to your doctor about stop-smoking programs and medicines. These can increase your chances of quitting for good. · Limit alcohol to 2 drinks a day for men and 1 drink a day for women. Too much alcohol can cause health problems. If you have a BMI higher than 25  · Your doctor may do other tests to check your risk for weight-related health problems. This may include measuring the distance around your waist. A waist measurement of more than 40 inches in men or 35 inches in women can increase the risk of weight-related health problems. · Talk with your doctor about steps you can take to stay healthy or improve your health. You may need to make lifestyle changes to lose weight and stay healthy, such as changing your diet and getting regular exercise. If you have a BMI lower than 18.5  · Your doctor may do other tests to check your risk for health problems. · Talk with your doctor about steps you can take to stay healthy or improve your health. You may need to make lifestyle changes to gain or maintain weight and stay healthy, such as getting more healthy foods in your diet and doing exercises to build muscle. Where can you learn more? Go to http://jorge-prisca.info/. Enter S176 in the search box to learn more about \"Body Mass Index: Care Instructions. \"  Current as of: March 28, 2019  Content Version: 12.2  © 4130-3498 [employer]. Care instructions adapted under license by [employer] (which disclaims liability or warranty for this information). If you have questions about a medical condition or this instruction, always ask your healthcare professional. [address] any warranty or liability for your use of this information.

## 2020-01-14 ENCOUNTER — ANESTHESIA (OUTPATIENT)
Dept: SURGERY | Age: 40
DRG: 331 | End: 2020-01-14
Payer: OTHER GOVERNMENT

## 2020-01-14 ENCOUNTER — APPOINTMENT (OUTPATIENT)
Dept: GENERAL RADIOLOGY | Age: 40
DRG: 331 | End: 2020-01-14
Attending: SPECIALIST
Payer: OTHER GOVERNMENT

## 2020-01-14 ENCOUNTER — ANESTHESIA EVENT (OUTPATIENT)
Dept: SURGERY | Age: 40
DRG: 331 | End: 2020-01-14
Payer: OTHER GOVERNMENT

## 2020-01-14 ENCOUNTER — HOSPITAL ENCOUNTER (INPATIENT)
Age: 40
LOS: 3 days | Discharge: HOME | DRG: 331 | End: 2020-01-17
Attending: SPECIALIST | Admitting: SPECIALIST
Payer: OTHER GOVERNMENT

## 2020-01-14 DIAGNOSIS — G89.18 POST-OP PAIN: Primary | ICD-10-CM

## 2020-01-14 PROBLEM — K56.609 SMALL BOWEL OBSTRUCTION (HCC): Status: ACTIVE | Noted: 2020-01-14

## 2020-01-14 LAB
ABO + RH BLD: NORMAL
ANION GAP SERPL CALC-SCNC: 8 MMOL/L (ref 3–18)
BASOPHILS # BLD: 0 K/UL (ref 0–0.1)
BASOPHILS NFR BLD: 0 % (ref 0–2)
BLOOD GROUP ANTIBODIES SERPL: NORMAL
BUN SERPL-MCNC: 9 MG/DL (ref 7–18)
BUN/CREAT SERPL: 16 (ref 12–20)
CALCIUM SERPL-MCNC: 8.4 MG/DL (ref 8.5–10.1)
CHLORIDE SERPL-SCNC: 108 MMOL/L (ref 100–111)
CO2 SERPL-SCNC: 24 MMOL/L (ref 21–32)
CREAT SERPL-MCNC: 0.56 MG/DL (ref 0.6–1.3)
DIFFERENTIAL METHOD BLD: ABNORMAL
EOSINOPHIL # BLD: 0 K/UL (ref 0–0.4)
EOSINOPHIL NFR BLD: 0 % (ref 0–5)
ERYTHROCYTE [DISTWIDTH] IN BLOOD BY AUTOMATED COUNT: 12.3 % (ref 11.6–14.5)
GLUCOSE SERPL-MCNC: 93 MG/DL (ref 74–99)
HCG SERPL QL: NEGATIVE
HCT VFR BLD AUTO: 33.2 % (ref 35–45)
HGB BLD-MCNC: 10.7 G/DL (ref 12–16)
LYMPHOCYTES # BLD: 1.1 K/UL (ref 0.9–3.6)
LYMPHOCYTES NFR BLD: 21 % (ref 21–52)
MAGNESIUM SERPL-MCNC: 1.8 MG/DL (ref 1.6–2.6)
MCH RBC QN AUTO: 29.7 PG (ref 24–34)
MCHC RBC AUTO-ENTMCNC: 32.2 G/DL (ref 31–37)
MCV RBC AUTO: 92.2 FL (ref 74–97)
MONOCYTES # BLD: 0.4 K/UL (ref 0.05–1.2)
MONOCYTES NFR BLD: 8 % (ref 3–10)
NEUTS SEG # BLD: 3.7 K/UL (ref 1.8–8)
NEUTS SEG NFR BLD: 71 % (ref 40–73)
PLATELET # BLD AUTO: 142 K/UL (ref 135–420)
PMV BLD AUTO: 10 FL (ref 9.2–11.8)
POTASSIUM SERPL-SCNC: 3.6 MMOL/L (ref 3.5–5.5)
RBC # BLD AUTO: 3.6 M/UL (ref 4.2–5.3)
SODIUM SERPL-SCNC: 140 MMOL/L (ref 136–145)
SPECIMEN EXP DATE BLD: NORMAL
WBC # BLD AUTO: 5.2 K/UL (ref 4.6–13.2)

## 2020-01-14 PROCEDURE — 86900 BLOOD TYPING SEROLOGIC ABO: CPT

## 2020-01-14 PROCEDURE — 77030040361 HC SLV COMPR DVT MDII -B: Performed by: SPECIALIST

## 2020-01-14 PROCEDURE — 77030036731 HC STPLR ENDOSC J&J -F: Performed by: SPECIALIST

## 2020-01-14 PROCEDURE — 77030008462 HC STPLR SKN PROX J&J -A: Performed by: SPECIALIST

## 2020-01-14 PROCEDURE — 77030014008 HC SPNG HEMSTAT J&J -C: Performed by: SPECIALIST

## 2020-01-14 PROCEDURE — 85025 COMPLETE CBC W/AUTO DIFF WBC: CPT

## 2020-01-14 PROCEDURE — 77030027138 HC INCENT SPIROMETER -A

## 2020-01-14 PROCEDURE — 74011250636 HC RX REV CODE- 250/636: Performed by: ANESTHESIOLOGY

## 2020-01-14 PROCEDURE — 80048 BASIC METABOLIC PNL TOTAL CA: CPT

## 2020-01-14 PROCEDURE — 77030012770 HC TRCR OPT FX AMR -B: Performed by: SPECIALIST

## 2020-01-14 PROCEDURE — 77030008518 HC TBNG INSUF ENDO STRY -B: Performed by: SPECIALIST

## 2020-01-14 PROCEDURE — 77030012890

## 2020-01-14 PROCEDURE — 77030002933 HC SUT MCRYL J&J -A: Performed by: SPECIALIST

## 2020-01-14 PROCEDURE — 77030002966 HC SUT PDS J&J -A: Performed by: SPECIALIST

## 2020-01-14 PROCEDURE — 88307 TISSUE EXAM BY PATHOLOGIST: CPT

## 2020-01-14 PROCEDURE — 77030010512 HC APPL CLP LIG J&J -C: Performed by: SPECIALIST

## 2020-01-14 PROCEDURE — 74011250636 HC RX REV CODE- 250/636: Performed by: SPECIALIST

## 2020-01-14 PROCEDURE — 0DJD4ZZ INSPECTION OF LOWER INTESTINAL TRACT, PERCUTANEOUS ENDOSCOPIC APPROACH: ICD-10-PCS | Performed by: SPECIALIST

## 2020-01-14 PROCEDURE — 84703 CHORIONIC GONADOTROPIN ASSAY: CPT

## 2020-01-14 PROCEDURE — 74011000250 HC RX REV CODE- 250: Performed by: ANESTHESIOLOGY

## 2020-01-14 PROCEDURE — 76060000035 HC ANESTHESIA 2 TO 2.5 HR: Performed by: SPECIALIST

## 2020-01-14 PROCEDURE — 77030008477 HC STYL SATN SLP COVD -A: Performed by: ANESTHESIOLOGY

## 2020-01-14 PROCEDURE — 77010033678 HC OXYGEN DAILY

## 2020-01-14 PROCEDURE — 65270000029 HC RM PRIVATE

## 2020-01-14 PROCEDURE — 77030018836 HC SOL IRR NACL ICUM -A: Performed by: SPECIALIST

## 2020-01-14 PROCEDURE — 76210000016 HC OR PH I REC 1 TO 1.5 HR: Performed by: SPECIALIST

## 2020-01-14 PROCEDURE — 36415 COLL VENOUS BLD VENIPUNCTURE: CPT

## 2020-01-14 PROCEDURE — 77030011808 HC STPLR ENDOSCOPIC J&J -D: Performed by: SPECIALIST

## 2020-01-14 PROCEDURE — 74011000258 HC RX REV CODE- 258: Performed by: SPECIALIST

## 2020-01-14 PROCEDURE — 77030002912 HC SUT ETHBND J&J -A: Performed by: SPECIALIST

## 2020-01-14 PROCEDURE — 83735 ASSAY OF MAGNESIUM: CPT

## 2020-01-14 PROCEDURE — 77030003578 HC NDL INSUF VERES AMR -B: Performed by: SPECIALIST

## 2020-01-14 PROCEDURE — 77030013079 HC BLNKT BAIR HGGR 3M -A: Performed by: ANESTHESIOLOGY

## 2020-01-14 PROCEDURE — 77030002996 HC SUT SLK J&J -A: Performed by: SPECIALIST

## 2020-01-14 PROCEDURE — 77030009979 HC RELD STPLR TCR J&J -C: Performed by: SPECIALIST

## 2020-01-14 PROCEDURE — 77030020829: Performed by: SPECIALIST

## 2020-01-14 PROCEDURE — 77030006643: Performed by: ANESTHESIOLOGY

## 2020-01-14 PROCEDURE — 77030016151 HC PROTCTR LNS DFOG COVD -B: Performed by: SPECIALIST

## 2020-01-14 PROCEDURE — 76010000131 HC OR TIME 2 TO 2.5 HR: Performed by: SPECIALIST

## 2020-01-14 PROCEDURE — 74018 RADEX ABDOMEN 1 VIEW: CPT

## 2020-01-14 PROCEDURE — 77030040361 HC SLV COMPR DVT MDII -B

## 2020-01-14 PROCEDURE — 77030011264 HC ELECTRD BLD EXT COVD -A: Performed by: SPECIALIST

## 2020-01-14 PROCEDURE — 77030008683 HC TU ET CUF COVD -A: Performed by: ANESTHESIOLOGY

## 2020-01-14 PROCEDURE — 74011000250 HC RX REV CODE- 250: Performed by: SPECIALIST

## 2020-01-14 PROCEDURE — 0DTH0ZZ RESECTION OF CECUM, OPEN APPROACH: ICD-10-PCS | Performed by: SPECIALIST

## 2020-01-14 RX ORDER — SODIUM CHLORIDE 0.9 % (FLUSH) 0.9 %
5-40 SYRINGE (ML) INJECTION AS NEEDED
Status: DISCONTINUED | OUTPATIENT
Start: 2020-01-14 | End: 2020-01-14 | Stop reason: HOSPADM

## 2020-01-14 RX ORDER — ONDANSETRON 2 MG/ML
4 INJECTION INTRAMUSCULAR; INTRAVENOUS
Status: DISCONTINUED | OUTPATIENT
Start: 2020-01-14 | End: 2020-01-17 | Stop reason: HOSPADM

## 2020-01-14 RX ORDER — DIPHENHYDRAMINE HYDROCHLORIDE 50 MG/ML
12.5 INJECTION, SOLUTION INTRAMUSCULAR; INTRAVENOUS
Status: DISCONTINUED | OUTPATIENT
Start: 2020-01-14 | End: 2020-01-14 | Stop reason: HOSPADM

## 2020-01-14 RX ORDER — SODIUM CHLORIDE, SODIUM LACTATE, POTASSIUM CHLORIDE, CALCIUM CHLORIDE 600; 310; 30; 20 MG/100ML; MG/100ML; MG/100ML; MG/100ML
150 INJECTION, SOLUTION INTRAVENOUS CONTINUOUS
Status: DISCONTINUED | OUTPATIENT
Start: 2020-01-14 | End: 2020-01-17 | Stop reason: HOSPADM

## 2020-01-14 RX ORDER — FENTANYL CITRATE 50 UG/ML
25 INJECTION, SOLUTION INTRAMUSCULAR; INTRAVENOUS
Status: DISCONTINUED | OUTPATIENT
Start: 2020-01-14 | End: 2020-01-14 | Stop reason: HOSPADM

## 2020-01-14 RX ORDER — CEFAZOLIN SODIUM 2 G/50ML
2 SOLUTION INTRAVENOUS ONCE
Status: COMPLETED | OUTPATIENT
Start: 2020-01-14 | End: 2020-01-14

## 2020-01-14 RX ORDER — SODIUM CHLORIDE 0.9 % (FLUSH) 0.9 %
5-40 SYRINGE (ML) INJECTION EVERY 8 HOURS
Status: DISCONTINUED | OUTPATIENT
Start: 2020-01-14 | End: 2020-01-14

## 2020-01-14 RX ORDER — INSULIN LISPRO 100 [IU]/ML
INJECTION, SOLUTION INTRAVENOUS; SUBCUTANEOUS ONCE
Status: DISCONTINUED | OUTPATIENT
Start: 2020-01-14 | End: 2020-01-14 | Stop reason: HOSPADM

## 2020-01-14 RX ORDER — KETAMINE HYDROCHLORIDE 50 MG/ML
INJECTION, SOLUTION INTRAMUSCULAR; INTRAVENOUS AS NEEDED
Status: DISCONTINUED | OUTPATIENT
Start: 2020-01-14 | End: 2020-01-14 | Stop reason: HOSPADM

## 2020-01-14 RX ORDER — HYDROMORPHONE HYDROCHLORIDE 1 MG/ML
0.5 INJECTION, SOLUTION INTRAMUSCULAR; INTRAVENOUS; SUBCUTANEOUS
Status: DISCONTINUED | OUTPATIENT
Start: 2020-01-14 | End: 2020-01-14

## 2020-01-14 RX ORDER — MAGNESIUM SULFATE 100 %
4 CRYSTALS MISCELLANEOUS AS NEEDED
Status: DISCONTINUED | OUTPATIENT
Start: 2020-01-14 | End: 2020-01-14 | Stop reason: HOSPADM

## 2020-01-14 RX ORDER — ALBUTEROL SULFATE 0.83 MG/ML
2.5 SOLUTION RESPIRATORY (INHALATION) AS NEEDED
Status: DISCONTINUED | OUTPATIENT
Start: 2020-01-14 | End: 2020-01-14 | Stop reason: HOSPADM

## 2020-01-14 RX ORDER — HYDROMORPHONE HYDROCHLORIDE 1 MG/ML
1 INJECTION, SOLUTION INTRAMUSCULAR; INTRAVENOUS; SUBCUTANEOUS ONCE
Status: COMPLETED | OUTPATIENT
Start: 2020-01-14 | End: 2020-01-14

## 2020-01-14 RX ORDER — MIDAZOLAM HYDROCHLORIDE 1 MG/ML
INJECTION, SOLUTION INTRAMUSCULAR; INTRAVENOUS AS NEEDED
Status: DISCONTINUED | OUTPATIENT
Start: 2020-01-14 | End: 2020-01-14 | Stop reason: HOSPADM

## 2020-01-14 RX ORDER — KETOROLAC TROMETHAMINE 15 MG/ML
15 INJECTION, SOLUTION INTRAMUSCULAR; INTRAVENOUS EVERY 6 HOURS
Status: COMPLETED | OUTPATIENT
Start: 2020-01-14 | End: 2020-01-15

## 2020-01-14 RX ORDER — FENTANYL CITRATE 50 UG/ML
INJECTION, SOLUTION INTRAMUSCULAR; INTRAVENOUS AS NEEDED
Status: DISCONTINUED | OUTPATIENT
Start: 2020-01-14 | End: 2020-01-14 | Stop reason: HOSPADM

## 2020-01-14 RX ORDER — NALOXONE HYDROCHLORIDE 0.4 MG/ML
0.4 INJECTION, SOLUTION INTRAMUSCULAR; INTRAVENOUS; SUBCUTANEOUS AS NEEDED
Status: DISCONTINUED | OUTPATIENT
Start: 2020-01-14 | End: 2020-01-17 | Stop reason: HOSPADM

## 2020-01-14 RX ORDER — ACETAMINOPHEN 10 MG/ML
1000 INJECTION, SOLUTION INTRAVENOUS ONCE
Status: COMPLETED | OUTPATIENT
Start: 2020-01-14 | End: 2020-01-14

## 2020-01-14 RX ORDER — ROCURONIUM BROMIDE 10 MG/ML
INJECTION, SOLUTION INTRAVENOUS AS NEEDED
Status: DISCONTINUED | OUTPATIENT
Start: 2020-01-14 | End: 2020-01-14 | Stop reason: HOSPADM

## 2020-01-14 RX ORDER — HYDROMORPHONE HYDROCHLORIDE 1 MG/ML
0.2 INJECTION, SOLUTION INTRAMUSCULAR; INTRAVENOUS; SUBCUTANEOUS AS NEEDED
Status: DISCONTINUED | OUTPATIENT
Start: 2020-01-14 | End: 2020-01-14 | Stop reason: HOSPADM

## 2020-01-14 RX ORDER — KETOROLAC TROMETHAMINE 15 MG/ML
INJECTION, SOLUTION INTRAMUSCULAR; INTRAVENOUS AS NEEDED
Status: DISCONTINUED | OUTPATIENT
Start: 2020-01-14 | End: 2020-01-14 | Stop reason: HOSPADM

## 2020-01-14 RX ORDER — CEFAZOLIN SODIUM 2 G/50ML
2 SOLUTION INTRAVENOUS EVERY 8 HOURS
Status: COMPLETED | OUTPATIENT
Start: 2020-01-14 | End: 2020-01-14

## 2020-01-14 RX ORDER — SODIUM CHLORIDE 0.9 % (FLUSH) 0.9 %
5-40 SYRINGE (ML) INJECTION EVERY 8 HOURS
Status: DISCONTINUED | OUTPATIENT
Start: 2020-01-14 | End: 2020-01-14 | Stop reason: HOSPADM

## 2020-01-14 RX ORDER — SODIUM CHLORIDE, SODIUM LACTATE, POTASSIUM CHLORIDE, CALCIUM CHLORIDE 600; 310; 30; 20 MG/100ML; MG/100ML; MG/100ML; MG/100ML
150 INJECTION, SOLUTION INTRAVENOUS CONTINUOUS
Status: DISCONTINUED | OUTPATIENT
Start: 2020-01-14 | End: 2020-01-14 | Stop reason: HOSPADM

## 2020-01-14 RX ORDER — SODIUM CHLORIDE, SODIUM LACTATE, POTASSIUM CHLORIDE, CALCIUM CHLORIDE 600; 310; 30; 20 MG/100ML; MG/100ML; MG/100ML; MG/100ML
150 INJECTION, SOLUTION INTRAVENOUS CONTINUOUS
Status: DISCONTINUED | OUTPATIENT
Start: 2020-01-14 | End: 2020-01-14

## 2020-01-14 RX ORDER — ONDANSETRON 2 MG/ML
4 INJECTION INTRAMUSCULAR; INTRAVENOUS ONCE
Status: DISCONTINUED | OUTPATIENT
Start: 2020-01-14 | End: 2020-01-14 | Stop reason: HOSPADM

## 2020-01-14 RX ORDER — ACETAMINOPHEN 10 MG/ML
1000 INJECTION, SOLUTION INTRAVENOUS EVERY 6 HOURS
Status: COMPLETED | OUTPATIENT
Start: 2020-01-14 | End: 2020-01-15

## 2020-01-14 RX ORDER — SUCCINYLCHOLINE CHLORIDE 100 MG/5ML
SYRINGE (ML) INTRAVENOUS AS NEEDED
Status: DISCONTINUED | OUTPATIENT
Start: 2020-01-14 | End: 2020-01-14 | Stop reason: HOSPADM

## 2020-01-14 RX ORDER — ONDANSETRON 2 MG/ML
INJECTION INTRAMUSCULAR; INTRAVENOUS AS NEEDED
Status: DISCONTINUED | OUTPATIENT
Start: 2020-01-14 | End: 2020-01-14 | Stop reason: HOSPADM

## 2020-01-14 RX ORDER — LIDOCAINE HYDROCHLORIDE 20 MG/ML
INJECTION, SOLUTION EPIDURAL; INFILTRATION; INTRACAUDAL; PERINEURAL AS NEEDED
Status: DISCONTINUED | OUTPATIENT
Start: 2020-01-14 | End: 2020-01-14 | Stop reason: HOSPADM

## 2020-01-14 RX ORDER — NALOXONE HYDROCHLORIDE 0.4 MG/ML
0.1 INJECTION, SOLUTION INTRAMUSCULAR; INTRAVENOUS; SUBCUTANEOUS AS NEEDED
Status: DISCONTINUED | OUTPATIENT
Start: 2020-01-14 | End: 2020-01-14 | Stop reason: HOSPADM

## 2020-01-14 RX ORDER — GLYCOPYRROLATE 0.2 MG/ML
INJECTION INTRAMUSCULAR; INTRAVENOUS AS NEEDED
Status: DISCONTINUED | OUTPATIENT
Start: 2020-01-14 | End: 2020-01-14 | Stop reason: HOSPADM

## 2020-01-14 RX ORDER — PROPOFOL 10 MG/ML
INJECTION, EMULSION INTRAVENOUS AS NEEDED
Status: DISCONTINUED | OUTPATIENT
Start: 2020-01-14 | End: 2020-01-14 | Stop reason: HOSPADM

## 2020-01-14 RX ORDER — DEXTROSE MONOHYDRATE 100 MG/ML
125-250 INJECTION, SOLUTION INTRAVENOUS AS NEEDED
Status: DISCONTINUED | OUTPATIENT
Start: 2020-01-14 | End: 2020-01-14 | Stop reason: HOSPADM

## 2020-01-14 RX ORDER — SODIUM CHLORIDE 0.9 % (FLUSH) 0.9 %
5-40 SYRINGE (ML) INJECTION AS NEEDED
Status: DISCONTINUED | OUTPATIENT
Start: 2020-01-14 | End: 2020-01-14

## 2020-01-14 RX ADMIN — SODIUM CHLORIDE, SODIUM LACTATE, POTASSIUM CHLORIDE, AND CALCIUM CHLORIDE 150 ML/HR: 600; 310; 30; 20 INJECTION, SOLUTION INTRAVENOUS at 05:15

## 2020-01-14 RX ADMIN — SODIUM CHLORIDE, SODIUM LACTATE, POTASSIUM CHLORIDE, AND CALCIUM CHLORIDE: 600; 310; 30; 20 INJECTION, SOLUTION INTRAVENOUS at 09:08

## 2020-01-14 RX ADMIN — Medication 100 MG: at 09:01

## 2020-01-14 RX ADMIN — Medication: at 16:51

## 2020-01-14 RX ADMIN — HYDROMORPHONE HYDROCHLORIDE 0.5 MG: 1 INJECTION, SOLUTION INTRAMUSCULAR; INTRAVENOUS; SUBCUTANEOUS at 06:27

## 2020-01-14 RX ADMIN — CEFAZOLIN SODIUM 2 G: 2 SOLUTION INTRAVENOUS at 15:38

## 2020-01-14 RX ADMIN — ACETAMINOPHEN 1000 MG: 10 INJECTION, SOLUTION INTRAVENOUS at 21:48

## 2020-01-14 RX ADMIN — CEFAZOLIN SODIUM 2 G: 2 SOLUTION INTRAVENOUS at 22:19

## 2020-01-14 RX ADMIN — Medication: at 12:12

## 2020-01-14 RX ADMIN — CEFAZOLIN SODIUM 2 G: 2 SOLUTION INTRAVENOUS at 09:03

## 2020-01-14 RX ADMIN — ONDANSETRON HYDROCHLORIDE 4 MG: 2 INJECTION INTRAMUSCULAR; INTRAVENOUS at 10:39

## 2020-01-14 RX ADMIN — HYDROMORPHONE HYDROCHLORIDE 0.5 MG: 1 INJECTION, SOLUTION INTRAMUSCULAR; INTRAVENOUS; SUBCUTANEOUS at 04:03

## 2020-01-14 RX ADMIN — SUGAMMADEX 180 MG: 100 INJECTION, SOLUTION INTRAVENOUS at 10:44

## 2020-01-14 RX ADMIN — FENTANYL CITRATE 50 MCG: 50 INJECTION, SOLUTION INTRAMUSCULAR; INTRAVENOUS at 08:55

## 2020-01-14 RX ADMIN — SODIUM CHLORIDE, SODIUM LACTATE, POTASSIUM CHLORIDE, AND CALCIUM CHLORIDE 150 ML/HR: 600; 310; 30; 20 INJECTION, SOLUTION INTRAVENOUS at 11:41

## 2020-01-14 RX ADMIN — KETOROLAC TROMETHAMINE 15 MG: 15 INJECTION, SOLUTION INTRAMUSCULAR; INTRAVENOUS at 19:49

## 2020-01-14 RX ADMIN — LIDOCAINE HYDROCHLORIDE 100 MG: 20 INJECTION, SOLUTION EPIDURAL; INFILTRATION; INTRACAUDAL; PERINEURAL at 09:01

## 2020-01-14 RX ADMIN — ACETAMINOPHEN 1000 MG: 10 INJECTION, SOLUTION INTRAVENOUS at 09:23

## 2020-01-14 RX ADMIN — KETOROLAC TROMETHAMINE 15 MG: 15 INJECTION, SOLUTION INTRAMUSCULAR; INTRAVENOUS at 14:11

## 2020-01-14 RX ADMIN — SODIUM CHLORIDE, SODIUM LACTATE, POTASSIUM CHLORIDE, AND CALCIUM CHLORIDE 150 ML/HR: 600; 310; 30; 20 INJECTION, SOLUTION INTRAVENOUS at 21:54

## 2020-01-14 RX ADMIN — KETAMINE HYDROCHLORIDE 30 MG: 50 INJECTION, SOLUTION, CONCENTRATE INTRAMUSCULAR; INTRAVENOUS at 09:01

## 2020-01-14 RX ADMIN — KETOROLAC TROMETHAMINE 30 MG: 15 INJECTION, SOLUTION INTRAMUSCULAR; INTRAVENOUS at 10:37

## 2020-01-14 RX ADMIN — PROMETHAZINE HYDROCHLORIDE 12.5 MG: 25 INJECTION, SOLUTION INTRAMUSCULAR; INTRAVENOUS at 07:46

## 2020-01-14 RX ADMIN — SODIUM CHLORIDE, SODIUM LACTATE, POTASSIUM CHLORIDE, AND CALCIUM CHLORIDE 1000 ML: 600; 310; 30; 20 INJECTION, SOLUTION INTRAVENOUS at 04:00

## 2020-01-14 RX ADMIN — PROPOFOL 150 MG: 10 INJECTION, EMULSION INTRAVENOUS at 09:01

## 2020-01-14 RX ADMIN — GLYCOPYRROLATE 0.3 MG: 0.2 INJECTION INTRAMUSCULAR; INTRAVENOUS at 10:42

## 2020-01-14 RX ADMIN — Medication 10 MG: at 09:45

## 2020-01-14 RX ADMIN — Medication 50 MG: at 09:22

## 2020-01-14 RX ADMIN — SODIUM CHLORIDE, SODIUM LACTATE, POTASSIUM CHLORIDE, AND CALCIUM CHLORIDE 150 ML/HR: 600; 310; 30; 20 INJECTION, SOLUTION INTRAVENOUS at 14:10

## 2020-01-14 RX ADMIN — ACETAMINOPHEN 1000 MG: 10 INJECTION, SOLUTION INTRAVENOUS at 14:10

## 2020-01-14 RX ADMIN — HYDROMORPHONE HYDROCHLORIDE 1 MG: 1 INJECTION, SOLUTION INTRAMUSCULAR; INTRAVENOUS; SUBCUTANEOUS at 07:44

## 2020-01-14 RX ADMIN — FENTANYL CITRATE 50 MCG: 50 INJECTION, SOLUTION INTRAMUSCULAR; INTRAVENOUS at 09:24

## 2020-01-14 RX ADMIN — ACETAMINOPHEN 1000 MG: 10 INJECTION, SOLUTION INTRAVENOUS at 08:53

## 2020-01-14 RX ADMIN — KETAMINE HYDROCHLORIDE 20 MG: 50 INJECTION, SOLUTION, CONCENTRATE INTRAMUSCULAR; INTRAVENOUS at 09:42

## 2020-01-14 RX ADMIN — MIDAZOLAM 2 MG: 1 INJECTION INTRAMUSCULAR; INTRAVENOUS at 08:53

## 2020-01-14 NOTE — PERIOP NOTES
TRANSFER - IN REPORT:    Verbal report received from DR. Salcedo , OR nurse  on [facility]  being received from OR (unit) for routine progression of care      Report consisted of patients Situation, Background, Assessment and   Recommendations(SBAR). Information from the following report(s) OR Summary, Procedure Summary, Intake/Output and MAR was reviewed with the receiving nurse. Opportunity for questions and clarification was provided. Assessment completed upon patients arrival to unit and care assumed.

## 2020-01-14 NOTE — PERIOP NOTES
TRANSFER - OUT REPORT:    Verbal report given to Andrez Bingham (name) on Rosa Fiddler  being transferred to 3 S (unit) for routine progression of care       Report consisted of patients Situation, Background, Assessment and   Recommendations(SBAR). Information from the following report(s) OR Summary, Procedure Summary, Intake/Output and MAR was reviewed with the receiving nurse. Lines:   Peripheral IV 01/14/20 Anterior;Left;Proximal Antecubital (Active)   Site Assessment Clean, dry, & intact 1/14/2020 11:00 AM   Phlebitis Assessment 0 1/14/2020 11:00 AM   Infiltration Assessment 0 1/14/2020 11:00 AM   Dressing Status Clean, dry, & intact 1/14/2020 11:00 AM   Dressing Type Transparent;Tape 1/14/2020 11:00 AM   Hub Color/Line Status Infusing 1/14/2020 11:00 AM   Action Taken Open ports on tubing capped 1/14/2020  4:19 AM   Alcohol Cap Used Yes 1/14/2020  7:13 AM        Opportunity for questions and clarification was provided.       Patient transported with:   O2 @ 2 liters  Registered Nurse

## 2020-01-14 NOTE — PROGRESS NOTES
Problem: Falls - Risk of  Goal: *Absence of Falls  Description  Document Reanna Hines Fall Risk and appropriate interventions in the flowsheet. Outcome: Progressing Towards Goal  Note: Fall Risk Interventions:  Mobility Interventions: Assess mobility with egress test, Communicate number of staff needed for ambulation/transfer, Patient to call before getting OOB         Medication Interventions: Evaluate medications/consider consulting pharmacy, Teach patient to arise slowly, Patient to call before getting OOB    Elimination Interventions: Call light in reach, Patient to call for help with toileting needs, Stay With Me (per policy), Toileting schedule/hourly rounds              Problem: Patient Education: Go to Patient Education Activity  Goal: Patient/Family Education  Outcome: Progressing Towards Goal  Note:   Pt and family very engaged in care. Problem: Pain  Goal: *Control of Pain  Outcome: Progressing Towards Goal  Note:   Pt appears very comfortable; has not required or attempted PCA dose. Drowsy, but responsive to voice and able to follow commands. MD aware. Problem: Patient Education: Go to Patient Education Activity  Goal: Patient/Family Education  Outcome: Progressing Towards Goal     Problem: Patient Education: Go to Patient Education Activity  Goal: Patient/Family Education  Outcome: Progressing Towards Goal     Problem: Surgical Pathway Day of Surgery  Goal: Activity/Safety  Outcome: Progressing Towards Goal  Goal: Nutrition/Diet  Outcome: Progressing Towards Goal  Goal: Medications  Outcome: Progressing Towards Goal  Goal: Respiratory  Outcome: Progressing Towards Goal  Note:   Lungs CTA; weaned readily to RA. IS instructions reviewed; has not attempted yet.   Goal: Treatments/Interventions/Procedures  Outcome: Progressing Towards Goal  Goal: Psychosocial  Outcome: Progressing Towards Goal  Goal: *No signs and symptoms of infection or wound complications  Outcome: Progressing Towards Goal  Goal: *Adequate urinary output (equal to or greater than 30 milliliters/hour)  Description  Ambulatory Surgery patients voiding without difficulty. Outcome: Progressing Towards Goal  Goal: *Hemodynamically stable  Outcome: Progressing Towards Goal  Goal: *Tolerating diet  Outcome: Progressing Towards Goal  Goal: *Demonstrates progressive activity  Outcome: Progressing Towards Goal  Note:   Has not ambulated since procedure as is very drowsy. Receiving RN aware.

## 2020-01-14 NOTE — ANESTHESIA PREPROCEDURE EVALUATION
Relevant Problems   No relevant active problems       Anesthetic History     PONV          Review of Systems / Medical History  Patient summary reviewed, nursing notes reviewed and pertinent labs reviewed    Pulmonary  Within defined limits                 Neuro/Psych   Within defined limits           Cardiovascular                  Exercise tolerance: >4 METS     GI/Hepatic/Renal     GERD           Endo/Other        Morbid obesity     Other Findings              Physical Exam    Airway  Mallampati: II  TM Distance: 4 - 6 cm  Neck ROM: normal range of motion   Mouth opening: Normal     Cardiovascular  Regular rate and rhythm,  S1 and S2 normal,  no murmur, click, rub, or gallop             Dental  No notable dental hx       Pulmonary  Breath sounds clear to auscultation               Abdominal  GI exam deferred       Other Findings            Anesthetic Plan    ASA: 2  Anesthesia type: general          Induction: Intravenous  Anesthetic plan and risks discussed with: Patient

## 2020-01-14 NOTE — PROGRESS NOTES
TRANSFER - IN REPORT:    Verbal report received from Suzan Ochoa RN(name) on Cristian Jones  being received from ED @ [facility] (unit) for routine progression of care      Report consisted of patients Situation, Background, Assessment and   Recommendations(SBAR). Information from the following report(s) SBAR, Kardex, ED Summary, [facility] and Recent Results was reviewed with the receiving nurse. Opportunity for questions and clarification was provided. Assessment completed upon patients arrival to unit and care assumed.

## 2020-01-14 NOTE — ANESTHESIA POSTPROCEDURE EVALUATION
Procedure(s):  LAPAROSCOPY, OPEN LAPAROTOMTY, ILEOCECAL RESECTION. general    Anesthesia Post Evaluation      Multimodal analgesia: multimodal analgesia used between 6 hours prior to anesthesia start to PACU discharge  Patient location during evaluation: PACU  Patient participation: complete - patient participated  Level of consciousness: awake  Pain management: adequate  Airway patency: patent  Anesthetic complications: no  Cardiovascular status: acceptable  Respiratory status: acceptable  Hydration status: acceptable  Post anesthesia nausea and vomiting:  none      Vitals Value Taken Time   /82 1/14/2020 11:40 AM   Temp 36.9 °C (98.4 °F) 1/14/2020 11:29 AM   Pulse 80 1/14/2020 11:43 AM   Resp 18 1/14/2020 11:43 AM   SpO2 100 % 1/14/2020 11:43 AM   Vitals shown include unvalidated device data.

## 2020-01-14 NOTE — PROGRESS NOTES
Summary -- Pt comfortable upon return from surgery. Has not required or attempted PCA dose. Responsive to voice; ETCO2 WDL. Araujo draining well; urine straw-colored and clear. Very engaged family at bedside. No new concerns/complaints noted. Patient Vitals for the past 4 hrs:   Temp Pulse Resp BP SpO2   01/14/20 1538 99.2 °F (37.3 °C) 67 14 114/67 100 %   01/14/20 1426 99.2 °F (37.3 °C) 68 14 114/73 100 %   01/14/20 1332 98.7 °F (37.1 °C) 71 16 118/73 100 %       Shift change report given to FRANCIS Waters (oncoming nurse) by Arielle Hernandez RN (offgoing nurse). Report included the following information SBAR, Kardex, ED Summary, OR Summary, Intake/Output, MAR and Recent Results.

## 2020-01-14 NOTE — INTERVAL H&P NOTE
H&P Update:  Kymberly Lester was seen and examined. History and physical has been reviewed. The patient has been examined.  There have been no significant clinical changes since the completion of the originally dated History and Physical.

## 2020-01-14 NOTE — PERIOP NOTES
TRANSFER - IN REPORT:    Verbal report received from FRANCIS Denny(name) on Ana Zack  being received from 3S(unit) for ordered procedure      Report consisted of patients Situation, Background, Assessment and   Recommendations(SBAR). Information from the following report(s) SBAR, Intake/Output, MAR and Recent Results was reviewed with the receiving nurse. Opportunity for questions and clarification was provided. Assessment completed upon patients arrival to unit and care assumed.

## 2020-01-14 NOTE — NURSE NAVIGATOR
Patient sleeping during this visit. Patient's  and Papo Ware RN, at bedside. Papo Ware confirmed lap sites without new drainage, erythema, or swelling. No needs at this time.

## 2020-01-14 NOTE — ROUTINE PROCESS
TRANSFER - IN REPORT:    Verbal report received from [facility] (name) on Milton Casanova  being received from PACU (unit) for routine post - op      Report consisted of patients Situation, Background, Assessment and   Recommendations(SBAR). Information from the following report(s) SBAR, Kardex, OR Summary, Intake/Output, MAR and Recent Results was reviewed with the receiving nurse. Opportunity for questions and clarification was provided. Assessment completed upon patients arrival to unit and care assumed.

## 2020-01-14 NOTE — ROUTINE PROCESS
Bedside shift change report given to Mundo Child RN (oncoming nurse) by Lela Flores RN (offgoing nurse). Report included the following information SBAR, Kardex, ED Summary, Intake/Output, MAR and Recent Results.

## 2020-01-14 NOTE — PROGRESS NOTES
Transition of Care (ENDY) Plan:    Physician follow up     Chart reviewed. Pt admitted for an elective surgical procedure (LAPAROSCOPY, OPEN LAPAROTOMTY, ILEOCECAL RESECTION). Pt is independent. Please encourage ambulation. No transition of care needs identified at this time. Anticipate pt will be medically stable for discharge within the next 24-48 hours with physician follow up. CM available to assist as needed. ENDY Transportation:   How is patient being transported at discharge? Family/Friend      When? Once cleared by physician     Is transport scheduled? N/A      Follow-up appointment and transportation:   PCP/Specialist?  See AVS for Appointment         Who is transporting to the follow-up appointment? Self/Family/Friend      Is transport for follow up appointment scheduled? N/A    Communication plan (with patient/family): Who is being called? Patient or Next of Kin? Responsible party? Patient      What number(s) is to be used? See Facesheet      What service provider is calling for [facility] services? When are they calling? Readmission Risk? (Green/Low; Yellow/Moderate; Red/High):  Green      Care Management Interventions  Mode of Transport at Discharge:  Other (see comment)(Family)  Transition of Care Consult (CM Consult): Discharge Planning  Health Maintenance Reviewed: Yes  Current Support Network: Lives with Spouse  The Plan for Transition of Care is Related to the Following Treatment Goals : home with physician follow up   Discharge Location  Discharge Placement: Home with family assistance

## 2020-01-14 NOTE — ROUTINE PROCESS
TRANSFER - OUT REPORT:    Verbal report given to Len Morton RN (name) on Jeet Carpenter  being transferred to OR (unit) for ordered procedure       Report consisted of patients Situation, Background, Assessment and   Recommendations(SBAR). Information from the following report(s) SBAR, Kardex, ED Summary, Intake/Output, MAR and Recent Results was reviewed with the receiving nurse. Lines:   Peripheral IV 01/14/20 Anterior;Left;Proximal Antecubital (Active)   Site Assessment Clean, dry, & intact 1/14/2020  7:13 AM   Phlebitis Assessment 0 1/14/2020  7:13 AM   Infiltration Assessment 0 1/14/2020  7:13 AM   Dressing Status Clean, dry, & intact 1/14/2020  7:13 AM   Dressing Type Transparent 1/14/2020  7:13 AM   Hub Color/Line Status Pink; Infusing;Patent 1/14/2020  7:13 AM   Action Taken Open ports on tubing capped 1/14/2020  4:19 AM   Alcohol Cap Used Yes 1/14/2020  7:13 AM        Opportunity for questions and clarification was provided.       Patient transported with:  LR @ 150

## 2020-01-14 NOTE — BRIEF OP NOTE
BRIEF OPERATIVE NOTE    Date of Procedure: 1/14/2020   Preoperative Diagnosis: BOWEL OBSTRUCTION  Postoperative Diagnosis: BOWEL OBSTRUCTION    Procedure(s): 1. LAPAROSCOPY  2. OPEN LAPAROTOMY  3.DISTAL SMALL BOWEL RESECTION  4. ILEOCECECECTOMY WITH ILEOCOLIC ANASTAMOSIS  Surgeon(s) and Role:     * Jeff Villalobos MD - Primary         Surgical Assistant: Caitlyn Michelle    Surgical Staff:  Circ-1: Kailee Bill RN  Physician Assistant: CHIKIS Stubbs  Scrub Tech-1: Milagro Goodwin  Surg Asst-1: Sonal Fonseca  Event Time In Time Out   Incision Start 0920    Incision Close 1046      Anesthesia: General   Estimated Blood Loss: 50cc  Specimens:   ID Type Source Tests Collected by Time Destination   1 : Ileocolic intussusception  Preservative Colon, Right  Jeff Villalobos MD 1/14/2020 1010 Pathology      Findings: massive ileocecal intususception   Complications: none  Implants: * No implants in log *

## 2020-01-14 NOTE — H&P
Admission / Surgical - History and Physical    Subjective: The patient is a 38 y.o. obese female who has undergone the following procedures;     March 5990 - uncomplicated gastric bypass by Dr. Norma Eric  Oct 2018 - dx lap'scope for abd pain / lysis of adhesions (benign findings)  Jan 2019 - dx lap'scope for abd pain / lysis of adhesions (benign findings) by Dr. Alec Childs at [facility]  April 2019 - laparoscopic repair of internal hernia by Dr. Norma Eric    She was last seen in the office in late October 2019 and despite her complex surgical history she was doing well with a BMI of 27 and 100 lbs of weight loss. She was doing well with no issues until she starting have diffuse abdominal pain (without vomiting) about 24 hours ago. She called the office and given her surgical history was instructed to come to the [facility] ER for evaluation. She instead went to a [facility] facility where a work-up of her abdominal pain included radiologic studies that showed \"Dilated loops of small bowel in the mid to upper left side of the abdomen which is suspicious for a small bowel obstruction\". Aside from a slight anemia the rest of her lab work was normal with a WBC count of 4.7K. Dr. Norma Eric was contacted and agreed to take the patient in transfer.     Bariatric comorbidities continue to include:   Patient Active Problem List   Diagnosis Code    Constipation K59.00    History of colon polyps Z86.010    Intestinal malabsorption K90.9    S/P gastric bypass Z98.84    Hypokalemia E87.6    Hypomagnesemia E83.42    Iron deficiency E61.1    Pica F50.89    Intertriginous candidiasis B37.2    Internal hernia K45.8        Patient Active Problem List    Diagnosis Date Noted    Internal hernia 10/28/2018    Iron deficiency 10/16/2018    Pica 10/16/2018    Intertriginous candidiasis 10/16/2018    Hypokalemia 06/06/2018    Hypomagnesemia 06/06/2018    Intestinal malabsorption 04/19/2018    S/P gastric bypass 04/19/2018  Constipation     History of colon polyps       Past Surgical History:   Procedure Laterality Date    ABDOMEN SURGERY PROC UNLISTED      adhensions on liver    COLONOSCOPY      X 3    DELIVERY       X 3    HX  SECTION      HX COLECTOMY      HX DILATION AND CURETTAGE  2005    HX GASTRIC BYPASS      HX GI      gastric bypass    HX OTHER SURGICAL      scar resection back of head    HX TUBAL LIGATION      HX UROLOGICAL  2001    stent    IR URETERAL STENT PLACEMENT        Social History     Tobacco Use    Smoking status: Never Smoker    Smokeless tobacco: Never Used   Substance Use Topics    Alcohol use: Yes     Comment: rare      Family History   Problem Relation Age of Onset    Hypertension Mother     Obesity Mother     Hypertension Father       Current Facility-Administered Medications   Medication Dose Route Frequency Provider Last Rate Last Dose    lactated Ringers infusion  150 mL/hr IntraVENous CONTINUOUS Fidelia Vila  mL/hr at 20 0515 150 mL/hr at 20 0515    HYDROmorphone (PF) (DILAUDID) injection 0.5 mg  0.5 mg IntraVENous Q2H PRN Fidelia Vila MD   0.5 mg at 20 5588     Allergies   Allergen Reactions    Niacin Other (comments)    Morphine Hives and Itching        Review of Systems:        General - No history or complaints of unexpected fever, chills, or weight loss  Head/Neck - No history or complaints of headache, diplopia, dysphagia, hearing loss  Cardiac - No history or complaints of chest pain, palpitations, murmur, or shortness of breath  Pulmonary - No history or complaints of shortness of breath, productive cough, hemoptysis  Gastrointestinal - as above  Genitourinary - No history or complaints of hematuria/dysuria, stress urinary incontinence symptoms, or renal lithiasis  Musculoskeletal - No history or complaints of joint pain or muscular weakness  Hematologic - No history or complaints of bleeding disorders, blood transfusions, sickle cell anemia  Neurologic - No history or complaints of  migraine headaches, seizure activity, syncopal episodes, TIA or stroke  Integumentary - No history or complaints of rashes, abnormal nevi, skin cancer  Gynecological - unremarkable    Objective:     Visit Vitals  /60 (BP 1 Location: Right arm, BP Patient Position: At rest)   Pulse 64   Temp 98.9 °F (37.2 °C)   Resp 23   SpO2 100%       Physical Examination: General appearance - alert, well appearing, and in no distress  Mental status - alert, oriented to person, place, and time  Eyes - pupils equal and reactive, extraocular eye movements intact  Ears - bilateral TM's and external ear canals normal  Nose - normal and patent, no erythema, discharge or polyps  Mouth - mucous membranes moist, pharynx normal without lesions  Neck - supple, no significant adenopathy  Lymphatics - no palpable lymphadenopathy, no hepatosplenomegaly  Chest - clear to auscultation, no wheezes, rales or rhonchi, symmetric air entry  Heart - normal rate, regular rhythm, normal S1, S2, no murmurs, rubs, clicks or gallops  Abdomen - soft, nontender, nondistended, no masses or organomegaly  Back exam - full range of motion, no tenderness, palpable spasm or pain on motion  Neurological - alert, oriented, normal speech, no focal findings or movement disorder noted  Musculoskeletal - no joint tenderness, deformity or swelling  Extremities - peripheral pulses normal, no pedal edema, no clubbing or cyanosis  Skin - normal coloration and turgor, no rashes, no suspicious skin lesions noted    Labs / Preoperative Evaluations:     Recent Results (from the past 1008 hour(s))   HCG QL SERUM    Collection Time: 01/14/20  4:34 AM   Result Value Ref Range    HCG, Ql. NEGATIVE  NEG     CBC WITH AUTOMATED DIFF    Collection Time: 01/14/20  4:34 AM   Result Value Ref Range    WBC 5.2 4.6 - 13.2 K/uL    RBC 3.60 (L) 4.20 - 5.30 M/uL    HGB 10.7 (L) 12.0 - 16.0 g/dL    HCT 33.2 (L) 35.0 - 45.0 %    MCV 92.2 74.0 - 97.0 FL    MCH 29.7 24.0 - 34.0 PG    MCHC 32.2 31.0 - 37.0 g/dL    RDW 12.3 11.6 - 14.5 %    PLATELET 077 164 - 597 K/uL    MPV 10.0 9.2 - 11.8 FL    NEUTROPHILS 71 40 - 73 %    LYMPHOCYTES 21 21 - 52 %    MONOCYTES 8 3 - 10 %    EOSINOPHILS 0 0 - 5 %    BASOPHILS 0 0 - 2 %    ABS. NEUTROPHILS 3.7 1.8 - 8.0 K/UL    ABS. LYMPHOCYTES 1.1 0.9 - 3.6 K/UL    ABS. MONOCYTES 0.4 0.05 - 1.2 K/UL    ABS. EOSINOPHILS 0.0 0.0 - 0.4 K/UL    ABS. BASOPHILS 0.0 0.0 - 0.1 K/UL    DF AUTOMATED     METABOLIC PANEL, BASIC    Collection Time: 01/14/20  4:34 AM   Result Value Ref Range    Sodium 140 136 - 145 mmol/L    Potassium 3.6 3.5 - 5.5 mmol/L    Chloride 108 100 - 111 mmol/L    CO2 24 21 - 32 mmol/L    Anion gap 8 3.0 - 18 mmol/L    Glucose 93 74 - 99 mg/dL    BUN 9 7.0 - 18 MG/DL    Creatinine 0.56 (L) 0.6 - 1.3 MG/DL    BUN/Creatinine ratio 16 12 - 20      GFR est AA >60 >60 ml/min/1.73m2    GFR est non-AA >60 >60 ml/min/1.73m2    Calcium 8.4 (L) 8.5 - 10.1 MG/DL   MAGNESIUM    Collection Time: 01/14/20  4:34 AM   Result Value Ref Range    Magnesium 1.8 1.6 - 2.6 mg/dL       Assessment:     Approximately two years post gastric bypass with complex surgical history and 24 hours of acute abdominal pain with radiologic evidence of a small bowel obstruction. Plan:     Admission to the hospital with plans for diagnostic laparoscopy possible laparotomy for correction of recurrent bowel obstruction    The patient understands that once again this issues is of an anatomical nature and the best corrective treatment is with surgical intervention. She understands the concept of a bowel obstruction and realizes the risks of surgical intervention includes but is not limited to; death, DVT/PE, bleeding, infection, damage to surrounding structures and the possible need for resection of tissue.   She also understands there is the chance that given her surgical history that we may need to perform a laparotomy to correct her issues. She knows that this increases her overall risks and will result in a longer hospital stay and longer recovery period. She understands all of the above and wishes to proceed.     Signed By: CHIKIS Ocampo     January 14, 2020

## 2020-01-14 NOTE — PROGRESS NOTES
0330: Pt arrived on the unit via stretcher. Pt complaining of abdominal pain. Called Dr Carlos Banerjee for orders. Bolus started. Assessment completed. 0703: Pt's sister calling nurses station complaining about the pt's pain and wanting to know what is being done. They said they were going to book a flight here to make sure something gets done. Stated the pt is calling the sister constantly crying and moaning in pain. Patient Vitals for the past 12 hrs:   Temp Pulse Resp BP SpO2   01/14/20 0411 -- -- -- -- 100 %   01/14/20 0332 98.9 °F (37.2 °C) 64 23 114/60 100 %     Bedside and Verbal shift change report given to Trudy Kayser RN (oncoming nurse) by Ingrid Cleveland RN (offgoing nurse). Report included the following information SBAR, Kardex, Intake/Output, MAR and Recent Results.

## 2020-01-15 LAB
ANION GAP SERPL CALC-SCNC: 5 MMOL/L (ref 3–18)
BUN SERPL-MCNC: 5 MG/DL (ref 7–18)
BUN/CREAT SERPL: 9 (ref 12–20)
CALCIUM SERPL-MCNC: 7.7 MG/DL (ref 8.5–10.1)
CHLORIDE SERPL-SCNC: 109 MMOL/L (ref 100–111)
CO2 SERPL-SCNC: 28 MMOL/L (ref 21–32)
CREAT SERPL-MCNC: 0.58 MG/DL (ref 0.6–1.3)
ERYTHROCYTE [DISTWIDTH] IN BLOOD BY AUTOMATED COUNT: 12.4 % (ref 11.6–14.5)
GLUCOSE SERPL-MCNC: 94 MG/DL (ref 74–99)
HCT VFR BLD AUTO: 31.3 % (ref 35–45)
HGB BLD-MCNC: 10.3 G/DL (ref 12–16)
MAGNESIUM SERPL-MCNC: 1.3 MG/DL (ref 1.6–2.6)
MCH RBC QN AUTO: 29.8 PG (ref 24–34)
MCHC RBC AUTO-ENTMCNC: 32.9 G/DL (ref 31–37)
MCV RBC AUTO: 90.5 FL (ref 74–97)
PLATELET # BLD AUTO: 143 K/UL (ref 135–420)
PMV BLD AUTO: 10.1 FL (ref 9.2–11.8)
POTASSIUM SERPL-SCNC: 3.3 MMOL/L (ref 3.5–5.5)
RBC # BLD AUTO: 3.46 M/UL (ref 4.2–5.3)
SODIUM SERPL-SCNC: 142 MMOL/L (ref 136–145)
WBC # BLD AUTO: 4.4 K/UL (ref 4.6–13.2)

## 2020-01-15 PROCEDURE — C9113 INJ PANTOPRAZOLE SODIUM, VIA: HCPCS | Performed by: SPECIALIST

## 2020-01-15 PROCEDURE — 65270000029 HC RM PRIVATE

## 2020-01-15 PROCEDURE — 83735 ASSAY OF MAGNESIUM: CPT

## 2020-01-15 PROCEDURE — 36415 COLL VENOUS BLD VENIPUNCTURE: CPT

## 2020-01-15 PROCEDURE — 77010033678 HC OXYGEN DAILY

## 2020-01-15 PROCEDURE — 74011250636 HC RX REV CODE- 250/636: Performed by: SPECIALIST

## 2020-01-15 PROCEDURE — 85027 COMPLETE CBC AUTOMATED: CPT

## 2020-01-15 PROCEDURE — 80048 BASIC METABOLIC PNL TOTAL CA: CPT

## 2020-01-15 RX ADMIN — KETOROLAC TROMETHAMINE 15 MG: 15 INJECTION, SOLUTION INTRAMUSCULAR; INTRAVENOUS at 12:53

## 2020-01-15 RX ADMIN — SODIUM CHLORIDE 40 MG: 9 INJECTION, SOLUTION INTRAMUSCULAR; INTRAVENOUS; SUBCUTANEOUS at 09:36

## 2020-01-15 RX ADMIN — KETOROLAC TROMETHAMINE 15 MG: 15 INJECTION, SOLUTION INTRAMUSCULAR; INTRAVENOUS at 18:52

## 2020-01-15 RX ADMIN — KETOROLAC TROMETHAMINE 15 MG: 15 INJECTION, SOLUTION INTRAMUSCULAR; INTRAVENOUS at 05:07

## 2020-01-15 RX ADMIN — SODIUM CHLORIDE, SODIUM LACTATE, POTASSIUM CHLORIDE, AND CALCIUM CHLORIDE 150 ML/HR: 600; 310; 30; 20 INJECTION, SOLUTION INTRAVENOUS at 05:06

## 2020-01-15 RX ADMIN — Medication: at 07:49

## 2020-01-15 RX ADMIN — KETOROLAC TROMETHAMINE 15 MG: 15 INJECTION, SOLUTION INTRAMUSCULAR; INTRAVENOUS at 00:05

## 2020-01-15 RX ADMIN — ACETAMINOPHEN 1000 MG: 10 INJECTION, SOLUTION INTRAVENOUS at 03:00

## 2020-01-15 NOTE — PROGRESS NOTES
DC Plan: Discharge home with MD follow up, family assistance once medically stable    Chart reviewed. Pt admitted to medical by MD Becky Richards. Met with pt and her  at bedside to discuss dc plan. Pt lives with ,  to drive home at discharge. Home address confirmed per face sheet. Pt independent and denies using DME. Pt has follow up appt with MD Becky Richards per AVS.  Pt states her PCP is @ [facility], name unknown. Pts only voiced concern is getting a work note, MD Urban's RN Jo Alcala made aware. CM will cont to follow for transition of care needs, [phone]      Transition of Care (ENDY) Plan:          Pt admitted for an elective surgical procedure. Pt is independent. Please encourage ambulation. No transition of care needs identified at this time. Anticipate pt will be medically stable for discharge within the next 24-48 hours with physician follow up. CM available to assist as needed. ENDY Transportation:   How is patient being transported at discharge? Family/Friend      When? Once cleared by physician     Is transport scheduled? N/A      Follow-up appointment and transportation:   PCP/Specialist?  See AVS for Appointment         Who is transporting to the follow-up appointment? Self/Family/Friend      Is transport for follow up appointment scheduled? N/A    Communication plan (with patient/family): Who is being called? Patient or Next of Kin? Responsible party? Patient      What number(s) is to be used? See Facesheet      What service provider is calling for [facility] services? When are they calling? Readmission Risk? (Green/Low; Yellow/Moderate; Red/High):  Green    Care Management Interventions  Mode of Transport at Discharge:  Other (see comment)(Family)  Transition of Care Consult (CM Consult): Discharge Planning  Health Maintenance Reviewed: Yes  Current Support Network: Lives with Spouse  The Plan for Transition of Care is Related to the Following Treatment Goals : home with physician follow up   Discharge Location  Discharge Placement: Home with family assistance

## 2020-01-15 NOTE — PROGRESS NOTES
Bariatric Surgery                POD #1    Visit Vitals  /72 (BP 1 Location: Right arm, BP Patient Position: At rest)   Pulse 81   Temp 99 °F (37.2 °C)   Resp 18   Wt 70.1 kg (154 lb 9.6 oz)   SpO2 100%   BMI 32.31 kg/m²     Patient has minimal complaints of pain. Extremely good urine output     Exam:  Appears well in no distress  Lungs- clear bilaterally  Abd - soft, incisions look good without erythema  Extremities- no new edema or swelling      Data Review:    Labs: Results:       Chemistry Recent Labs     01/14/20  0434   GLU 93      K 3.6      CO2 24   BUN 9   CREA 0.56*   CA 8.4*   AGAP 8   BUCR 16      CBC w/Diff Recent Labs     01/14/20  0434   WBC 5.2   RBC 3.60*   HGB 10.7*   HCT 33.2*      GRANS 71   LYMPH 21   EOS 0      Coagulation No results for input(s): PTP, INR, APTT, INREXT in the last 72 hours. Liver Enzymes No results for input(s): TP, ALB, TBIL, AP, SGOT, GPT in the last 72 hours. No lab exists for component: DBIL       Assessment/Plan: S/P  Distal small bowel/ileocecectomy - doing well without any issues    1. Start clears  2. Cont IV PCA  3. OOB and ambulate

## 2020-01-15 NOTE — PROGRESS NOTES
PCA rate verify with Daniel Ford RN that showed dose 0.2 with  10 min delay   And an hourly dose of 1.2 mg/hr. Total dose given at this time 0.6 mg, wih 4 demands and 3 delivered. 8265  PCA reviewed and no demands or deliveries given. 0.6 mg total maintained and no distress.

## 2020-01-15 NOTE — PROGRESS NOTES
80 Pt received from offgoing nurse without any signs or symptoms of distress. Pt vitals are stable and within normal limits. Pt bed in low position with wheels locked and call bell within reach. 1727 Assessment completed and documented in flow sheet. Pt denies any further needs at this time. Pt in NAD with bed in low position, wheels locked and call bell within reach. 1949 Scheduled medications administered as ordered. Pt asking to get out of bed to walk. Asked pt if she is able to open her eyes and keep them open as she is very drowsy post surgery. Pt states she cannot keep her eyes open at this time. Pt educated on the need to keep her eyes open in order to walk so she can see where she is going. Pt stated she will wait on walking then. 2148 Scheduled medications administered as ordered. 2219 Scheduled medications administered as ordered. Reassessment completed with no changes noted. Bed locked, in lowest position, with call light within reach. 2315 Bedside and Verbal shift change report given to JOSI Senior RN (oncoming nurse) by Adelaida Keith RN   (offgoing nurse). Report included the following information SBAR, Intake/Output, MAR and Recent Results.

## 2020-01-15 NOTE — ROUTINE PROCESS
Bedside and Verbal shift change report given to MARJORIE Graves RN  (oncoming nurse) by  DANNY Senior RN  (offgoing nurse). Report included the following information SBAR, Kardex, OR Summary, Intake/Output, MAR and Recent Results.

## 2020-01-15 NOTE — ROUTINE PROCESS
1539- Bedside report received from Lesly 3. Patient in bed at this time. Pain 2/10. Plan of care for the day addressed with the patient.

## 2020-01-15 NOTE — PROGRESS NOTES
Problem: Falls - Risk of  Goal: *Absence of Falls  Description  Document Blanca Wiggins Fall Risk and appropriate interventions in the flowsheet.   Outcome: Progressing Towards Goal  Note: Fall Risk Interventions:  Mobility Interventions: Communicate number of staff needed for ambulation/transfer, Patient to call before getting OOB, Utilize walker, cane, or other assistive device         Medication Interventions: Evaluate medications/consider consulting pharmacy, Patient to call before getting OOB, Teach patient to arise slowly, Bed/chair exit alarm    Elimination Interventions: Bed/chair exit alarm, Call light in reach, Patient to call for help with toileting needs, Toileting schedule/hourly rounds

## 2020-01-15 NOTE — OP NOTES
[facility]  OPERATIVE REPORT    Name:  Christiano Saldana  MR#:   [id number]  :  1980  ACCOUNT #:  [de-identified]  DATE OF SERVICE:  2020    PREOPERATIVE DIAGNOSIS:  Small bowel obstruction. POSTOPERATIVE DIAGNOSES:  Small bowel obstruction due to massive distal small bowel and ileocecal intussusception. PROCEDURE PERFORMED:  1. Diagnostic laparoscopy. 2.  Open laparotomy with lysis of adhesions. 3.  Distal small bowel resection. 4.  Ileocecal resection with anastomosis. SURGEON:  Addis Hernandez MD    ASSISTANT:   CHIKIS Celis.  Mervin DIOP assisted with the procedure since there were no qualified surgeons, interns, or residents available. He assisted with exposure during the procedure, adhesiolysis, resection of distal small bowel, resection of ileocecal region and anastomosis and closure of abdomen and skin. ANESTHESIA:  General endotracheal.    COMPLICATIONS:  None. SPECIMENS REMOVED:  1. Distal small bowel resection. 2.  Ileocecal resection. IMPLANTS:  None. ESTIMATED BLOOD LOSS:  Approximately 50 mL. INDICATIONS:  The patient is a patient of mine who is couple of years out from a gastric bypass. PROCEDURE. She has had a very unusual course in which she had initially a laparoscopy early after her gastric bypass due to a partial obstruction which was due to adhesive disease. She then underwent a second exploration by another surgeon and that exploration was negative, and then finally in April of this past year, she underwent a third laparoscopy due to persistent pain and CT abnormality, and she was found to have an internal hernia which was repaired. She was doing absolutely perfect for the past 9 months but then on , was feeling slightly bad, was performing a fast she stated for Sabianism reasons, but then when she started eating, she noticed she had extreme abdominal pain and distention and ultimately, she called me late Monday night. I had told her that if her pain got worse, she need to go to the emergency room and unfortunately, she went to [facility] emergency room, and they called me after CT showed an obstruction present. The surgeon there was not comfortable with operating on her, therefore, she was transferred to my care. On review of her CT scan and a KUB, indeed did have obstruction of her small bowel of unclear etiology. We talked with her about the various possibilities of bowel obstruction, I felt like there would be no way she would have a recurrent internal hernia and might be related to adhesive disease. I did recommend immediate exploration at this time. It should be noted that her lab work was essentially normal except there was some mild anemia. PROCEDURE:  The patient was brought to the operating room, placed on the table in the supine position, at which time, general anesthesia was administered without any difficulty. Her abdomen was then prepped and draped in the usual sterile fashion. Using a 15-blade, a 1-cm incision was made just to the left of the umbilicus. Veress needle approach was used to gain access into the peritoneal cavity, which was then insufflated. Geoff Rad was then placed through that site. On entering the abdomen with the scope, I noted immediately, it appeared that her upper gastrointestinal tract of her gastric bypass was fairly normal.  I placed 2 trocars in the right upper quadrant region. I began examining her gastric pouch which was normal.  The Adelfo limb was totally normal, it was not dilated. I marked this out distally towards the Adelfo limb anastomosis and that was likewise normal, and there was no internal hernia present. I could examine the afferent limb and the efferent limb, and it was all normal and not dilated.   As I traced the small bowel up distally about longterm down the ileum, I noted there was some significant dilation and inspissated material within the small bowel, and at that point, I could no longer examine the small bowel, though it did appear to enter some area, which I thought possibly might be some type of paracolic hernia. I could not expose this area at all due to massive dilation of her small bowel and I was concerned that would create an enterotomy if one day I would have to convert to an open operation. I removed all trocars. All counts were correct. I then formed a midline incision from the umbilicus towards the pubis. I carried this down through the subcutaneous tissue to the fascia and divided in midline near the peritoneum. On entering the peritoneum, there was significantly distended ileum present. I began to find the transition point which was about mid ileal region and mid distally. There was a point where I could not bring the ileum into the wound at all. I could palpate that there was a mass effect in the right paracolic region, and I felt like after digital manipulation of this area that it was likely a massive intussusception. I had to extend the incision slightly. This allowed me to then introduce the ileocecal region into the operative field and indeed there was a massive intussusception with a significant portion of the ileum intussuscepted within the colon about midway up the right colon. I made numerous attempts to try to release this. It was a possible release such, I knew I would have to perform a resection. Prior to proceeding with this, I called the colorectal surgeon, Dr. Kevin Plascencia. I discussed the case with her and she suggested since I could not release, so we go ahead and resect it. We began by locating the area along the mid right colon just above the intussuscepted region. I exposed the mesentery and then used a MONAE 75 stapler to transect the colon at this point.   I then began to march along the mesentery of the right colon towards the ileal region taking down the vessels individually and this is where it was very difficult due to the intussuscepted mesentery of the small bowel. We had to take down these vessels individually, very difficult to manage since they were so intussuscepted, and I had to take down on the white line of Toldt to reflect this laterally to help facilitate exposure. In a very arduous fashion, we took down the individual vessels with both suture ties stick ties and finally, we were able to expose the distal ileum. We transected the distal ileum using a MONAE stapler and performed what was essentially about an 8-inch resection of distal small bowel. We were finally able to deliver the specimen out of the operative field and submitted to Pathology for permanent section. We then checked hemostasis along the entire mesentery, it was intact. All the other structures were lateral and posterior such as the ureter. They were identified and non-injured, and I went and placed the mal ileum which was minimally dilated in a side-to-side fashion with the ascending colon. We placed stay sutures proximally and distally, created enterotomies, and using the same MONAE stapler created a linear anastomosis. We then reapproximated free margin of the enterotomy and then closed this using a TA-60 stapler. Some sutures were placed proximally and distally for any rotation and then we closed the mesenteric defect using a series of figure-of-eight sutures. We then irrigated the abdomen using copious amounts of normal saline solution. All areas were hemostatic. We likewise had no spillage of any type of enteric contents during this operation. After turning everything intra-abdominal, the small bowel was now self-decompressing itself into the ascending colon. We then closed the midline incision using a running looped #1 PDS and then closed the skin using skin staples. Sterile dressings were applied. The patient tolerated the procedure well.       Johanny Ovalles MD      [id number]  D:  01/15/2020 7:51  T: 01/15/2020 17:01  JOB #:  [id number]

## 2020-01-15 NOTE — ROUTINE PROCESS
Bedside and Verbal shift change report given to [facility] (oncoming nurse) by James Londono RN   (offgoing nurse). Report included the following information SBAR, Kardex, Intake/Output and MAR.

## 2020-01-15 NOTE — PROGRESS NOTES
1530-Assumed care of patient at this time. Patient resting comfortably, SCDS in place, call bell within reach. Reapplied capnography. 1600 - Patient in bed at this time. A/O x 4. IV to right hand  intact and patent. ABD and tape  dressing to anterior abdomen CDI. Patient denies numbness/tingling. Pedal pulses palpable. Lungs clear but diminished. Bowel sounds active to all quadrants. Pain 6/10. Encouraged PCA. 1630-Patient ambulating in hallway using IV pole accompanied by this nurse for one lap, and then Hausen for second lap around the nurses station. Patient denies chest pain, SOB or dizziness. Shift Summary: Patient's pain marginally controlled with PCA pump, capnography applied. IV remains intact. Dressing to abdomen remains CDI.

## 2020-01-16 PROCEDURE — 74011250636 HC RX REV CODE- 250/636: Performed by: SPECIALIST

## 2020-01-16 PROCEDURE — C9113 INJ PANTOPRAZOLE SODIUM, VIA: HCPCS | Performed by: SPECIALIST

## 2020-01-16 PROCEDURE — 65270000029 HC RM PRIVATE

## 2020-01-16 PROCEDURE — 74011000250 HC RX REV CODE- 250: Performed by: SPECIALIST

## 2020-01-16 PROCEDURE — 74011250637 HC RX REV CODE- 250/637: Performed by: SPECIALIST

## 2020-01-16 RX ORDER — MAGNESIUM SULFATE HEPTAHYDRATE 40 MG/ML
2 INJECTION, SOLUTION INTRAVENOUS ONCE
Status: COMPLETED | OUTPATIENT
Start: 2020-01-16 | End: 2020-01-16

## 2020-01-16 RX ORDER — SIMETHICONE 80 MG
80 TABLET,CHEWABLE ORAL
Status: DISCONTINUED | OUTPATIENT
Start: 2020-01-16 | End: 2020-01-17 | Stop reason: HOSPADM

## 2020-01-16 RX ORDER — OXYCODONE AND ACETAMINOPHEN 5; 325 MG/1; MG/1
1 TABLET ORAL
Status: DISCONTINUED | OUTPATIENT
Start: 2020-01-16 | End: 2020-01-17 | Stop reason: HOSPADM

## 2020-01-16 RX ORDER — MAGNESIUM SULFATE HEPTAHYDRATE 40 MG/ML
4 INJECTION, SOLUTION INTRAVENOUS ONCE
Status: DISCONTINUED | OUTPATIENT
Start: 2020-01-16 | End: 2020-01-16 | Stop reason: CLARIF

## 2020-01-16 RX ORDER — POTASSIUM CHLORIDE 7.45 MG/ML
10 INJECTION INTRAVENOUS
Status: COMPLETED | OUTPATIENT
Start: 2020-01-16 | End: 2020-01-16

## 2020-01-16 RX ADMIN — SODIUM CHLORIDE 40 MG: 9 INJECTION, SOLUTION INTRAMUSCULAR; INTRAVENOUS; SUBCUTANEOUS at 09:15

## 2020-01-16 RX ADMIN — SIMETHICONE CHEW TAB 80 MG 80 MG: 80 TABLET ORAL at 14:42

## 2020-01-16 RX ADMIN — SODIUM CHLORIDE, SODIUM LACTATE, POTASSIUM CHLORIDE, AND CALCIUM CHLORIDE 150 ML/HR: 600; 310; 30; 20 INJECTION, SOLUTION INTRAVENOUS at 21:02

## 2020-01-16 RX ADMIN — OXYCODONE HYDROCHLORIDE AND ACETAMINOPHEN 1 TABLET: 5; 325 TABLET ORAL at 09:14

## 2020-01-16 RX ADMIN — SODIUM CHLORIDE, SODIUM LACTATE, POTASSIUM CHLORIDE, AND CALCIUM CHLORIDE 150 ML/HR: 600; 310; 30; 20 INJECTION, SOLUTION INTRAVENOUS at 09:24

## 2020-01-16 RX ADMIN — OXYCODONE HYDROCHLORIDE AND ACETAMINOPHEN 1 TABLET: 5; 325 TABLET ORAL at 15:36

## 2020-01-16 RX ADMIN — SIMETHICONE CHEW TAB 80 MG 80 MG: 80 TABLET ORAL at 21:02

## 2020-01-16 RX ADMIN — POTASSIUM CHLORIDE 10 MEQ: 10 INJECTION, SOLUTION INTRAVENOUS at 10:33

## 2020-01-16 RX ADMIN — OXYCODONE HYDROCHLORIDE AND ACETAMINOPHEN 1 TABLET: 5; 325 TABLET ORAL at 21:15

## 2020-01-16 RX ADMIN — MAGNESIUM SULFATE HEPTAHYDRATE 2 G: 40 INJECTION, SOLUTION INTRAVENOUS at 13:12

## 2020-01-16 RX ADMIN — POTASSIUM CHLORIDE 10 MEQ: 10 INJECTION, SOLUTION INTRAVENOUS at 09:22

## 2020-01-16 RX ADMIN — MAGNESIUM SULFATE HEPTAHYDRATE 2 G: 40 INJECTION, SOLUTION INTRAVENOUS at 14:54

## 2020-01-16 NOTE — PROGRESS NOTES
Problem: Falls - Risk of  Goal: *Absence of Falls  Description  Document Krystianally Heath Fall Risk and appropriate interventions in the flowsheet.   Outcome: Progressing Towards Goal  Note: Fall Risk Interventions:  Mobility Interventions: Patient to call before getting OOB         Medication Interventions: Patient to call before getting OOB, Teach patient to arise slowly    Elimination Interventions: Call light in reach, Patient to call for help with toileting needs

## 2020-01-16 NOTE — NURSE NAVIGATOR
Patient is tolerating her clear liquid diet. She is experiencing a lot of gassiness; Dr. Chadwick Holcomb ordered Simethicone. Abdomen continues to be tender. Patient using her IS and has completed up to 750 mL once. She splints her abdomen when completing it. She is ambulating in the hallways. Pain is managed.

## 2020-01-16 NOTE — PROGRESS NOTES
Progress Note  01/16/20    I saw Ms. King on rounds approximately 2-2:30 PM today and her day has been relatively uneventful. She has been ambulating very well up and down the [place]an[place], seems very motivated to recover, and she stated that she had the first signs of bowel function with a little bit of flatus midday. Her vital signs were totally stable, she is afebrile. Her urine output has been excellent. At the bedside today she appears well, her abdomen was soft and non distended. I have exposed her incision today, skin staples are all in place, there is no erythema noted. She does have very hypoactive bowel sounds present. Her extremities are all normal, there is no edema present either. Electrolytes did reveal she is low in her magnesium and potassium. We are going to replete that today, and hopefully her bowel function will return fully by tomorrow. We are going to give her some liquids today and a trial at a diet and hopefully she will possibly be ready for discharge sometime tomorrow late in the day. She is very comfortable with this plan of action and we will proceed as such.

## 2020-01-16 NOTE — ROUTINE PROCESS
Bedside and Verbal shift change report given to Sukhdev Hernandez RN (oncoming nurse) by RORY Sawant RN (offgoing nurse). Report included the following information SBAR, Kardex, Intake/Output, MAR and Recent Results.

## 2020-01-16 NOTE — PROGRESS NOTES
0740-Bedside and Verbal shift change report given to Haven Romero RN (oncoming nurse) by Rj Mariee RN (offgoing nurse). Report included the following information SBAR, Kardex and MAR.     1925-Verbal/bedside report provided to Tex Ji .  Report included SBAR, Kardex and MAR

## 2020-01-16 NOTE — PROGRESS NOTES
2146 -  Head to toe assessment performed at this time. Pt denies any chest pain or SOB. Pt denies any numbness or tingling to extremities. Pt encouraged to manage pain and to use the incentive spirometer. Pt educated on the side effects of medications taken. Pt left with call light within reach and bed in low position. Will continue to monitor. Shift summary - Pt pain managed with prn medication per MAR. Pt informed about all medications and side effects and encouraged to ask questions about medication. Pt encouraged throughout the night to use incentive spirometer and the purpose of the incentive spirometer. Pt left with no signs of distress and any concerns of pt addressed.

## 2020-01-16 NOTE — ROUTINE PROCESS
1930 - Bedside and Verbal shift change report given to Charlene Mccormack RN by Leeanne Mcleod RN. Report included the following information SBAR, Kardex, OR Summary, Intake/Output and MAR.

## 2020-01-17 ENCOUNTER — APPOINTMENT (OUTPATIENT)
Dept: GENERAL RADIOLOGY | Age: 40
DRG: 331 | End: 2020-01-17
Attending: SPECIALIST
Payer: OTHER GOVERNMENT

## 2020-01-17 VITALS
HEART RATE: 67 BPM | BODY MASS INDEX: 32.88 KG/M2 | RESPIRATION RATE: 18 BRPM | SYSTOLIC BLOOD PRESSURE: 119 MMHG | OXYGEN SATURATION: 100 % | TEMPERATURE: 98.4 F | DIASTOLIC BLOOD PRESSURE: 69 MMHG | WEIGHT: 157.3 LBS

## 2020-01-17 LAB
APPEARANCE UR: CLEAR
BILIRUB UR QL: NEGATIVE
COLOR UR: YELLOW
GLUCOSE UR STRIP.AUTO-MCNC: NEGATIVE MG/DL
HGB UR QL STRIP: NEGATIVE
KETONES UR QL STRIP.AUTO: >160 MG/DL
LEUKOCYTE ESTERASE UR QL STRIP.AUTO: NEGATIVE
NITRITE UR QL STRIP.AUTO: NEGATIVE
PH UR STRIP: 6 [PH] (ref 5–8)
PROT UR STRIP-MCNC: NEGATIVE MG/DL
SP GR UR REFRACTOMETRY: 1.01 (ref 1–1.03)
UROBILINOGEN UR QL STRIP.AUTO: 1 EU/DL (ref 0.2–1)

## 2020-01-17 PROCEDURE — 74011000250 HC RX REV CODE- 250: Performed by: SPECIALIST

## 2020-01-17 PROCEDURE — C9113 INJ PANTOPRAZOLE SODIUM, VIA: HCPCS | Performed by: SPECIALIST

## 2020-01-17 PROCEDURE — 74011250636 HC RX REV CODE- 250/636: Performed by: SPECIALIST

## 2020-01-17 PROCEDURE — 81003 URINALYSIS AUTO W/O SCOPE: CPT

## 2020-01-17 PROCEDURE — 71046 X-RAY EXAM CHEST 2 VIEWS: CPT

## 2020-01-17 PROCEDURE — 74011250637 HC RX REV CODE- 250/637: Performed by: SPECIALIST

## 2020-01-17 RX ORDER — OXYCODONE AND ACETAMINOPHEN 5; 325 MG/1; MG/1
1 TABLET ORAL
Qty: 40 TAB | Refills: 0 | Status: SHIPPED | OUTPATIENT
Start: 2020-01-17 | End: 2020-01-20

## 2020-01-17 RX ORDER — ACETAMINOPHEN 10 MG/ML
1000 INJECTION, SOLUTION INTRAVENOUS ONCE
Status: COMPLETED | OUTPATIENT
Start: 2020-01-17 | End: 2020-01-17

## 2020-01-17 RX ADMIN — SODIUM CHLORIDE 40 MG: 9 INJECTION, SOLUTION INTRAMUSCULAR; INTRAVENOUS; SUBCUTANEOUS at 08:46

## 2020-01-17 RX ADMIN — SODIUM CHLORIDE, SODIUM LACTATE, POTASSIUM CHLORIDE, AND CALCIUM CHLORIDE 150 ML/HR: 600; 310; 30; 20 INJECTION, SOLUTION INTRAVENOUS at 08:54

## 2020-01-17 RX ADMIN — OXYCODONE HYDROCHLORIDE AND ACETAMINOPHEN 1 TABLET: 5; 325 TABLET ORAL at 07:31

## 2020-01-17 RX ADMIN — OXYCODONE HYDROCHLORIDE AND ACETAMINOPHEN 1 TABLET: 5; 325 TABLET ORAL at 11:30

## 2020-01-17 RX ADMIN — ACETAMINOPHEN 1000 MG: 10 INJECTION, SOLUTION INTRAVENOUS at 08:46

## 2020-01-17 NOTE — DISCHARGE INSTRUCTIONS
Patient Education        Bowel Blockage (Intestinal Obstruction): Care Instructions  Your Care Instructions  A bowel blockage, also called an intestinal obstruction, can prevent gas, fluids, or solids from moving through the intestines normally. It can cause constipation and, rarely, diarrhea. You may have pain, nausea, vomiting, and cramping. Most of the time, complete blockages require a stay in the hospital and possibly surgery. But if your bowel is only partly blocked, your doctor may tell you to wait until it clears on its own and you are able to pass gas and stool. If so, there are things you can do at home to help make you feel better. If you have had surgery for a bowel blockage, there are things you can do at home to make sure you heal well. You can also make some changes to keep your bowel from becoming blocked again. Follow-up care is a key part of your treatment and safety. Be sure to make and go to all appointments, and call your doctor if you are having problems. It's also a good idea to know your test results and keep a list of the medicines you take. How can you care for yourself at home? If your doctor has told you to wait at home for a blockage to clear on its own:  · Follow your doctor's instructions. These may include eating a liquid diet to avoid complete blockage. · Be safe with medicines. Take your medicines exactly as prescribed. Call your doctor if you think you are having a problem with your medicine. · Put a heating pad set on low on your belly to relieve mild cramps and pain. To prevent another blockage  · Try to eat smaller amounts of food more often. For example, have 5 or 6 small meals throughout the day instead of 2 or 3 large meals. · Chew your food very well. Try to chew each bite about 20 times or until it is liquid. · Avoid high-fiber foods and raw fruits and vegetables with skins, husks, strings, or seeds.  These can form a ball of undigested material that can cause a blockage if a part of your bowel is scarred or narrowed. · Check with your doctor before you eat whole-grain products or use a fiber supplement such as Citrucel or Metamucil. · To help you have regular bowel movements, eat at regular times, do not strain during a bowel movement, and drink at least 8 to 10 glasses of water each day. If you have kidney, heart, or liver disease and have to limit fluids, talk with your doctor or before you increase the amount of fluids you drink. · Drink high-calorie liquid formulas if your doctor says to. Severe symptoms may make it hard for your body to take in vitamins and minerals. · Get regular exercise. It helps you digest your food better. Get at least 30 minutes of physical activity on most days of the week. Walking is a good choice. When should you call for help? Call your doctor now or seek immediate medical care if:    · You have a fever.     · You are vomiting.     · You have new or worse belly pain.     · You cannot pass stools or gas.    Watch closely for changes in your health, and be sure to contact your doctor if you have any problems. Where can you learn more? Go to http://jorge-prisca.info/. Enter G908 in the search box to learn more about \"Bowel Blockage (Intestinal Obstruction): Care Instructions. \"  Current as of: November 7, 2018  Content Version: 12.2  © 4766-4313 [employer], Incorporated. Care instructions adapted under license by [employer] (which disclaims liability or warranty for this information). If you have questions about a medical condition or this instruction, always ask your healthcare professional. [place] [address] any warranty or liability for your use of this information.

## 2020-01-17 NOTE — PROGRESS NOTES
Assumed care, Bedside report received from [id number] [address] 56.  Pt  Self am care at this time pain 10/10 PRN percocet given will recheck in 1 hr  0831: MD notified MEWS 3 due to elevated temp see new order

## 2020-01-17 NOTE — NURSE NAVIGATOR
This RN visited patient with Dr. Margaret Reynoso. Incision is without erythema and swelling. Small amount of serosanguinous drainage on dressing. Dressing changed by this RN. Dr. Margaret Reynoso educated patient and  on dressing changes upon discharge. Both verbalized understanding. Patient's fever reducing. Patient only able to complete 500-750 mL. RN educated her on the importance of using it 10x's per hour while awake. Patient verbalized understanding. Patient's lungs clear as per Dr. Margaret Reynoso.

## 2020-01-17 NOTE — ROUTINE PROCESS
Bedside and Verbal shift change report given to MARJORIE Gomes (oncoming nurse) by Eliana Evans RN   (offgoing nurse). Report included the following information SBAR, Kardex, Procedure Summary, Intake/Output, MAR and Recent Results.

## 2020-01-17 NOTE — PROGRESS NOTES
Ambulating in room, no distress noted    2100 Ambulated in hallway accompanied by . Mylicon given for gas pain    0130 Sleeping in bed, no distress noted. 0300 Ambulating in hallway by self.

## 2020-01-17 NOTE — PROGRESS NOTES
Bariatric Surgery                POD #3 (note reflects encounter Dr. Radha Covington with had the patient earlier this morning)    Visit Vitals  /77 (BP 1 Location: Right arm, BP Patient Position: At rest)   Pulse 90   Temp 99 °F (37.2 °C)   Resp 18   Wt 71.4 kg (157 lb 4.8 oz)   SpO2 100%   BMI 32.88 kg/m²     Patient has minimal complaints of pain, minimal nausea noted. She is anxious for D/C     Exam:  Appears well in no distress  Lungs- clear bilaterally  Abd - soft, incisions look good without erythema  Extremities- no new edema or swelling    Data Review:    Labs: Results:       Chemistry Recent Labs     01/15/20  0715   GLU 94      K 3.3*      CO2 28   BUN 5*   CREA 0.58*   CA 7.7*   AGAP 5   BUCR 9*      CBC w/Diff Recent Labs     01/15/20  0715   WBC 4.4*   RBC 3.46*   HGB 10.3*   HCT 31.3*         Coagulation No results for input(s): PTP, INR, APTT, INREXT in the last 72 hours. Liver Enzymes No results for input(s): TP, ALB, TBIL, AP, SGOT, GPT in the last 72 hours. No lab exists for component: DBIL       Assessment/Plan: S/P  laparotomy with resection of incarcerated iliocecal intussusception  - doing well without any issues. She is tolerating her diet without issue and her pain is controlled with PO pain meds. I have discussed her benign pathology once again this AM.  She is anxious for D/C.    1. D/C home with PO pain medication   2. Instructed in at home wound care  3.  Follow-up in the office in 10 days for staple removal

## 2020-01-18 NOTE — DISCHARGE SUMMARY
[address]    Name:  Vimal Ruvalcaba  MR#:   [id number]  :  1980  ACCOUNT #:  [de-identified]  ADMIT DATE:  2020  DISCHARGE DATE:  2020    ADMITTING PHYSICIAN:  Ligia Tovar. Leopold Schneiders, MD    ADMITTING DIAGNOSIS:  Abdominal pain. SECONDARY DIAGNOSES:  1. Abnormal CT. 2.  Suspected small bowel obstruction. 3.  Status post gastric bypass. 4.  Complex surgical history. 5.  Intestinal malabsorption. PROCEDURE PERFORMED:  1. Diagnostic laparoscopy. 2.  Open laparotomy, lysis of adhesions. 3.  Distal small bowel resection. 4.  Ileocecal resection with anastomosis. Above procedures performed by Dr. Nancy Escalona on 2020 with no noted complications. INDICATIONS:  The patient is a 78-year-old female patient, well-known to Dr. Nancy Escalona, who performed an uncomplicated gastric bypass procedure in 2018. She was doing well with her overall progress and weight loss. However, she did have several followup surgeries for adhesive disease as well as an internal hernia repair in 2019. Prior to this admission, the patient alerted Dr. Nancy Escalona that she was having abdominal pain. Given her complex history, he [email or web address] Unfortunately, she went to a [facility] in [place]. A full workup was performed, which did include a CT scan that showed dilated loops of small bowel in the mid to upper left side of the abdomen, which was suspicious for a small bowel obstruction. Given these findings, Dr. Leopold Schneiders was consulted. He felt the best course of action will be to correct this surgically. He took the patient in transfer. She arrived to the [facility] ER and was later transfer to the floor and a plan was made to perform the above-mentioned procedures on the morning of .     HOSPITAL COURSE:  The patient was admitted to the hospital under the care of Dr. Nancy Escalona on 01/14/2020 for surgical correction of a suspected small bowel obstruction. She was taken to the operating room. The diagnostic laparoscopy was performed. However, it was quickly evident that this issue would not be corrected laparoscopically. A laparotomy was performed, and an ileocecal intussusception was discovered. Dr. Cassidy President did a phone consultation with Dr. Maldonado Pan. They discussed the situation and felt the best course of action would be to perform the above-mentioned resection, which happened with no noted complications. The patient was transferred to the recovery room and later to the floor in stable condition. Her pain was controlled with a PCA pump. She was given IV fluids, strict n.p.o., as well as IV antibiotics. She progressed nicely over the course of her 3-day hospital stay. On the morning of postop day #1, the patient requested to have her PCA discontinued as she did not like the way it made her feel. She was switched to oral pain medications, which adequately controlled her pain. She was also allowed to start a clear liquid diet, which she tolerated well and was advanced to a full liquid diet by postop day 2. On the morning of postop day 3, she was seen by Dr. Flora Max. She expressed great desire to be discharged home. Dr. Cassidy President felt her overall clinical picture was adequate enough for her to be discharged home in stable condition with p.o. pain medications and instructions in advancement of her diet as well as instructions in wound care and strict lifting restrictions. She was instructed to the call office with any questions or concerns and return to the office in approximately 10 days for a follow up visit or sooner if need be.         Andreina Shelby PA-C MS      [id number]  D:  01/17/2020 7:01  T:  01/18/2020 1:01  JOB #:  [id number]

## 2020-01-19 RX ORDER — CHOLESTYRAMINE 4 G/9G
1 POWDER, FOR SUSPENSION ORAL
Qty: 60 PACKET | Refills: 1 | Status: SHIPPED | OUTPATIENT
Start: 2020-01-19 | End: 2020-01-21

## 2020-01-20 ENCOUNTER — TELEPHONE (OUTPATIENT)
Dept: SURGERY | Age: 40
End: 2020-01-20

## 2020-01-20 NOTE — TELEPHONE ENCOUNTER
This RN followed-up with patient post-discharge. She stated she is having some malodorous, yellowish drainage at her umbilical staple line that is needing to be cleaned multiple times throughout the day. RN recommended she cleanse the area with soap and water multiple times throughout the day, and she verbalized understanding. She stated her diarrhea is better after having eaten rice and oatmeal as per Dr. Annmarie Chakraborty. She has not picked up the prescription to date, as Rad is closed today due to the holiday. She will pick it up tomorrow. This RN consulted with Dr. Annmarie Chakraborty, who wanted patient seen tomorrow to assess umbilical stable line site (Patient was scheduled with BOD[place], [place], at 1040), as well as an appointment with him on Friday for staple removal (set up for 0930). Patient verbalized understanding to all.

## 2020-01-21 ENCOUNTER — OFFICE VISIT (OUTPATIENT)
Dept: SURGERY | Age: 40
End: 2020-01-21

## 2020-01-21 VITALS
WEIGHT: 139 LBS | HEIGHT: 58 IN | BODY MASS INDEX: 29.18 KG/M2 | HEART RATE: 64 BPM | OXYGEN SATURATION: 100 % | SYSTOLIC BLOOD PRESSURE: 108 MMHG | TEMPERATURE: 97.5 F | DIASTOLIC BLOOD PRESSURE: 63 MMHG

## 2020-01-21 DIAGNOSIS — Z87.19 HISTORY OF INTUSSUSCEPTION: ICD-10-CM

## 2020-01-21 DIAGNOSIS — T81.49XA SURGICAL WOUND INFECTION: ICD-10-CM

## 2020-01-21 DIAGNOSIS — T81.40XS POSTOPERATIVE INFECTION, UNSPECIFIED TYPE, SEQUELA: ICD-10-CM

## 2020-01-21 DIAGNOSIS — Z09 POSTOPERATIVE EXAMINATION: Primary | ICD-10-CM

## 2020-01-21 DIAGNOSIS — K56.1 INTUSSUSCEPTION (HCC): ICD-10-CM

## 2020-01-21 RX ORDER — OXYCODONE AND ACETAMINOPHEN 5; 325 MG/1; MG/1
TABLET ORAL
COMMUNITY
End: 2020-03-12

## 2020-01-21 RX ORDER — CEPHALEXIN 500 MG/1
500 CAPSULE ORAL 4 TIMES DAILY
Qty: 40 CAP | Refills: 0 | Status: SHIPPED | OUTPATIENT
Start: 2020-01-21 | End: 2020-01-31

## 2020-01-21 NOTE — PROGRESS NOTES
1. Have you been to the ER, urgent care clinic since your last visit? Hospitalized since your last visit? Yes    2. Have you seen or consulted any other health care providers outside of the [address] [place] [address] since your last visit? Include any pap smears or colon screening.  No        Chief Complaint   Patient presents with    Follow-up

## 2020-01-21 NOTE — PROGRESS NOTES
Subjective:      Osito Linder is a 38 y.o. female presents for postop care 6 days status post diagnostic laparoscopy changed to open diagnostic laparotomy with partial small bowel obstruction, small bowel and ascending colon resection and ileocecal anastomosis. Pain is well controlled. Appetite is good. Eating a soft food diet without difficulty. Bowel movements were diarrhea in nature until she started eating the bland soft food diet and now she has stopped having BM. She states she is going to start taking Miralax. She states she has been having purulent discharge from her surgical wound in the area of the umbilicus. She denies fever, chills, SOB or CP. Objective:     Visit Vitals  /63 (BP 1 Location: Left arm, BP Patient Position: Sitting)   Pulse 64   Temp 97.5 °F (36.4 °C)   Ht 4' 10\" (1.473 m)   Wt 63 kg (139 lb)   LMP 01/21/2020 (Exact Date)   SpO2 100%   BMI 29.05 kg/m²       General:  alert, cooperative, no distress, appears stated age   Abdomen: soft, bowel sounds active, appropriately tender   Incision:   erythema and induration of surrounding skin distal to the umbilicus and purulent discharge from umbilicus without odor, area is not hot to the touch. Otherwise healing well, no hernia, no seroma, no swelling, no dehiscence, incision well approximated       Pathology:  COLON AND ILEUM, RIGHT COLECTOMY:   INTUSSUSCEPTION OF SMALL BOWEL, WITH ISCHEMIC ILEITIS. NEGATIVE FOR NEOPLASM. MARGINS OF RESECTION NEGATIVE FOR NECROSIS. Assessment:     Doing well postoperatively. Possible start of incisional infection based on purulent discharge - start 10 day course of Keflex  Constipation - Miralax  Referral to Dr. Ozzy Gibbs for f/u care of intussusception per patient request.     Plan:     - Wound care discussed  - Pt is to increase activities as tolerated. - followup in 4 day(s) to see Dr. Leo Jameson or if patient has questions, concerns or worsening of condition.   If we are not available, patient is to go to the Emergency Department.

## 2020-01-21 NOTE — PATIENT INSTRUCTIONS
Patient Instructions 1. Remember hydration goals - minimum of 64 ounces of liquids per day (dehydration is the number one reason for hospital readmission). 2. Sleep 7-9 hours each night to keep your metabolism up. 3. Continue to monitor carbohydrate and protein intake you need a minimum of  Grams of protein daily- remember to keep your total carbohydrates to 50 grams or less per day for best results. 4. To maximize weight loss keep your caloric intake between 800-1,200 calories daily. If you are exercising excessively, such as training for a marathon, you need to keep a food log and meet with the dietician so they can advise you on your diet choices, carbohydrate intake and caloric intake. 5. Continue to work towards exercise goals - 60-90 minutes, 5 times a week minimum of deliberate, aerobic exercise is the ultimate goal with strength training 2 times each week. Refer to [employer] for  information. 6. Remember to take vitamins as directed in your handbook. 7. Attend support group the 2nd Thursday of each month. 8. Constipation: Milk of Magnesia is for immediate relief only. Miralax is to be used every day if constipation is a chronic problem. 9. Diarrhea: patients will occasionally develop lactose intolerance after surgery. Check to see if your protein shake has whey in it. If it does try a protein powder or drink that does not have whey and stop all yogurts, cheeses and milks to see if the diarrhea goes away. 10. If you have had labs drawn. We will only call you if you have abnormal results. Otherwise you can access the lab results in \"[employer]\". You will only need the access code the first time you sign on.    
11. Call us at [phone] or email us through [facility]" with questions,     concerns or worsening of condition, we have someone on call 24 hours a day. If you are unable to reach our office, you are to go to your Primary Care Physician or the Emergency Department. NOTE TO GASTRIC BYPASS PATIENTS:  (SAME APPLIES TO GASTRIC SLEEVE PATIENTS FOR FIRST TWO MONTHS) Remember that for the rest of your life, you are not able to take the following: 
- NSAIDs (ibuprofen, goody powder, BC powder, Motrin, Advil, Mobic, Voltaren, Excedrin, etc.) - Steroid pills or injections - Smoke (cigarettes or recreational drugs) - Alcohol Use of any of the above may cause ulcers in your stomach which may perforate causing a medical emergency and surgery. Speak to our medical staff if another medical provider requires you to take steroids or NSAIDs. Supplement Resource Guide Importance of Protein:  
Maintains lean body mass, produces antibodies to fight off infections, heals wounds, minimizes hair loss, helps to give you energy, helps with satiety, and keeping you full between meals. Importance of Calcium: 
Needed for healthy bones and teeth, normal blood clotting, and nervous system functioning, higher risk of osteoporosis and bone disease with non-compliance. Importance of Multivitamins: Many functions. Supply you with extra nutrients that you may be missing from food. May lead to iron deficiency anemia, weakness, fatigue, and many other symptoms with non-compliance. Importance of B Vitamins: 
Important for red blood cell formation, metabolism, energy, and helps to maintain a healthy nervous system. Protein Supplement Liquid diet phase: consume 90-100g protein daily. Once you are eating consume 35-50g protein each day from your protein supplement. 0-3 g fat per serving 0-3 g sugar per serving The body can only absorb 30g of protein at one time, so do not consume more than that at one time. Multi-vitamin Supplement:   
Start immediately after surgery: any complete chewable, such as: Grandys Complete chewables. Avoid Bay sours or gummies. They lack iron and other important nutrients and also have added sugar. Continue with a chewable vitamin or change to an adult complete multivitamin one month after surgery. Menstruating women can take a prenatal vitamin. Make sure it has at least 18 mg iron and 677-048 mcg folic acid Calcium Supplement:  
 
Start taking within one month after surgery. Look for:  
Calcium Citrate Plus D (1500 mg per day) Recommend: Citracal 
 
Avoid chocolate chewable calcium. Can use chewable bariatric or [employer] brand or similar chewable. The body cannot absorb more than 500-600 mg of calcium at one time. Take for Life Vitamin D Take 3,000 international units daily Vitamin B12 B Complex Vitamin Start taking both within one month after surgery. Vitamin B12 (sublingual): Take 1000 mcg of Vitamin B12 three times weekly Must take sublingually (meaning you put it under your tongue) or in a liquid drop form for easy absorption. B Complex Vitamin:  
Take one pill daily or liquid drop form daily; as directed on bottle. Take for Life

## 2020-01-22 ENCOUNTER — TELEPHONE (OUTPATIENT)
Dept: SURGERY | Age: 40
End: 2020-01-22

## 2020-01-22 NOTE — TELEPHONE ENCOUNTER
Per Dr Neil Neff he stated to call pt and have her come in today to see Alycia Gracia or Jessica Deluca about her incision leaking fluid. I called pt and she stated that she has an appt with her PCP that she did not want to miss that appt. She stated that she will have them look at it and if she needs to be seen by us she will call us to follow up tomorrow. I told her that I will forward this to Dr Neil Neff. Please see pervious phone message.

## 2020-01-22 NOTE — TELEPHONE ENCOUNTER
Pt called and stated that she woke up this morning and her night gown and towel was soaked with fluid and some blood on the site where her stiches were removed yesterday by Jessica Deluca. Pt wanted to know if this was normal. Pt stated that she is having lower back pain. She also stated that she has an appt today with her PCP at 2:30 pm.  Pt denies any other symptoms. I told her that I will forward this to Jessica Deluca.

## 2020-01-23 ENCOUNTER — TELEPHONE (OUTPATIENT)
Dept: SURGERY | Age: 40
End: 2020-01-23

## 2020-01-23 NOTE — TELEPHONE ENCOUNTER
Shanelle Hayes, Pharmacist from [facility], left a VM for this RN re: Angeles Yang order. Their pharmacy does not carry the brand name, and she was inquiring as to whether patient could have generic. This RN called back and spoke with Miryam Beavers, Pharmacist, and authorized the generic as per Dr. En Ibanez. She verbalized understanding.

## 2020-01-23 NOTE — TELEPHONE ENCOUNTER
This RN followed-up with patient re: PCP appointment yesterday and drainage to staple line. She stated the PCP said there is no infection at the site and encouraged her to continue taking her anti-biotic as ordered and finish treatment. Patient stated it drained excessively again last night, but this morning, has not drained as much. This RN educated patient on the importance of frequent dressing changes, and she verbalized understanding. She has an appointment with Dr. Russ Hardwick tomorrow morning at 0930.

## 2020-01-24 ENCOUNTER — OFFICE VISIT (OUTPATIENT)
Dept: SURGERY | Age: 40
End: 2020-01-24

## 2020-01-24 ENCOUNTER — HOSPITAL ENCOUNTER (OUTPATIENT)
Dept: LAB | Age: 40
Discharge: HOME | End: 2020-01-24
Payer: OTHER GOVERNMENT

## 2020-01-24 DIAGNOSIS — G89.18 POST-OP PAIN: Primary | ICD-10-CM

## 2020-01-24 LAB
25(OH)D3 SERPL-MCNC: 17.8 NG/ML (ref 30–100)
ALBUMIN SERPL-MCNC: 3.1 G/DL (ref 3.4–5)
ALBUMIN/GLOB SERPL: 0.9 {RATIO} (ref 0.8–1.7)
ALP SERPL-CCNC: 72 U/L (ref 45–117)
ALT SERPL-CCNC: 32 U/L (ref 13–56)
ANION GAP SERPL CALC-SCNC: 2 MMOL/L (ref 3–18)
AST SERPL-CCNC: 24 U/L (ref 10–38)
BASOPHILS # BLD: 0 K/UL (ref 0–0.1)
BASOPHILS NFR BLD: 0 % (ref 0–2)
BILIRUB SERPL-MCNC: 0.3 MG/DL (ref 0.2–1)
BUN SERPL-MCNC: 6 MG/DL (ref 7–18)
BUN/CREAT SERPL: 11 (ref 12–20)
CALCIUM SERPL-MCNC: 8.7 MG/DL (ref 8.5–10.1)
CHLORIDE SERPL-SCNC: 106 MMOL/L (ref 100–111)
CO2 SERPL-SCNC: 33 MMOL/L (ref 21–32)
CREAT SERPL-MCNC: 0.56 MG/DL (ref 0.6–1.3)
DIFFERENTIAL METHOD BLD: ABNORMAL
EOSINOPHIL # BLD: 0.2 K/UL (ref 0–0.4)
EOSINOPHIL NFR BLD: 3 % (ref 0–5)
ERYTHROCYTE [DISTWIDTH] IN BLOOD BY AUTOMATED COUNT: 12.8 % (ref 11.6–14.5)
FERRITIN SERPL-MCNC: 174 NG/ML (ref 8–388)
FOLATE SERPL-MCNC: 15.7 NG/ML (ref 3.1–17.5)
GLOBULIN SER CALC-MCNC: 3.6 G/DL (ref 2–4)
GLUCOSE SERPL-MCNC: 87 MG/DL (ref 74–99)
HCT VFR BLD AUTO: 31.6 % (ref 35–45)
HGB BLD-MCNC: 9.9 G/DL (ref 12–16)
IRON SERPL-MCNC: 49 UG/DL (ref 50–175)
LYMPHOCYTES # BLD: 1.2 K/UL (ref 0.9–3.6)
LYMPHOCYTES NFR BLD: 25 % (ref 21–52)
MCH RBC QN AUTO: 29 PG (ref 24–34)
MCHC RBC AUTO-ENTMCNC: 31.3 G/DL (ref 31–37)
MCV RBC AUTO: 92.7 FL (ref 74–97)
MONOCYTES # BLD: 0.3 K/UL (ref 0.05–1.2)
MONOCYTES NFR BLD: 5 % (ref 3–10)
NEUTS SEG # BLD: 3.3 K/UL (ref 1.8–8)
NEUTS SEG NFR BLD: 67 % (ref 40–73)
PLATELET # BLD AUTO: 351 K/UL (ref 135–420)
PMV BLD AUTO: 9 FL (ref 9.2–11.8)
POTASSIUM SERPL-SCNC: 3.8 MMOL/L (ref 3.5–5.5)
PROT SERPL-MCNC: 6.7 G/DL (ref 6.4–8.2)
RBC # BLD AUTO: 3.41 M/UL (ref 4.2–5.3)
SODIUM SERPL-SCNC: 141 MMOL/L (ref 136–145)
VIT B12 SERPL-MCNC: >2000 PG/ML (ref 211–911)
WBC # BLD AUTO: 4.9 K/UL (ref 4.6–13.2)

## 2020-01-24 PROCEDURE — 82306 VITAMIN D 25 HYDROXY: CPT

## 2020-01-24 PROCEDURE — 80053 COMPREHEN METABOLIC PANEL: CPT

## 2020-01-24 PROCEDURE — 36415 COLL VENOUS BLD VENIPUNCTURE: CPT

## 2020-01-24 PROCEDURE — 85025 COMPLETE CBC W/AUTO DIFF WBC: CPT

## 2020-01-24 PROCEDURE — 84425 ASSAY OF VITAMIN B-1: CPT

## 2020-01-24 PROCEDURE — 82728 ASSAY OF FERRITIN: CPT

## 2020-01-24 PROCEDURE — 83540 ASSAY OF IRON: CPT

## 2020-01-24 PROCEDURE — 82607 VITAMIN B-12: CPT

## 2020-01-24 NOTE — PROGRESS NOTES
Ry Fontanez presents today for   Chief Complaint   Patient presents with    Follow-up     Pt is here today for her follow up. Pt stated that she is having fluid leak from her incision site. Is someone accompanying this pt? yes    Is the patient using any DME equipment during [address]? no    Depression Screening:  3 most recent PHQ Screens 1/21/2020   Little interest or pleasure in doing things Not at all   Feeling down, depressed, irritable, or hopeless Not at all   Total Score PHQ 2 0       Learning Assessment:  Learning Assessment 4/24/2019   PRIMARY LEARNER Patient   HIGHEST LEVEL OF EDUCATION - PRIMARY LEARNER  -   BARRIERS PRIMARY LEARNER -   [email or web address]    NEED -   LEARNER PREFERENCE PRIMARY DEMONSTRATION   LEARNING SPECIAL TOPICS -   ANSWERED BY patient   RELATIONSHIP SELF       Abuse Screening:  Abuse Screening Questionnaire 10/30/2019   Do you ever feel afraid of your partner? N   Are you in a relationship with someone who physically or mentally threatens you? N   Is it safe for you to go home? Y       Fall Risk  No flowsheet data found. Coordination of Care:  1. Have you been to the ER, urgent care clinic since your last visit? Hospitalized since your last visit? no    2. Have you seen or consulted any other health care providers outside of the [address] [place] [address] since your last visit? Include any pap smears or colon screening.  no

## 2020-01-24 NOTE — PATIENT INSTRUCTIONS
Body Mass Index: Care Instructions  Your Care Instructions    Body mass index (BMI) can help you see if your weight is raising your risk for health problems. It uses a formula to compare how much you weigh with how tall you are. · A BMI lower than 18.5 is considered underweight. · A BMI between 18.5 and 24.9 is considered healthy. · A BMI between 25 and 29.9 is considered overweight. A BMI of 30 or higher is considered obese. If your BMI is in the normal range, it means that you have a lower risk for weight-related health problems. If your BMI is in the overweight or obese range, you may be at increased risk for weight-related health problems, such as high blood pressure, heart disease, stroke, arthritis or joint pain, and diabetes. If your BMI is in the underweight range, you may be at increased risk for health problems such as fatigue, lower protection (immunity) against illness, muscle loss, bone loss, hair loss, and hormone problems. BMI is just one measure of your risk for weight-related health problems. You may be at higher risk for health problems if you are not active, you eat an unhealthy diet, or you drink too much alcohol or use tobacco products. Follow-up care is a key part of your treatment and safety. Be sure to make and go to all appointments, and call your doctor if you are having problems. It's also a good idea to know your test results and keep a list of the medicines you take. How can you care for yourself at home? · Practice healthy eating habits. This includes eating plenty of fruits, vegetables, whole grains, lean protein, and low-fat dairy. · If your doctor recommends it, get more exercise. Walking is a good choice. Bit by bit, increase the amount you walk every day. Try for at least 30 minutes on most days of the week. · Do not smoke. Smoking can increase your risk for health problems. If you need help quitting, talk to your doctor about stop-smoking programs and medicines. These can increase your chances of quitting for good. · Limit alcohol to 2 drinks a day for men and 1 drink a day for women. Too much alcohol can cause health problems. If you have a BMI higher than 25  · Your doctor may do other tests to check your risk for weight-related health problems. This may include measuring the distance around your waist. A waist measurement of more than 40 inches in men or 35 inches in women can increase the risk of weight-related health problems. · Talk with your doctor about steps you can take to stay healthy or improve your health. You may need to make lifestyle changes to lose weight and stay healthy, such as changing your diet and getting regular exercise. If you have a BMI lower than 18.5  · Your doctor may do other tests to check your risk for health problems. · Talk with your doctor about steps you can take to stay healthy or improve your health. You may need to make lifestyle changes to gain or maintain weight and stay healthy, such as getting more healthy foods in your diet and doing exercises to build muscle. Where can you learn more? Go to http://jorge-prisca.info/. Enter S176 in the search box to learn more about \"Body Mass Index: Care Instructions. \"  Current as of: March 28, 2019  Content Version: 12.2  © 8272-4184 [employer]. Care instructions adapted under license by [employer] (which disclaims liability or warranty for this information). If you have questions about a medical condition or this instruction, always ask your healthcare professional. [address] any warranty or liability for your use of this information.

## 2020-01-27 ENCOUNTER — OFFICE VISIT (OUTPATIENT)
Dept: SURGERY | Age: 40
End: 2020-01-27

## 2020-01-27 VITALS
OXYGEN SATURATION: 99 % | HEIGHT: 58 IN | WEIGHT: 131.13 LBS | HEART RATE: 63 BPM | SYSTOLIC BLOOD PRESSURE: 110 MMHG | RESPIRATION RATE: 16 BRPM | TEMPERATURE: 97.6 F | BODY MASS INDEX: 27.53 KG/M2 | DIASTOLIC BLOOD PRESSURE: 63 MMHG

## 2020-01-27 VITALS
TEMPERATURE: 97.6 F | WEIGHT: 130.5 LBS | OXYGEN SATURATION: 100 % | BODY MASS INDEX: 27.39 KG/M2 | HEART RATE: 91 BPM | DIASTOLIC BLOOD PRESSURE: 69 MMHG | HEIGHT: 58 IN | SYSTOLIC BLOOD PRESSURE: 104 MMHG

## 2020-01-27 DIAGNOSIS — Z51.89 VISIT FOR WOUND CHECK: Primary | ICD-10-CM

## 2020-01-27 LAB — VIT B1 BLD-SCNC: 81.1 NMOL/L (ref 66.5–200)

## 2020-01-27 RX ORDER — ERGOCALCIFEROL 1.25 MG/1
CAPSULE ORAL
Qty: 52 CAP | Refills: 1 | Status: SHIPPED | OUTPATIENT
Start: 2020-01-27

## 2020-01-27 NOTE — PATIENT INSTRUCTIONS
Body Mass Index: Care Instructions Your Care Instructions Body mass index (BMI) can help you see if your weight is raising your risk for health problems. It uses a formula to compare how much you weigh with how tall you are. · A BMI lower than 18.5 is considered underweight. · A BMI between 18.5 and 24.9 is considered healthy. · A BMI between 25 and 29.9 is considered overweight. A BMI of 30 or higher is considered obese. If your BMI is in the normal range, it means that you have a lower risk for weight-related health problems. If your BMI is in the overweight or obese range, you may be at increased risk for weight-related health problems, such as high blood pressure, heart disease, stroke, arthritis or joint pain, and diabetes. If your BMI is in the underweight range, you may be at increased risk for health problems such as fatigue, lower protection (immunity) against illness, muscle loss, bone loss, hair loss, and hormone problems. BMI is just one measure of your risk for weight-related health problems. You may be at higher risk for health problems if you are not active, you eat an unhealthy diet, or you drink too much alcohol or use tobacco products. Follow-up care is a key part of your treatment and safety. Be sure to make and go to all appointments, and call your doctor if you are having problems. It's also a good idea to know your test results and keep a list of the medicines you take. How can you care for yourself at home? · Practice healthy eating habits. This includes eating plenty of fruits, vegetables, whole grains, lean protein, and low-fat dairy. · If your doctor recommends it, get more exercise. Walking is a good choice. Bit by bit, increase the amount you walk every day. Try for at least 30 minutes on most days of the week. · Do not smoke. Smoking can increase your risk for health problems.  If you need help quitting, talk to your doctor about stop-smoking programs and medicines. These can increase your chances of quitting for good. · Limit alcohol to 2 drinks a day for men and 1 drink a day for women. Too much alcohol can cause health problems. If you have a BMI higher than 25 · Your doctor may do other tests to check your risk for weight-related health problems. This may include measuring the distance around your waist. A waist measurement of more than 40 inches in men or 35 inches in women can increase the risk of weight-related health problems. · Talk with your doctor about steps you can take to stay healthy or improve your health. You may need to make lifestyle changes to lose weight and stay healthy, such as changing your diet and getting regular exercise. If you have a BMI lower than 18.5 · Your doctor may do other tests to check your risk for health problems. · Talk with your doctor about steps you can take to stay healthy or improve your health. You may need to make lifestyle changes to gain or maintain weight and stay healthy, such as getting more healthy foods in your diet and doing exercises to build muscle. Where can you learn more? Go to http://jorge-prisca.info/. Enter S176 in the search box to learn more about \"Body Mass Index: Care Instructions. \" Current as of: March 28, 2019 Content Version: 12.2 © 0054-6797 [employer]. Care instructions adapted under license by [employer] (which disclaims liability or warranty for this information). If you have questions about a medical condition or this instruction, always ask your healthcare professional. [address] any warranty or liability for your use of this information.

## 2020-01-27 NOTE — PROGRESS NOTES
Amol Hodgson presents today for   Chief Complaint   Patient presents with    Follow-up     Pt is here today for her follow up       Is someone accompanying this pt? no    Is the patient using any DME equipment during [facility]? no    Depression Screening:  3 most recent PHQ Screens 1/27/2020   Little interest or pleasure in doing things Not at all   Feeling down, depressed, irritable, or hopeless Not at all   Total Score PHQ 2 0       Learning Assessment:  Learning Assessment 1/27/2020   PRIMARY LEARNER Patient   HIGHEST LEVEL OF EDUCATION - PRIMARY LEARNER  GRADUATED HIGH SCHOOL OR GED   BARRIERS PRIMARY LEARNER NONE   CO-LEARNER CAREGIVER No   BARRIERS CO-LEARNER -   PRIMARY LANGUAGE ENGLISH    NEED No   LEARNER PREFERENCE PRIMARY DEMONSTRATION   LEARNING SPECIAL TOPICS no   ANSWERED BY patient   RELATIONSHIP SELF       Abuse Screening:  Abuse Screening Questionnaire 1/27/2020   Do you ever feel afraid of your partner? N   Are you in a relationship with someone who physically or mentally threatens you? N   Is it safe for you to go home? Y       Fall Risk  No flowsheet data found. Coordination of Care:  1. Have you been to the ER, urgent care clinic since your last visit? Hospitalized since your last visit? no    2. Have you seen or consulted any other health care providers outside of the [address] [place] [address] since your last visit? Include any pap smears or colon screening.  no

## 2020-01-27 NOTE — PROGRESS NOTES
Spoke with pt she is aware of results and what Dr David Candelaria stated: Call in Vitamin D 50,000 units twice weekly for 1 year    Pt stated that she is not pregnant, and Rx for Vit D 50,000 units sent to pharm.

## 2020-01-27 NOTE — PROGRESS NOTES
Subjective:     Bandar Andujar  is a 38 y.o. female who presents for follow-up about 10 days following laparotomy for resection of ileocecal intussusception . Pain assessment - 5/10    Surgery related complication: NA    Surgical Pathology/Bx have been discussed       She reports an expected amount of discomfort as well as drainage from her incision and denies vomiting and difficulty breathing. The patient's exercise level: not active. Changes in her medical history and medications have been reviewed. Patient Active Problem List   Diagnosis Code    Constipation K59.00    History of colon polyps Z86.010    Intestinal malabsorption K90.9    S/P gastric bypass Z98.84    Hypokalemia E87.6    Hypomagnesemia E83.42    Iron deficiency E61.1    Pica F50.89    Intertriginous candidiasis B37.2    Internal hernia K45.8    Small bowel obstruction ([address] K56.609    History of intussusception Z87.19     Past Medical History:   Diagnosis Date    Constipation     Functional dyspepsia     uses OTC meds    GERD (gastroesophageal reflux disease)     History of colon polyps     History of intussusception 2020    1/15/2020 Diagnostic laparoscopy changed to open laparotomy by Dr. Cassidy President Distal small bowel resection with Ileocecal resection with anastomosis.     Morbid obesity ([address]     Morbid obesity with body mass index (BMI) of 40.0 to 49.9 (HCC)     Nausea & vomiting 2018    SBO (small bowel obstruction) (HCC)      Past Surgical History:   Procedure Laterality Date    ABDOMEN SURGERY PROC UNLISTED      adhensions on liver    COLONOSCOPY      X 3    DELIVERY       X 3    HX  SECTION      HX COLECTOMY      HX DILATION AND CURETTAGE      HX GASTRIC BYPASS      HX GI      gastric bypass    HX OTHER SURGICAL      scar resection back of head    HX TUBAL LIGATION      HX UROLOGICAL  2001    stent    IR URETERAL STENT PLACEMENT         Objective:     Visit Vitals  /63 (BP 1 Location: Left arm, BP Patient Position: Sitting)   Pulse 63   Temp 97.6 °F (36.4 °C)   Resp 16   Ht 4' 10\" (1.473 m)   Wt 59.5 kg (131 lb 2 oz)   LMP 01/21/2020 (Exact Date)   SpO2 99%   BMI 27.41 kg/m²        Physical Exam:    General:  alert, cooperative, no distress, appears stated age   Pulm: clear to auscultation bilaterally   Heart:  Regular rate and rhythm   Abdomen:   abdomen is soft without significant tenderness, masses, organomegaly or guarding; Incisions: healing well       Assessment:     1. History of Morbid obesity, status post  laparotomy . I have had a long discussion with the patient and her  about a variety of her issues as well as her benign pathology. Everything she is experiencing is expected 10 days post laparotomy. Plan:     1.  Return in 72 hours for staple removal

## 2020-02-01 ENCOUNTER — APPOINTMENT (OUTPATIENT)
Dept: CT IMAGING | Age: 40
End: 2020-02-01
Attending: PHYSICIAN ASSISTANT
Payer: OTHER GOVERNMENT

## 2020-02-01 ENCOUNTER — HOSPITAL ENCOUNTER (EMERGENCY)
Age: 40
Discharge: HOME | End: 2020-02-01
Attending: EMERGENCY MEDICINE
Payer: OTHER GOVERNMENT

## 2020-02-01 VITALS
SYSTOLIC BLOOD PRESSURE: 102 MMHG | HEART RATE: 66 BPM | HEIGHT: 58 IN | TEMPERATURE: 98.8 F | BODY MASS INDEX: 27.71 KG/M2 | OXYGEN SATURATION: 100 % | DIASTOLIC BLOOD PRESSURE: 78 MMHG | WEIGHT: 132 LBS | RESPIRATION RATE: 16 BRPM

## 2020-02-01 DIAGNOSIS — R42 DIZZINESS: ICD-10-CM

## 2020-02-01 DIAGNOSIS — R51.9 NONINTRACTABLE HEADACHE, UNSPECIFIED CHRONICITY PATTERN, UNSPECIFIED HEADACHE TYPE: Primary | ICD-10-CM

## 2020-02-01 DIAGNOSIS — R19.7 DIARRHEA, UNSPECIFIED TYPE: ICD-10-CM

## 2020-02-01 LAB
ALBUMIN SERPL-MCNC: 3.3 G/DL (ref 3.4–5)
ALBUMIN/GLOB SERPL: 0.9 {RATIO} (ref 0.8–1.7)
ALP SERPL-CCNC: 86 U/L (ref 45–117)
ALT SERPL-CCNC: 22 U/L (ref 13–56)
ANION GAP SERPL CALC-SCNC: 2 MMOL/L (ref 3–18)
APPEARANCE UR: CLEAR
AST SERPL-CCNC: 25 U/L (ref 10–38)
BASOPHILS # BLD: 0 K/UL (ref 0–0.1)
BASOPHILS NFR BLD: 0 % (ref 0–2)
BILIRUB SERPL-MCNC: 0.3 MG/DL (ref 0.2–1)
BILIRUB UR QL: NEGATIVE
BUN SERPL-MCNC: 9 MG/DL (ref 7–18)
BUN/CREAT SERPL: 17 (ref 12–20)
CALCIUM SERPL-MCNC: 8.5 MG/DL (ref 8.5–10.1)
CHLORIDE SERPL-SCNC: 109 MMOL/L (ref 100–111)
CK MB CFR SERPL CALC: NORMAL % (ref 0–4)
CK MB SERPL-MCNC: <1 NG/ML (ref 5–25)
CK SERPL-CCNC: 74 U/L (ref 26–192)
CO2 SERPL-SCNC: 30 MMOL/L (ref 21–32)
COLOR UR: YELLOW
CREAT SERPL-MCNC: 0.52 MG/DL (ref 0.6–1.3)
DIFFERENTIAL METHOD BLD: ABNORMAL
EOSINOPHIL # BLD: 0.1 K/UL (ref 0–0.4)
EOSINOPHIL NFR BLD: 3 % (ref 0–5)
ERYTHROCYTE [DISTWIDTH] IN BLOOD BY AUTOMATED COUNT: 13.4 % (ref 11.6–14.5)
GLOBULIN SER CALC-MCNC: 3.7 G/DL (ref 2–4)
GLUCOSE SERPL-MCNC: 87 MG/DL (ref 74–99)
GLUCOSE UR STRIP.AUTO-MCNC: NEGATIVE MG/DL
HCG UR QL: NEGATIVE
HCT VFR BLD AUTO: 32.7 % (ref 35–45)
HGB BLD-MCNC: 10.2 G/DL (ref 12–16)
HGB UR QL STRIP: NEGATIVE
KETONES UR QL STRIP.AUTO: NEGATIVE MG/DL
LEUKOCYTE ESTERASE UR QL STRIP.AUTO: NEGATIVE
LIPASE SERPL-CCNC: 268 U/L (ref 73–393)
LYMPHOCYTES # BLD: 1.2 K/UL (ref 0.9–3.6)
LYMPHOCYTES NFR BLD: 24 % (ref 21–52)
MAGNESIUM SERPL-MCNC: 2.1 MG/DL (ref 1.6–2.6)
MCH RBC QN AUTO: 29.1 PG (ref 24–34)
MCHC RBC AUTO-ENTMCNC: 31.2 G/DL (ref 31–37)
MCV RBC AUTO: 93.4 FL (ref 74–97)
MONOCYTES # BLD: 0.3 K/UL (ref 0.05–1.2)
MONOCYTES NFR BLD: 6 % (ref 3–10)
NEUTS SEG # BLD: 3.4 K/UL (ref 1.8–8)
NEUTS SEG NFR BLD: 67 % (ref 40–73)
NITRITE UR QL STRIP.AUTO: NEGATIVE
PH UR STRIP: 6 [PH] (ref 5–8)
PLATELET # BLD AUTO: 261 K/UL (ref 135–420)
PMV BLD AUTO: 9.2 FL (ref 9.2–11.8)
POTASSIUM SERPL-SCNC: 4.2 MMOL/L (ref 3.5–5.5)
PROT SERPL-MCNC: 7 G/DL (ref 6.4–8.2)
PROT UR STRIP-MCNC: NEGATIVE MG/DL
RBC # BLD AUTO: 3.5 M/UL (ref 4.2–5.3)
SODIUM SERPL-SCNC: 141 MMOL/L (ref 136–145)
SP GR UR REFRACTOMETRY: 1.02 (ref 1–1.03)
TROPONIN I SERPL-MCNC: <0.02 NG/ML (ref 0–0.04)
UROBILINOGEN UR QL STRIP.AUTO: 0.2 EU/DL (ref 0.2–1)
WBC # BLD AUTO: 5.1 K/UL (ref 4.6–13.2)

## 2020-02-01 PROCEDURE — 80053 COMPREHEN METABOLIC PANEL: CPT

## 2020-02-01 PROCEDURE — 74011250636 HC RX REV CODE- 250/636: Performed by: PHYSICIAN ASSISTANT

## 2020-02-01 PROCEDURE — 81025 URINE PREGNANCY TEST: CPT

## 2020-02-01 PROCEDURE — 99284 EMERGENCY DEPT VISIT MOD MDM: CPT

## 2020-02-01 PROCEDURE — 83735 ASSAY OF MAGNESIUM: CPT

## 2020-02-01 PROCEDURE — 83690 ASSAY OF LIPASE: CPT

## 2020-02-01 PROCEDURE — 81003 URINALYSIS AUTO W/O SCOPE: CPT

## 2020-02-01 PROCEDURE — 85025 COMPLETE CBC W/AUTO DIFF WBC: CPT

## 2020-02-01 PROCEDURE — 96361 HYDRATE IV INFUSION ADD-ON: CPT

## 2020-02-01 PROCEDURE — 70450 CT HEAD/BRAIN W/O DYE: CPT

## 2020-02-01 PROCEDURE — 82550 ASSAY OF CK (CPK): CPT

## 2020-02-01 PROCEDURE — 96374 THER/PROPH/DIAG INJ IV PUSH: CPT

## 2020-02-01 RX ORDER — PROCHLORPERAZINE EDISYLATE 5 MG/ML
10 INJECTION INTRAMUSCULAR; INTRAVENOUS
Status: COMPLETED | OUTPATIENT
Start: 2020-02-01 | End: 2020-02-01

## 2020-02-01 RX ORDER — ONDANSETRON 4 MG/1
4 TABLET, ORALLY DISINTEGRATING ORAL
Qty: 20 TAB | Refills: 0 | Status: SHIPPED | OUTPATIENT
Start: 2020-02-01 | End: 2020-03-12

## 2020-02-01 RX ADMIN — SODIUM CHLORIDE 1000 ML: 900 INJECTION, SOLUTION INTRAVENOUS at 18:39

## 2020-02-01 RX ADMIN — PROCHLORPERAZINE EDISYLATE 10 MG: 5 INJECTION INTRAMUSCULAR; INTRAVENOUS at 18:40

## 2020-02-01 NOTE — ED PROVIDER NOTES
EMERGENCY DEPARTMENT HISTORY AND PHYSICAL EXAM    Date: 2/1/2020  Patient Name: Osito Linder    History of Presenting Illness     Chief Complaint   Patient presents with    Post OP Complication    Headache    Dizziness         History Provided By: Patient    Osito Linder is a 38 y.o. female with PMHX of gastric bypass 2 years ago and intussusception with surgery 2 weeks ago who presents to the emergency department C/O headache. Associated sxs include dizziness, diarrhea. Patient states that she was seen in this facility for recurrence of a small bowel obstruction patient was found to have intussusception and underwent surgery by Dr. Kailee French. Patient states when she discharged in the hospital she is since. Patient expresses concern for possible dehydration causing her to have headache and dizziness. Patient reports 3-day history of generalized headache with some intermittent dizziness along with 2 weeks of intermittent loose stools following surgery. Pt denies fever, bloody or black stools, vomiting, known sick contacts and numbness or weakness, and any other sxs or complaints. PCP: Other, MD [place]    Current Facility-Administered Medications   Medication Dose Route Frequency Provider Last Rate Last Dose    sodium chloride 0.9 % bolus infusion 1,000 mL  1,000 mL IntraVENous ONCE Alberto Mitchell PA 1,000 mL/hr at 02/01/20 1839 1,000 mL at 02/01/20 1839     Current Outpatient Medications   Medication Sig Dispense Refill    ondansetron (ZOFRAN ODT) 4 mg disintegrating tablet Take 1 Tab by mouth every eight (8) hours as needed for Nausea or Vomiting. 20 Tab 0    ergocalciferol (ERGOCALCIFEROL) 1,250 mcg (50,000 unit) capsule Take 1 cap by mouth twice weekly for 12 months 52 Cap 1    oxyCODONE-acetaminophen (PERCOCET) 5-325 mg per tablet Take  by mouth every four (4) hours as needed for Pain.  cyanocobalamin (VITAMIN B-12) 1,000 mcg sublingual tablet Take 1,000 mcg by mouth daily.       multivit-minerals/folic acid (ADULT MULTIVITAMIN GUMMIES PO) Take  by mouth.  calcium citrate 200 mg (950 mg) tablet Take 200 mg by mouth two (2) times a day. Past History     Past Medical History:  Past Medical History:   Diagnosis Date    Constipation     Functional dyspepsia     uses OTC meds    GERD (gastroesophageal reflux disease)     History of colon polyps     History of intussusception 2020    1/15/2020 Diagnostic laparoscopy changed to open laparotomy by Dr. Cassidy President Distal small bowel resection with Ileocecal resection with anastomosis.  Morbid obesity ([address]     Morbid obesity with body mass index (BMI) of 40.0 to 49.9 (HCC)     Nausea & vomiting 2018    SBO (small bowel obstruction) ([facility])        Past Surgical History:  Past Surgical History:   Procedure Laterality Date    ABDOMEN SURGERY PROC UNLISTED      adhensions on liver    COLONOSCOPY      X 3    DELIVERY       X 3    HX  SECTION      HX COLECTOMY      HX DILATION AND CURETTAGE      HX GASTRIC BYPASS      HX GI      gastric bypass    HX OTHER SURGICAL      scar resection back of head    HX TUBAL LIGATION      HX UROLOGICAL  2001    stent    IR URETERAL STENT PLACEMENT         Family History:  Family History   Problem Relation Age of Onset   [facility] Hypertension Mother     Obesity Mother     Hypertension Father        Social History:  Social History     Tobacco Use    Smoking status: Never Smoker    Smokeless tobacco: Never Used   Substance Use Topics    Alcohol use: Yes     Comment: rare    Drug use: No       Allergies: Allergies   Allergen Reactions    Niacin Other (comments)    Morphine Hives and Itching         Review of Systems   Review of Systems   Constitutional: Negative for fever. HENT: Negative for congestion. Respiratory: Negative for cough and shortness of breath. Cardiovascular: Negative for chest pain. Gastrointestinal: Positive for diarrhea and nausea. Negative for abdominal pain, blood in stool and vomiting. Musculoskeletal: Negative for myalgias. Neurological: Positive for dizziness and headaches. Negative for syncope, weakness and numbness. All other systems reviewed and are negative. Physical Exam     Vitals:    02/01/20 1741   BP: 102/78   Pulse: 66   Resp: 16   Temp: 98.8 °F (37.1 °C)   SpO2: 100%   Weight: 59.9 kg (132 lb)   Height: 4' 10\" (1.473 m)     Physical Exam  Vitals signs and nursing note reviewed. Constitutional:       General: She is not in acute distress. Appearance: Normal appearance. She is not ill-appearing. HENT:      Head: Normocephalic and atraumatic. Nose: Nose normal.      Mouth/Throat:      Mouth: Mucous membranes are moist.   Eyes:      Extraocular Movements: Extraocular movements intact. Conjunctiva/sclera: Conjunctivae normal.      Pupils: Pupils are equal, round, and reactive to light. Neck:      Musculoskeletal: Normal range of motion and neck supple. Cardiovascular:      Rate and Rhythm: Normal rate and regular rhythm. Pulmonary:      Effort: Pulmonary effort is normal.      Breath sounds: Normal breath sounds. Abdominal:      Palpations: Abdomen is soft. Tenderness: There is no abdominal tenderness. Musculoskeletal: Normal range of motion. Skin:     General: Skin is warm and dry. Capillary Refill: Capillary refill takes less than 2 seconds. Neurological:      General: No focal deficit present. Mental Status: She is alert and oriented to person, place, and time. Cranial Nerves: No cranial nerve deficit.       Coordination: Coordination normal.      Gait: Gait normal.   Psychiatric:         Mood and Affect: Mood normal.         Behavior: Behavior normal.         Diagnostic Study Results     Labs -     Recent Results (from the past 12 hour(s))   CBC WITH AUTOMATED DIFF    Collection Time: 02/01/20  6:30 PM   Result Value Ref Range    WBC 5.1 4.6 - 13.2 K/uL    RBC 3.50 (L) 4.20 - 5.30 M/uL    HGB 10.2 (L) 12.0 - 16.0 g/dL    HCT 32.7 (L) 35.0 - 45.0 %    MCV 93.4 74.0 - 97.0 FL    MCH 29.1 24.0 - 34.0 PG    MCHC 31.2 31.0 - 37.0 g/dL    RDW 13.4 11.6 - 14.5 %    PLATELET 244 270 - 273 K/uL    MPV 9.2 9.2 - 11.8 FL    NEUTROPHILS 67 40 - 73 %    LYMPHOCYTES 24 21 - 52 %    MONOCYTES 6 3 - 10 %    EOSINOPHILS 3 0 - 5 %    BASOPHILS 0 0 - 2 %    ABS. NEUTROPHILS 3.4 1.8 - 8.0 K/UL    ABS. LYMPHOCYTES 1.2 0.9 - 3.6 K/UL    ABS. MONOCYTES 0.3 0.05 - 1.2 K/UL    ABS. EOSINOPHILS 0.1 0.0 - 0.4 K/UL    ABS. BASOPHILS 0.0 0.0 - 0.1 K/UL    DF AUTOMATED     METABOLIC PANEL, COMPREHENSIVE    Collection Time: 02/01/20  6:30 PM   Result Value Ref Range    Sodium 141 136 - 145 mmol/L    Potassium 4.2 3.5 - 5.5 mmol/L    Chloride 109 100 - 111 mmol/L    CO2 30 21 - 32 mmol/L    Anion gap 2 (L) 3.0 - 18 mmol/L    Glucose 87 74 - 99 mg/dL    BUN 9 7.0 - 18 MG/DL    Creatinine 0.52 (L) 0.6 - 1.3 MG/DL    BUN/Creatinine ratio 17 12 - 20      GFR est AA >60 >60 ml/min/1.73m2    GFR est non-AA >60 >60 ml/min/1.73m2    Calcium 8.5 8.5 - 10.1 MG/DL    Bilirubin, total 0.3 0.2 - 1.0 MG/DL    ALT (SGPT) 22 13 - 56 U/L    AST (SGOT) 25 10 - 38 U/L    Alk.  phosphatase 86 45 - 117 U/L    Protein, total 7.0 6.4 - 8.2 g/dL    Albumin 3.3 (L) 3.4 - 5.0 g/dL    Globulin 3.7 2.0 - 4.0 g/dL    A-G Ratio 0.9 0.8 - 1.7     LIPASE    Collection Time: 02/01/20  6:30 PM   Result Value Ref Range    Lipase 268 73 - 393 U/L   URINALYSIS W/ RFLX MICROSCOPIC    Collection Time: 02/01/20  6:30 PM   Result Value Ref Range    Color YELLOW      Appearance CLEAR      Specific gravity 1.017 1.005 - 1.030      pH (UA) 6.0 5.0 - 8.0      Protein NEGATIVE  NEG mg/dL    Glucose NEGATIVE  NEG mg/dL    Ketone NEGATIVE  NEG mg/dL    Bilirubin NEGATIVE  NEG      Blood NEGATIVE  NEG      Urobilinogen 0.2 0.2 - 1.0 EU/dL    Nitrites NEGATIVE  NEG      Leukocyte Esterase NEGATIVE  NEG     CARDIAC PANEL,(CK, CKMB & TROPONIN)    Collection Time: 02/01/20  6:30 PM   Result Value Ref Range    CK 74 26 - 192 U/L    CK - MB <1.0 <3.6 ng/ml    CK-MB Index  0.0 - 4.0 %     CALCULATION NOT PERFORMED WHEN RESULT IS BELOW LINEAR LIMIT    Troponin-I, QT <0.02 0.0 - 0.045 NG/ML   MAGNESIUM    Collection Time: 02/01/20  6:30 PM   Result Value Ref Range    Magnesium 2.1 1.6 - 2.6 mg/dL   HCG URINE, QL. - POC    Collection Time: 02/01/20  6:38 PM   Result Value Ref Range    Pregnancy test,urine (POC) NEGATIVE  NEG         Radiologic Studies -   CT HEAD WO CONT   Final Result   IMPRESSION:      No acute intracranial abnormalities. CT Results  (Last 48 hours)               02/01/20 1911  CT HEAD WO CONT Final result    Impression:  IMPRESSION:       No acute intracranial abnormalities. Narrative:  EXAM: CT HEAD WO CONT       CLINICAL INDICATION/HISTORY: headache and dizziness       COMPARISON: None. TECHNIQUE: Axial CT imaging of the head was performed without intravenous   contrast. Sagittal and coronal reconstructions are provided. One or more dose   reduction techniques were used on this CT: automated exposure control,   adjustment of the mAs and/or kVp according to patient size, and iterative   reconstruction techniques. The specific techniques used on this CT exam have   been documented in the patient's electronic medical record. Digital Imaging and   Communications in Medicine (DICOM) format image data are available to   nonaffiliated external healthcare facilities or entities on a secure, media   free, reciprocally searchable basis with patient authorization for at least a   12-month period after this study           _______________       FINDINGS:       BRAIN AND POSTERIOR FOSSA: The sulci, folia, ventricles and basal cisterns are   within normal limits for the patient's age. There is no intracranial hemorrhage,   mass effect, or midline shift. There are no areas of abnormal parenchymal   attenuation.        EXTRA-AXIAL SPACES AND MENINGES: There are no abnormal extra-axial fluid   collections. CALVARIUM: Intact. SINUSES: Clear. OTHER: None.       _______________               CXR Results  (Last 48 hours)    None          Medications given in the ED-  Medications   sodium chloride 0.9 % bolus infusion 1,000 mL (1,000 mL IntraVENous New Bag 2/1/20 1839)   prochlorperazine (COMPAZINE) injection 10 mg (10 mg IntraVENous Given 2/1/20 1840)         Medical Decision Making   I am the first provider for this patient. I reviewed the vital signs, available nursing notes, past medical history, past surgical history, family history and social history. Vital Signs-Reviewed the patient's vital signs. Pulse Oximetry Analysis - 100% on RA     Records Reviewed: Nursing Notes    Procedures:  Procedures    ED Course:   6:16 PM   Initial assessment performed. The patients presenting problems have been discussed, and they are in agreement with the care plan formulated and outlined with them. I have encouraged them to ask questions as they arise throughout their visit. 7:16 PM  Patient feeling better after IV fluids. She is inquiring about discharge. She is asking for IV to be removed. Labs unremarkable CT had still pending. Encouraged to stay to get the remainder of her IV fluids also to wait for review of CT head. She is agreeable to plan    Discussion: 38 y.o. female 2 years status post gastric bypass by Dr. Parth Tao also 2 weeks status post laparotomy for intussusception complaining of headache dizziness and nonbloody diarrhea. Patient expressing concerns for dehydration. She is afebrile with appropriate vital signs she is neurovascularly intact with benign physical exam.  Laboratory findings CT head all negative. Patient feeling better after IV fluids. She is been up and ambulatory without difficulty. Will plan for Zofran hydration and close follow-up with Rajni and strict return precautions discussed.          Diagnosis and Disposition       DISCHARGE NOTE:  Gualberto King's  results have been reviewed with her. She has been counseled regarding her diagnosis, treatment, and plan. She verbally conveys understanding and agreement of the signs, symptoms, diagnosis, treatment and prognosis and additionally agrees to follow up as discussed. She also agrees with the care-plan and conveys that all of her questions have been answered. I have also provided discharge instructions for her that include: educational information regarding their diagnosis and treatment, and list of reasons why they would want to return to the ED prior to their follow-up appointment, should her condition change. She has been provided with education for proper emergency department utilization. CLINICAL IMPRESSION:    1. Nonintractable headache, unspecified chronicity pattern, unspecified headache type    2. Dizziness    3. Diarrhea, unspecified type        PLAN:  1. D/C Home  2. Current Discharge Medication List      START taking these medications    Details   ondansetron (ZOFRAN ODT) 4 mg disintegrating tablet Take 1 Tab by mouth every eight (8) hours as needed for Nausea or Vomiting. Qty: 20 Tab, Refills: 0           3. Follow-up Information     Follow up With Specialties Details Why Contact Info    your PCP  Schedule an appointment as soon as possible for a visit      [facility] EMERGENCY DEPT Emergency Medicine  As needed, If symptoms worsen [address]  [phone]    Lance Ibarra MD [address] [place] [address], General Surgery Call  [address]  [address] [place] [address]  [phone]                    Please note that this dictation was completed with Bloxy, the computer voice recognition software. Quite often unanticipated grammatical, syntax, homophones, and other interpretive errors are inadvertently transcribed by the computer software. Please disregard these errors.   Please excuse any errors that have escaped final proofreading.

## 2020-02-01 NOTE — ED TRIAGE NOTES
Patient is post op appendectomy 01/15/2020. Patient c/o nausea and intermittent dizziness since this morning. Patient also endorses diarrhea. Patient states she just finished a round of Keflex today. Patient denies vomiting. Patient states she may be dehydrated and have a UTI.

## 2020-02-02 NOTE — DISCHARGE INSTRUCTIONS
Patient Education        Diarrhea: Care Instructions  Your Care Instructions    Diarrhea is loose, watery stools (bowel movements). The exact cause is often hard to find. Sometimes diarrhea is your body's way of getting rid of what caused an upset stomach. Viruses, food poisoning, and many medicines can cause diarrhea. Some people get diarrhea in response to emotional stress, anxiety, or certain foods. Almost everyone has diarrhea now and then. It usually isn't serious, and your stools will return to normal soon. The important thing to do is replace the fluids you have lost, so you can prevent dehydration. The doctor has checked you carefully, but problems can develop later. If you notice any problems or new symptoms, get medical treatment right away. Follow-up care is a key part of your treatment and safety. Be sure to make and go to all appointments, and call your doctor if you are having problems. It's also a good idea to know your test results and keep a list of the medicines you take. How can you care for yourself at home? · Watch for signs of dehydration, which means your body has lost too much water. Dehydration is a serious condition and should be treated right away. Signs of dehydration are:  ? Increasing thirst and dry eyes and mouth. ? Feeling faint or lightheaded. ? A smaller amount of urine than normal.  · To prevent dehydration, drink plenty of fluids. Choose water and other caffeine-free clear liquids until you feel better. If you have kidney, heart, or liver disease and have to limit fluids, talk with your doctor before you increase the amount of fluids you drink. · Begin eating small amounts of mild foods the next day, if you feel like it. ? Try yogurt that has live cultures of Lactobacillus. (Check the label.)  ? Avoid spicy foods, fruits, alcohol, and caffeine until 48 hours after all symptoms are gone. ? Avoid chewing gum that contains sorbitol. ?  Avoid dairy products (except for yogurt with Lactobacillus) while you have diarrhea and for 3 days after symptoms are gone. · The doctor may recommend that you take over-the-counter medicine, such as loperamide (Imodium), if you still have diarrhea after 6 hours. Read and follow all instructions on the label. Do not use this medicine if you have bloody diarrhea, a high fever, or other signs of serious illness. Call your doctor if you think you are having a problem with your medicine. When should you call for help? Call 911 anytime you think you may need emergency care. For example, call if:    · You passed out (lost consciousness).     · Your stools are maroon or very bloody.    Call your doctor now or seek immediate medical care if:    · You are dizzy or lightheaded, or you feel like you may faint.     · Your stools are black and look like tar, or they have streaks of blood.     · You have new or worse belly pain.     · You have symptoms of dehydration, such as:  ? Dry eyes and a dry mouth. ? Passing only a little dark urine. ? Feeling thirstier than usual.     · You have a new or higher fever.    Watch closely for changes in your health, and be sure to contact your doctor if:    · Your diarrhea is getting worse.     · You see pus in the diarrhea.     · You are not getting better after 2 days (48 hours). Where can you learn more? Go to http://jorge-prisca.info/. Enter V343 in the search box to learn more about \"Diarrhea: Care Instructions. \"  Current as of: June 26, 2019  Content Version: 12.2  © 7345-5684 [employer]. Care instructions adapted under license by [employer] (which disclaims liability or warranty for this information). If you have questions about a medical condition or this instruction, always ask your healthcare professional. [place] [address] any warranty or liability for your use of this information.          Patient Education        Dizziness: Care Instructions  Your Care Instructions  Dizziness is the feeling of unsteadiness or fuzziness in your head. It is different than having vertigo, which is a feeling that the room is spinning or that you are moving or falling. It is also different from lightheadedness, which is the feeling that you are about to faint. It can be hard to know what causes dizziness. Some people feel dizzy when they have migraine headaches. Sometimes bouts of flu can make you feel dizzy. Some medical conditions, such as heart problems or high blood pressure, can make you feel dizzy. Many medicines can cause dizziness, including medicines for high blood pressure, pain, or anxiety. If a medicine causes your symptoms, your doctor may recommend that you stop or change the medicine. If it is a problem with your heart, you may need medicine to help your heart work better. If there is no clear reason for your symptoms, your doctor may suggest watching and waiting for a while to see if the dizziness goes away on its own. Follow-up care is a key part of your treatment and safety. Be sure to make and go to all appointments, and call your doctor if you are having problems. It's also a good idea to know your test results and keep a list of the medicines you take. How can you care for yourself at home? · If your doctor recommends or prescribes medicine, take it exactly as directed. Call your doctor if you think you are having a problem with your medicine. · Do not drive while you feel dizzy. · Try to prevent falls. Steps you can take include:  ? Using nonskid mats, adding grab bars near the tub, and using night-lights. ? Clearing your home so that walkways are free of anything you might trip on.  ? Letting family and friends know that you have been feeling dizzy. This will help them know how to help you. When should you call for help? Call 911 anytime you think you may need emergency care.  For example, call if:    · You passed out (lost consciousness).     · You have dizziness along with symptoms of a heart attack. These may include:  ? Chest pain or pressure, or a strange feeling in the chest.  ? Sweating. ? Shortness of breath. ? Nausea or vomiting. ? Pain, pressure, or a strange feeling in the back, neck, jaw, or upper belly or in one or both shoulders or arms. ? Lightheadedness or sudden weakness. ? A fast or irregular heartbeat.     · You have symptoms of a stroke. These may include:  ? Sudden numbness, tingling, weakness, or loss of movement in your face, arm, or leg, especially on only one side of your body. ? Sudden vision changes. ? Sudden trouble speaking. ? Sudden confusion or trouble understanding simple statements. ? Sudden problems with walking or balance. ? A sudden, severe headache that is different from past headaches.    Call your doctor now or seek immediate medical care if:    · You feel dizzy and have a fever, headache, or ringing in your ears.     · You have new or increased nausea and vomiting.     · Your dizziness does not go away or comes back.    Watch closely for changes in your health, and be sure to contact your doctor if:    · You do not get better as expected. Where can you learn more? Go to http://jorge-prisca.info/. Enter Q675 in the search box to learn more about \"Dizziness: Care Instructions. \"  Current as of: June 26, 2019  Content Version: 12.2  © 1180-4877 [employer]. Care instructions adapted under license by [employer] (which disclaims liability or warranty for this information). If you have questions about a medical condition or this instruction, always ask your healthcare professional. [place] [address] any warranty or liability for your use of this information. Patient Education        Headache: Care Instructions  Your Care Instructions    Headaches have many possible causes.  Most headaches aren't a sign of a more serious problem, and they will get better on their own. Home treatment may help you feel better faster. The doctor has checked you carefully, but problems can develop later. If you notice any problems or new symptoms, get medical treatment right away. Follow-up care is a key part of your treatment and safety. Be sure to make and go to all appointments, and call your doctor if you are having problems. It's also a good idea to know your test results and keep a list of the medicines you take. How can you care for yourself at home? · Do not drive if you have taken a prescription pain medicine. · Rest in a quiet, dark room until your headache is gone. Close your eyes and try to relax or go to sleep. Don't watch TV or read. · Put a cold, moist cloth or cold pack on the painful area for 10 to 20 minutes at a time. Put a thin cloth between the cold pack and your skin. · Use a warm, moist towel or a heating pad set on low to relax tight shoulder and neck muscles. · Have someone gently massage your neck and shoulders. · Take pain medicines exactly as directed. ? If the doctor gave you a prescription medicine for pain, take it as prescribed. ? If you are not taking a prescription pain medicine, ask your doctor if you can take an over-the-counter medicine. · Be careful not to take pain medicine more often than the instructions allow, because you may get worse or more frequent headaches when the medicine wears off. · Do not ignore new symptoms that occur with a headache, such as a fever, weakness or numbness, vision changes, or confusion. These may be signs of a more serious problem. To prevent headaches  · Keep a headache diary so you can figure out what triggers your headaches. Avoiding triggers may help you prevent headaches. Record when each headache began, how long it lasted, and what the pain was like (throbbing, aching, stabbing, or dull).  Write down any other symptoms you had with the headache, such as nausea, flashing lights or dark spots, or sensitivity to bright light or loud noise. Note if the headache occurred near your period. List anything that might have triggered the headache, such as certain foods (chocolate, cheese, wine) or odors, smoke, bright light, stress, or lack of sleep. · Find healthy ways to deal with stress. Headaches are most common during or right after stressful times. Take time to relax before and after you do something that has caused a headache in the past.  · Try to keep your muscles relaxed by keeping good posture. Check your jaw, face, neck, and shoulder muscles for tension, and try relaxing them. When sitting at a desk, change positions often, and stretch for 30 seconds each hour. · Get plenty of sleep and exercise. · Eat regularly and well. Long periods without food can trigger a headache. · Treat yourself to a massage. Some people find that regular massages are very helpful in relieving tension. · Limit caffeine by not drinking too much coffee, tea, or soda. But don't quit caffeine suddenly, because that can also give you headaches. · Reduce eyestrain from computers by blinking frequently and looking away from the computer screen every so often. Make sure you have proper eyewear and that your monitor is set up properly, about an arm's length away. · Seek help if you have depression or anxiety. Your headaches may be linked to these conditions. Treatment can both prevent headaches and help with symptoms of anxiety or depression. When should you call for help? Call 911 anytime you think you may need emergency care. For example, call if:    · You have signs of a stroke. These may include:  ? Sudden numbness, paralysis, or weakness in your face, arm, or leg, especially on only one side of your body. ? Sudden vision changes. ? Sudden trouble speaking. ? Sudden confusion or trouble understanding simple statements. ? Sudden problems with walking or balance.   ? A sudden, severe headache that is different from past headaches.    Call your doctor now or seek immediate medical care if:    · You have a new or worse headache.     · Your headache gets much worse. Where can you learn more? Go to http://jorge-prisca.info/. Enter M271 in the search box to learn more about \"Headache: Care Instructions. \"  Current as of: March 28, 2019  Content Version: 12.2  © 7873-9875 [employer]. Care instructions adapted under license by [employer] (which disclaims liability or warranty for this information). If you have questions about a medical condition or this instruction, always ask your healthcare professional. [address] any warranty or liability for your use of this information.

## 2020-02-09 ENCOUNTER — HOSPITAL ENCOUNTER (OUTPATIENT)
Dept: LAB | Age: 40
Discharge: HOME | End: 2020-02-09

## 2020-02-12 ENCOUNTER — OFFICE VISIT (OUTPATIENT)
Dept: SURGERY | Age: 40
End: 2020-02-12

## 2020-02-12 VITALS
BODY MASS INDEX: 26.66 KG/M2 | RESPIRATION RATE: 16 BRPM | HEART RATE: 60 BPM | TEMPERATURE: 98.2 F | SYSTOLIC BLOOD PRESSURE: 115 MMHG | WEIGHT: 127 LBS | DIASTOLIC BLOOD PRESSURE: 75 MMHG | OXYGEN SATURATION: 100 % | HEIGHT: 58 IN

## 2020-02-12 DIAGNOSIS — K90.9 INTESTINAL MALABSORPTION, UNSPECIFIED TYPE: Primary | ICD-10-CM

## 2020-02-12 NOTE — PROGRESS NOTES
Subjective:     Charity Barcenas  is a 38 y.o. female who presents for follow-up about 1 month following laparotomy for resection of ileocecal intussusception . Body mass index is 26.54 kg/m². She has lost 4 lbs over the last 20 days    Pain assessment - 2    Surgery related complication: NA       She reports intermittent diarrhea and denies vomiting. The patient's exercise level: not active. Changes in her medical history and medications have been reviewed. Patient Active Problem List   Diagnosis Code    Constipation K59.00    History of colon polyps Z86.010    Intestinal malabsorption K90.9    S/P gastric bypass Z98.84    Hypokalemia E87.6    Hypomagnesemia E83.42    Iron deficiency E61.1    Pica F50.89    Intertriginous candidiasis B37.2    Internal hernia K45.8    Small bowel obstruction ([address] K56.609    History of intussusception Z87.19     Past Medical History:   Diagnosis Date    Constipation     Functional dyspepsia     uses OTC meds    GERD (gastroesophageal reflux disease)     History of colon polyps     History of intussusception 2020    1/15/2020 Diagnostic laparoscopy changed to open laparotomy by Dr. Carlos Banerjee Distal small bowel resection with Ileocecal resection with anastomosis.     Morbid obesity ([address]     Morbid obesity with body mass index (BMI) of 40.0 to 49.9 (HCC)     Nausea & vomiting 2018    SBO (small bowel obstruction) (HCC)      Past Surgical History:   Procedure Laterality Date    ABDOMEN SURGERY PROC UNLISTED      adhensions on liver    COLONOSCOPY      X 3    DELIVERY       X 3    HX  SECTION      HX COLECTOMY      HX DILATION AND CURETTAGE      HX GASTRIC BYPASS      HX GI      gastric bypass    HX OTHER SURGICAL      scar resection back of head    HX TUBAL LIGATION      HX UROLOGICAL  2001    stent    IR URETERAL STENT PLACEMENT         Objective:     Visit Vitals  /75 (BP 1 Location: Left arm, BP Patient Position: Sitting)   Pulse 60   Temp 98.2 °F (36.8 °C)   Resp 16   Ht 4' 10\" (1.473 m)   Wt 57.6 kg (127 lb)   LMP 01/21/2020 (Exact Date)   SpO2 100%   BMI 26.54 kg/m²        Physical Exam:    General:  alert, cooperative, no distress, appears stated age   Pulm: clear to auscultation bilaterally   Heart:  Regular rate and rhythm   Abdomen:   abdomen is soft without significant tenderness, masses, organomegaly or guarding; Incisions: healing well, no significant drainage       Assessment:     1. History of Morbid obesity, status post  laparotomy for resection of ileocecal intussusception . The patient  saw Dr Troy Mckeon who feels the Questran prn is a good idea. She still needs to obtain a C Diff titer done. Plan:     1. Remember to measure portions, continue low carbohydrate diet  2. Advance diet without difficulty. 3. Remember vitamin supplements. 4. Add weight resistance to exercise. 5. Attend support group  6. Follow-up in 1 month(s).   7. The patient understands the plan of action

## 2020-02-13 ENCOUNTER — HOSPITAL ENCOUNTER (OUTPATIENT)
Dept: LAB | Age: 40
Discharge: HOME | End: 2020-02-13
Payer: OTHER GOVERNMENT

## 2020-02-13 PROCEDURE — 87506 IADNA-DNA/RNA PROBE TQ 6-11: CPT

## 2020-02-14 LAB
CAMPYLOBACTER SPECIES, DNA: NEGATIVE
ENTEROTOXIGEN E COLI, DNA: NEGATIVE
P SHIGELLOIDES DNA STL QL NAA+PROBE: NEGATIVE
SALMONELLA SPECIES, DNA: NEGATIVE
SHIGA TOXIN PRODUCING, DNA: NEGATIVE
SHIGELLA SP+EIEC IPAH STL QL NAA+PROBE: NEGATIVE
VIBRIO SPECIES, DNA: NEGATIVE
Y. ENTEROCOLITICA, DNA: NEGATIVE

## 2020-03-04 ENCOUNTER — OFFICE VISIT (OUTPATIENT)
Dept: SURGERY | Age: 40
End: 2020-03-04

## 2020-03-04 VITALS
HEART RATE: 65 BPM | BODY MASS INDEX: 26.66 KG/M2 | DIASTOLIC BLOOD PRESSURE: 89 MMHG | SYSTOLIC BLOOD PRESSURE: 122 MMHG | OXYGEN SATURATION: 100 % | WEIGHT: 127 LBS | HEIGHT: 58 IN

## 2020-03-04 DIAGNOSIS — Z98.84 S/P BARIATRIC SURGERY: ICD-10-CM

## 2020-03-04 DIAGNOSIS — K59.09 OTHER CONSTIPATION: ICD-10-CM

## 2020-03-04 DIAGNOSIS — R10.13 EPIGASTRIC PAIN: ICD-10-CM

## 2020-03-04 DIAGNOSIS — R10.12 LEFT UPPER QUADRANT PAIN: ICD-10-CM

## 2020-03-04 DIAGNOSIS — K90.9 INTESTINAL MALABSORPTION, UNSPECIFIED TYPE: Primary | ICD-10-CM

## 2020-03-04 DIAGNOSIS — Z98.84 H/O GASTRIC BYPASS: ICD-10-CM

## 2020-03-04 RX ORDER — SUCRALFATE 1 G/10ML
1 SUSPENSION ORAL 4 TIMES DAILY
Qty: 1242 ML | Refills: 1 | Status: SHIPPED | OUTPATIENT
Start: 2020-03-04 | End: 2020-04-29

## 2020-03-04 RX ORDER — POLYETHYLENE GLYCOL 3350 17 G/17G
17 POWDER, FOR SOLUTION ORAL DAILY
Qty: 1700 G | Refills: 1 | Status: SHIPPED | OUTPATIENT
Start: 2020-03-04 | End: 2020-03-12

## 2020-03-04 RX ORDER — SUCRALFATE 1 G/1
1 TABLET ORAL 4 TIMES DAILY
Qty: 120 TAB | Refills: 1 | Status: SHIPPED | OUTPATIENT
Start: 2020-03-04 | End: 2020-03-12

## 2020-03-04 NOTE — PROGRESS NOTES
Subjective:      Wilma Schultz is a 38 y.o. female is now 2 years status post laparoscopic gastric bypass surgery. Doing well overall. She has lost a total of 103 pounds since surgery. Body mass index is 26.54 kg/m². Has lost 90% of EBW. March 2265 - uncomplicated gastric bypass by Dr. Elaine Yung  Oct 2018 - dx lap'scope for abd pain / lysis of adhesions (benign findings)  Jan 2019 - dx lap'scope for abd pain / lysis of adhesions (benign findings) by Dr. Kristie Nicholas at [facility]  April 2019 - dx lap'scope repair of internal hernia  Jan 2020 - dx lap'scope to open laparotomy for ileocecal intussusception, resection of ascending colon and portion of small bowel. Patient is here today because yesterday patient started having BMs and has had 4 as of now. She states it was clumps of formed stool. She states that at 11AM she developed LUQ pain that is 10/10 pain. The pain is cramping in nature and feels like broken glass trying to cut its way out. Pain does not radiate. She took some TUMS because she was thinking it was gastritis, but has had no relief. Nothing makes it worse. This happened 3 weeks ago. She went to the ED at [facility] and they did a CT scan, then gave her Lidocaine mixed with carafate and told her she had gastritis. She says that the carafate made her feel much better. She denies any NSAID use, steroid PO or inj, smoking or daily alcohol use.         Weight Loss Metrics 3/4/2020 2/12/2020 2/1/2020 1/27/2020 1/24/2020 1/21/2020 1/16/2020   Today's Wt 127 lb 127 lb 132 lb 130 lb 8 oz 131 lb 2 oz 139 lb 157 lb 4.8 oz   BMI 26.54 kg/m2 26.54 kg/m2 27.59 kg/m2 27.27 kg/m2 27.41 kg/m2 29.05 kg/m2 -             Patient Active Problem List   Diagnosis Code    Constipation K59.00    History of colon polyps Z86.010    Intestinal malabsorption K90.9    S/P gastric bypass Z98.84    Hypokalemia E87.6    Hypomagnesemia E83.42    Iron deficiency E61.1    Pica F50.89    Intertriginous candidiasis B37.2    Internal hernia K45.8    Small bowel obstruction ([address] K56.609    History of intussusception Z87.19        Past Medical History:   Diagnosis Date    Constipation     Functional dyspepsia     uses OTC meds    GERD (gastroesophageal reflux disease)     History of colon polyps     History of intussusception 2020    1/15/2020 Diagnostic laparoscopy changed to open laparotomy by Dr. Corral Prom Distal small bowel resection with Ileocecal resection with anastomosis.  Morbid obesity ([address]     Morbid obesity with body mass index (BMI) of 40.0 to 49.9 (HCC)     Nausea & vomiting 2018    SBO (small bowel obstruction) (HCC)        Past Surgical History:   Procedure Laterality Date    ABDOMEN SURGERY PROC UNLISTED      adhensions on liver    COLONOSCOPY      X 3    DELIVERY       X 3    HX  SECTION      HX COLECTOMY      HX DILATION AND CURETTAGE      HX GASTRIC BYPASS      HX GI      gastric bypass    HX OTHER SURGICAL      scar resection back of head    HX TUBAL LIGATION      HX UROLOGICAL  2001    stent    IR URETERAL STENT PLACEMENT         Current Outpatient Medications   Medication Sig Dispense Refill    ondansetron (ZOFRAN ODT) 4 mg disintegrating tablet Take 1 Tab by mouth every eight (8) hours as needed for Nausea or Vomiting. 20 Tab 0    ergocalciferol (ERGOCALCIFEROL) 1,250 mcg (50,000 unit) capsule Take 1 cap by mouth twice weekly for 12 months 52 Cap 1    oxyCODONE-acetaminophen (PERCOCET) 5-325 mg per tablet Take  by mouth every four (4) hours as needed for Pain.  cyanocobalamin (VITAMIN B-12) 1,000 mcg sublingual tablet Take 1,000 mcg by mouth daily.  multivit-minerals/folic acid (ADULT MULTIVITAMIN GUMMIES PO) Take  by mouth.  calcium citrate 200 mg (950 mg) tablet Take 200 mg by mouth two (2) times a day.          Allergies   Allergen Reactions    Niacin Other (comments)    Morphine Hives and Itching       Review of Systems:  General - No history or complaints of unexpected fever or chills  Head/Neck - No history or complaints of headache or dizziness  Cardiac - No history or complaints of chest pain, palpitations, or shortness of breath  Pulmonary - No history or complaints of shortness of breath or productive cough  Gastrointestinal - as noted above  Genitourinary - No history or complaints of hematuria/dysuria or renal lithiasis  Musculoskeletal - No history or complaints of joint  muscular weakness  Hematologic - No history of any bleeding episodes  Neurologic - No history or complaints of  migraine headaches or neurologic symptoms    Objective:     Visit Vitals  /89 (BP 1 Location: Left arm, BP Patient Position: Sitting)   Pulse 65   Ht 4' 10\" (1.473 m)   Wt 57.6 kg (127 lb)   SpO2 100%   BMI 26.54 kg/m²       General:  alert, cooperative, no distress, appears stated age   Chest: lungs clear to auscultation, breath sounds equal and symmetric, no rhonchi, rales or wheezes, no accessory muscle use   Cor:   Regular rate and rhythm or without murmur or extra heart sounds   Abdomen:  LUQ ttp, soft, bowel sounds active, no masses or organomegaly   Incisions:   healed well     2/13/2020   CT:  ABDOMEN CT WITH CONTRAST:    No significant abnormality is identified at the lung bases. The heart size is normal.    Postsurgical changes in the left upper quadrant suggest gastric bypass surgery. Correlation with surgical history is recommended. Several cysts are identified in the right hepatic dome and lateral left hepatic lobe. These appear stable. No suspicious hepatic lesions are identified. No focal abnormalities are seen in the gallbladder, spleen, kidneys, adrenal glands or pancreas. The kidneys enhance normally without evidence of hydronephrosis. There is no mass, free fluid, free air, or inflammatory process in the upper abdomen. Postsurgical changes are identified in the right lower quadrant.  An intussusception was identified in this region on the previous exam.    No abnormal bowel wall thickening or intussusception is identified. Moderate retained stool is identified within the colon. The bowel does not appear obstructed. A midline infraumbilical surgical scar is identified. PELVIS CT WITH CONTRAST:    Scans were continued through the pelvis. The urinary bladder, uterus, and adnexal structures are within normal limits. A partially crenated follicle with marginal enhancement is favored in the posterior right hemipelvis measuring 20 mm in diameter (series 2 image 62). This appears smaller than the previous exam.    There is no evidence of pelvic adenopathy. There is no free fluid or loculated fluid collection. No significant osseous abnormality is identified. IMPRESSION    Interval post surgical changes are identified in the right lower quadrant. A previously described intussusception is not identified. There is evidence of prior gastric bypass surgery and correlation with surgical history is recommended. The bowel does not appear obstructed. There is evidence of constipation. Assessment:   History of Morbid obesity, status post laparoscopic gastric bypass surgery. Doing well postoperatively. LUQ pain - I have reviewed the results of the CT scan on 2/13/2020 where she was found to have constipation and no anatomical defects. I have spoken to patient about this at length. I have given her prescriptions for Carafate and Miralax. She has a PPI at home, but has not been taking it. I asked her to take her PPI, start the carafate and if it makes a difference then we will proceed with an EGD to evaluate for an anastomotic ulcer. She is to take the Miralax today as well as MOM or Mag Citrate to eliminate any retained stool. If this does not alleviate her pain, she is to go to the ED [facility].   I explained that Dr. Carmen Chen is out of town and that Dr. Florentin Barrow is covering for him at [employer] [facility] [employer]. She agrees to proceed to the ED if her pain does not dissipate or if it worsens. She stated that the more we talked about constipation, the more she thinks that is what is causing her pain and she admittedly does not drink enough fluids. She did say that she was warned that since surgical repair of her intussusception that her bowel habits would likely change. She does have a f/u appt with me next week. Plan:       1. Discussed patients weight loss goals and dietary choices in relation to goals. 2. Sleep goal is 7-9 hours each night. Patient education given on the effects of sleep deprivation on weight control. 3. Reminded to measure portions, continue high protein, low carbohydrate diet. Reminded to eat regularly, to eat slowly & not to drink with meals. 4. Continue vitamin supplementation  5. Continue current medications and follow up with PCP for management of regimen. 6. Continue cardio exercise and add resistance exercises. 60-90 minutes of aerobic activity 5 days a week and strength training 2 days each week. 7. Encouraged to attend support group   8. Lab slip given today. 9. I have discussed this plan with patient and they verbalized understanding  10. Follow up in 6 months or sooner if patient has questions, concerns or worsening of condition, if unable to reach our office, patient should report to the ED. 6. Ms. Sharren Kayser has a reminder for a \"due or due soon\" health maintenance. I have asked that she contact her primary care provider for a follow-up on this health maintenance.

## 2020-03-04 NOTE — PROGRESS NOTES
1. Have you been to the ER, urgent care clinic since your last visit? Hospitalized since your last visit? No    2. Have you seen or consulted any other health care providers outside of the [address] [place] [address] since your last visit? Include any pap smears or colon screening.  No        Chief Complaint   Patient presents with    Abdominal Pain

## 2020-03-04 NOTE — PATIENT INSTRUCTIONS
Patient Instructions      1. Remember hydration goals - minimum of 64 ounces of liquids per day (dehydration is the number one reason for hospital readmission). 2. Sleep 7-9 hours each night to keep your metabolism up. 3. Continue to monitor carbohydrate and protein intake you need a minimum of  Grams of protein daily- remember to keep your total carbohydrates to 50 grams or less per day for best results. 4. To maximize weight loss keep your caloric intake between 800-1,200 calories daily. If you are exercising excessively, such as training for a marathon, you need to keep a food log and meet with the dietician so they can advise you on your diet choices, carbohydrate intake and caloric intake. 5. Continue to work towards exercise goals - 60-90 minutes, 5 times a week minimum of deliberate, aerobic exercise is the ultimate goal with strength training 2 times each week. Refer to [employer] for  information. 6. Remember to take vitamins as directed in your handbook. 7. Attend support group the 2nd Thursday of each month. 8. Constipation: Milk of Magnesia is for immediate relief only. Miralax is to be used every day if constipation is a chronic problem. 9. Diarrhea: patients will occasionally develop lactose intolerance after surgery. Check to see if your protein shake has whey in it. If it does try a protein powder or drink that does not have whey and stop all yogurts, cheeses and milks to see if the diarrhea goes away. 10. If you have had labs drawn. We will only call you if you have abnormal results. Otherwise you can access the lab results in \"[employer]\". You will only need the access code the first time you sign on. 11. Call us at [phone] or email us through [email or web address]" with questions,     concerns or worsening of condition, we have someone on call 24 hours a day.   If you are unable to reach our office, you are to go to your Primary Care Physician or the Emergency Department. NOTE TO GASTRIC BYPASS PATIENTS:  (SAME APPLIES TO GASTRIC SLEEVE PATIENTS FOR FIRST TWO MONTHS)  Remember that for the rest of your life, you are not able to take the following:  - NSAIDs (ibuprofen, goody powder, BC powder, Motrin, Advil, Mobic, Voltaren, Excedrin, etc.)  - Steroid pills or injections  - Smoke (cigarettes or recreational drugs)  - Alcohol  Use of any of the above may cause ulcers in your stomach which may perforate causing a medical emergency and surgery. Speak to our medical staff if another medical provider requires you to take steroids or NSAIDs. Supplement Resource Guide    Importance of Protein:   Maintains lean body mass, produces antibodies to fight off infections, heals wounds, minimizes hair loss, helps to give you energy, helps with satiety, and keeping you full between meals. Importance of Calcium:  Needed for healthy bones and teeth, normal blood clotting, and nervous system functioning, higher risk of osteoporosis and bone disease with non-compliance. Importance of Multivitamins: Many functions. Supply you with extra nutrients that you may be missing from food. May lead to iron deficiency anemia, weakness, fatigue, and many other symptoms with non-compliance. Importance of B Vitamins:  Important for red blood cell formation, metabolism, energy, and helps to maintain a healthy nervous system. Protein Supplement  Liquid diet phase: consume 90-100g protein daily. Once you are eating consume  35-50g protein each day from your protein supplement. 0-3 g fat per serving  0-3 g sugar per serving    The body can only absorb 30g of protein at one time, so do not consume more than that at one time. Multi-vitamin Supplement:    Start immediately after surgery: any complete chewable, such as: [place]s Complete chewables. Avoid [place] sours or gummies.   They lack iron and other important nutrients and also have added sugar. Continue with a chewable vitamin or change to an adult complete multivitamin one month after surgery. Menstruating women can take a prenatal vitamin. Make sure it has at least 18 mg iron and 338-115 mcg folic acid Calcium Supplement:     Start taking within one month after surgery. Look for:   Calcium Citrate Plus D (1500 mg per day)    Recommend: Citracal    Avoid chocolate chewable calcium. Can use chewable bariatric or [employer] brand or similar chewable. The body cannot absorb more than 500-600 mg of calcium at one time. Take for Life    Vitamin D  Take 3,000 international units daily Vitamin B12  B Complex Vitamin  Start taking both within one month after surgery. Vitamin B12 (sublingual): Take 1000 mcg of Vitamin B12 three times weekly    Must take sublingually (meaning you put it under your tongue) or in a liquid drop form for easy absorption. B Complex Vitamin:   Take one pill daily or liquid drop form daily; as directed on bottle.      Take for Life

## 2020-03-05 ENCOUNTER — TELEPHONE (OUTPATIENT)
Dept: SURGERY | Age: 40
End: 2020-03-05

## 2020-03-05 NOTE — TELEPHONE ENCOUNTER
I called patient to see how she is doing today. She took the carafate and she says that she is having diarrhea now. She is still having some abdominal pain, but not as much. I told her again that if she needs to go to the ED she is to go to [facility] or [facility] because Dr. Chadwick Holcomb is not here. She said she would. I also told her that I am here all day if she needs me. I will call her again tomorrow.

## 2020-03-06 ENCOUNTER — TELEPHONE (OUTPATIENT)
Dept: SURGERY | Age: 40
End: 2020-03-06

## 2020-03-06 NOTE — TELEPHONE ENCOUNTER
I called patient to see how she is doing. She has been taking the carafate and PPI and she states she is feeling much better. She was having diarrhea yesterday, if this was constipation related, that piece of the equation is resolved at this point. I told her that Dr. Leo Jameson is back in town and that if her situation starts to worsen or if she has questions or concerns, she should come to the ED at [facility]. She can also call the answering service at our office and that they will pass the message on to Dr. Leo Jameson if our office is closed. Patient does have a f/u appt with me next week, we discussed possibly doing a CT scan if she has not completely recovered at that point. She agrees with the plan.

## 2020-03-12 ENCOUNTER — OFFICE VISIT (OUTPATIENT)
Dept: SURGERY | Age: 40
End: 2020-03-12

## 2020-03-12 VITALS
RESPIRATION RATE: 16 BRPM | DIASTOLIC BLOOD PRESSURE: 69 MMHG | OXYGEN SATURATION: 100 % | HEART RATE: 65 BPM | BODY MASS INDEX: 26.47 KG/M2 | HEIGHT: 58 IN | WEIGHT: 126.1 LBS | SYSTOLIC BLOOD PRESSURE: 115 MMHG

## 2020-03-12 DIAGNOSIS — R19.7 DIARRHEA, UNSPECIFIED TYPE: ICD-10-CM

## 2020-03-12 DIAGNOSIS — R10.84 GENERALIZED ABDOMINAL PAIN: ICD-10-CM

## 2020-03-12 DIAGNOSIS — K21.9 GASTROESOPHAGEAL REFLUX DISEASE WITHOUT ESOPHAGITIS: ICD-10-CM

## 2020-03-12 DIAGNOSIS — Z98.84 S/P BARIATRIC SURGERY: ICD-10-CM

## 2020-03-12 DIAGNOSIS — Z87.19 HISTORY OF INTUSSUSCEPTION: ICD-10-CM

## 2020-03-12 DIAGNOSIS — K90.9 INTESTINAL MALABSORPTION, UNSPECIFIED TYPE: Primary | ICD-10-CM

## 2020-03-12 DIAGNOSIS — K59.09 OTHER CONSTIPATION: ICD-10-CM

## 2020-03-12 DIAGNOSIS — Z90.49 S/P PARTIAL COLECTOMY: ICD-10-CM

## 2020-03-12 RX ORDER — OMEPRAZOLE 20 MG/1
20 CAPSULE, DELAYED RELEASE ORAL
Qty: 60 CAP | Refills: 2 | Status: SHIPPED | OUTPATIENT
Start: 2020-03-12 | End: 2020-04-11

## 2020-03-12 RX ORDER — OMEPRAZOLE 20 MG/1
20 CAPSULE, DELAYED RELEASE ORAL DAILY
COMMUNITY

## 2020-03-12 NOTE — PATIENT INSTRUCTIONS
Patient Instructions      1. Remember hydration goals - minimum of 64 ounces of liquids per day (dehydration is the number one reason for hospital readmission). 2. Sleep 7-9 hours each night to keep your metabolism up. 3. Continue to monitor carbohydrate and protein intake you need a minimum of  Grams of protein daily- remember to keep your total carbohydrates to 50 grams or less per day for best results. 4. To maximize weight loss keep your caloric intake between 800-1,200 calories daily. If you are exercising excessively, such as training for a marathon, you need to keep a food log and meet with the dietician so they can advise you on your diet choices, carbohydrate intake and caloric intake. 5. Continue to work towards exercise goals - 60-90 minutes, 5 times a week minimum of deliberate, aerobic exercise is the ultimate goal with strength training 2 times each week. Refer to [employer] for  information. 6. Remember to take vitamins as directed in your handbook. 7. Attend support group the 2nd Thursday of each month. 8. Constipation: Milk of Magnesia is for immediate relief only. Miralax is to be used every day if constipation is a chronic problem. 9. Diarrhea: patients will occasionally develop lactose intolerance after surgery. Check to see if your protein shake has whey in it. If it does try a protein powder or drink that does not have whey and stop all yogurts, cheeses and milks to see if the diarrhea goes away. 10. If you have had labs drawn. We will only call you if you have abnormal results. Otherwise you can access the lab results in \"[employer]\". You will only need the access code the first time you sign on. 11. Call us at [phone] or email us through [email or web address]" with questions,     concerns or worsening of condition, we have someone on call 24 hours a day.   If you are unable to reach our office, you are to go to your Primary Care Physician or the Emergency Department. NOTE TO GASTRIC BYPASS PATIENTS:  (SAME APPLIES TO GASTRIC SLEEVE PATIENTS FOR FIRST TWO MONTHS)  Remember that for the rest of your life, you are not able to take the following:  - NSAIDs (ibuprofen, goody powder, BC powder, Motrin, Advil, Mobic, Voltaren, Excedrin, etc.)  - Steroid pills or injections  - Smoke (cigarettes or recreational drugs)  - Alcohol  Use of any of the above may cause ulcers in your stomach which may perforate causing a medical emergency and surgery. Speak to our medical staff if another medical provider requires you to take steroids or NSAIDs. Supplement Resource Guide    Importance of Protein:   Maintains lean body mass, produces antibodies to fight off infections, heals wounds, minimizes hair loss, helps to give you energy, helps with satiety, and keeping you full between meals. Importance of Calcium:  Needed for healthy bones and teeth, normal blood clotting, and nervous system functioning, higher risk of osteoporosis and bone disease with non-compliance. Importance of Multivitamins: Many functions. Supply you with extra nutrients that you may be missing from food. May lead to iron deficiency anemia, weakness, fatigue, and many other symptoms with non-compliance. Importance of B Vitamins:  Important for red blood cell formation, metabolism, energy, and helps to maintain a healthy nervous system. Protein Supplement  Liquid diet phase: consume 90-100g protein daily. Once you are eating consume  35-50g protein each day from your protein supplement. 0-3 g fat per serving  0-3 g sugar per serving    The body can only absorb 30g of protein at one time, so do not consume more than that at one time. Multi-vitamin Supplement:    Start immediately after surgery: any complete chewable, such as: [place]s Complete chewables. Avoid [place] sours or gummies.   They lack iron and other important nutrients and also have added sugar. Continue with a chewable vitamin or change to an adult complete multivitamin one month after surgery. Menstruating women can take a prenatal vitamin. Make sure it has at least 18 mg iron and 836-762 mcg folic acid Calcium Supplement:     Start taking within one month after surgery. Look for:   Calcium Citrate Plus D (1500 mg per day)    Recommend: Citracal    Avoid chocolate chewable calcium. Can use chewable bariatric or [employer] brand or similar chewable. The body cannot absorb more than 500-600 mg of calcium at one time. Take for Life    Vitamin D  Take 3,000 international units daily Vitamin B12  B Complex Vitamin  Start taking both within one month after surgery. Vitamin B12 (sublingual): Take 1000 mcg of Vitamin B12 three times weekly    Must take sublingually (meaning you put it under your tongue) or in a liquid drop form for easy absorption. B Complex Vitamin:   Take one pill daily or liquid drop form daily; as directed on bottle.      Take for Life

## 2020-03-12 NOTE — LETTER
3/12/2020 7:48 AM 
 
Ms. Oralia Ochoa [address] [facility] [place] [id number] To whom it may concern; Ms. Kushal Tom, is under the care of [facility].  She is cleared to work 4 hours a day until further notice. She has been referred to other providers for additional needed care. Please excuse her the morning of 3/12/2020 for her appt with our office. Please feel free to contact me with any questions or concerns you may have.  
 
 
Sincerely, 
 
 
 
 
CHIKIS Crow

## 2020-03-12 NOTE — PROGRESS NOTES
Subjective:      Bakari Moreira is a 38 y.o. female is now 2 years status post laparoscopic gastric bypass surgery. Doing well overall. She has lost a total of 104 pounds since surgery. Body mass index is 26.35 kg/m². Has lost 90% of EBW. March 3074 - uncomplicated gastric bypass by Dr. Hair Avitia  Oct 2018 - dx lap'scope for abd pain / lysis of adhesions (benign findings)  Jan 2019 - dx lap'scope for abd pain / lysis of adhesions (benign findings) by Dr. Shai Doshi at [place]  April 2019 - dx lap'scope repair of internal hernia  Jan 2020 - dx lap'scope to open laparotomy for ileocecal intussusception, resection of ascending colon and portion of small bowel. Currently on a solid food diet without difficulty, reports no issues regarding her gastric bypass and denies vomiting and abdominal pain. Taking in 40oz water daily. Sources of protein include chicken. She is also eating salads and sandwiches from [employer]. She is not exercising at this time. Patient is sleeping 7-8 hours a night on average. She is having night sweats. Patient is still having abdominal pain that is usually localized to her epigastric region. She has been having flatulence and night sweats as well. The night sweats started prior to her partial colectomy. Pain is a dull constant pain. Pain is 5/10. Taking Carafate and Prilosec make the pain better. Bowel movements are alternating constipation and regularity. The patient is compliant with multivitamins, calcium, Vit D and B12 supplements.      Weight Loss Metrics 3/12/2020 3/4/2020 2/12/2020 2/1/2020 1/27/2020 1/24/2020 1/21/2020   Today's Wt 126 lb 1.6 oz 127 lb 127 lb 132 lb 130 lb 8 oz 131 lb 2 oz 139 lb   BMI 26.35 kg/m2 26.54 kg/m2 26.54 kg/m2 27.59 kg/m2 27.27 kg/m2 27.41 kg/m2 29.05 kg/m2        Patient Active Problem List   Diagnosis Code    Constipation K59.00    History of colon polyps Z86.010    Intestinal malabsorption K90.9    S/P gastric bypass Z98.84    Hypokalemia E87.6    Hypomagnesemia E83.42    Iron deficiency E61.1    Pica F50.89    Intertriginous candidiasis B37.2    Internal hernia K45.8    Small bowel obstruction ([address] K56.609    History of intussusception Z87.19        Past Medical History:   Diagnosis Date    Constipation     Functional dyspepsia     uses OTC meds    GERD (gastroesophageal reflux disease)     History of colon polyps     History of intussusception 2020    1/15/2020 Diagnostic laparoscopy changed to open laparotomy by Dr. Marbella Lombardi Distal small bowel resection with Ileocecal resection with anastomosis.  Morbid obesity ([facility] [address]     Morbid obesity with body mass index (BMI) of 40.0 to 49.9 (HCC)     Nausea & vomiting 2018    SBO (small bowel obstruction) (HCC)        Past Surgical History:   Procedure Laterality Date    ABDOMEN SURGERY PROC UNLISTED      adhensions on liver    COLONOSCOPY      X 3    DELIVERY       X 3    HX  SECTION      HX COLECTOMY      HX COLECTOMY  2020    by Dr. Jose Luis Light      HX GASTRIC BYPASS      HX GI      gastric bypass    HX OTHER SURGICAL      scar resection back of head    HX TUBAL LIGATION      HX UROLOGICAL  2001    stent    IR URETERAL STENT PLACEMENT         Current Outpatient Medications   Medication Sig Dispense Refill    omeprazole (PRILOSEC) 20 mg capsule Take 20 mg by mouth daily.  omeprazole (PRILOSEC) 20 mg capsule Take 1 Cap by mouth Before breakfast and dinner for 30 days. 60 Cap 2    sucralfate (CARAFATE) 100 mg/mL suspension Take 10 mL by mouth four (4) times daily. 1242 mL 1    ergocalciferol (ERGOCALCIFEROL) 1,250 mcg (50,000 unit) capsule Take 1 cap by mouth twice weekly for 12 months 52 Cap 1    cyanocobalamin (VITAMIN B-12) 1,000 mcg sublingual tablet Take 1,000 mcg by mouth daily.  multivit-minerals/folic acid (ADULT MULTIVITAMIN GUMMIES PO) Take  by mouth.       calcium citrate 200 mg (950 mg) tablet Take 200 mg by mouth two (2) times a day. Allergies   Allergen Reactions    Niacin Other (comments)    Morphine Hives and Itching       Review of Systems:  General - No history or complaints of unexpected fever or chills  Head/Neck - No history or complaints of headache or dizziness  Cardiac - No history or complaints of chest pain, palpitations, or shortness of breath  Pulmonary - No history or complaints of shortness of breath or productive cough  Gastrointestinal - as noted above  Genitourinary - No history or complaints of hematuria/dysuria or renal lithiasis  Musculoskeletal - No history or complaints of joint  muscular weakness  Hematologic - No history of any bleeding episodes  Neurologic - No history or complaints of  migraine headaches or neurologic symptoms    Objective:     Visit Vitals  /69 (BP 1 Location: Right arm, BP Patient Position: Sitting)   Pulse 65   Resp 16   Ht 4' 10\" (1.473 m)   Wt 57.2 kg (126 lb 1.6 oz)   SpO2 100%   BMI 26.35 kg/m²       General:  alert, cooperative, no distress, appears stated age   Chest: lungs clear to auscultation, breath sounds equal and symmetric, no rhonchi, rales or wheezes, no accessory muscle use   Cor:   Regular rate and rhythm or without murmur or extra heart sounds   Abdomen: soft, bowel sounds active, non-tender, no masses or organomegaly   Incisions:   healed well       Assessment:   History of Morbid obesity, status post laparoscopic gastric bypass surgery and s/p partial colectomy. Abdominal pain - CT scan, Omeprazole sent to pharmacy, continue carafate, may need to do EGD depending on CT results and length of time to get to GI. Alternating diarrhea and constipation after partial colectomy - referral to GI, if it is going to take a significant period of time to be seen by GI, she will f/u with Dr. Carlyle Mejias again. Night sweats - patient is to f/u with her PCP    Plan:       1.  Discussed patients weight loss goals and dietary choices in relation to goals. 2. Sleep goal is 7-9 hours each night. Patient education given on the effects of sleep deprivation on weight control. 3. Reminded to measure portions, continue high protein, low carbohydrate diet. Reminded to eat regularly, to eat slowly & not to drink with meals. 4. Continue vitamin supplementation  5. Continue current medications and follow up with PCP for management of regimen. 6. Continue cardio exercise and add resistance exercises. 60-90 minutes of aerobic activity 5 days a week and strength training 2 days each week. 7. Encouraged to attend support groug  8. I have discussed this plan with patient and they verbalized understanding  9. Follow up after CT scan to review results or sooner if patient has questions, concerns or worsening of condition, if unable to reach our office, patient should report to the ED. 10. Ms. Sharren Kayser has a reminder for a \"due or due soon\" health maintenance. I have asked that she contact her primary care provider for a follow-up on this health maintenance.

## 2020-04-29 ENCOUNTER — OFFICE VISIT (OUTPATIENT)
Dept: SURGERY | Age: 40
End: 2020-04-29

## 2020-04-29 VITALS — BODY MASS INDEX: 26.87 KG/M2 | HEIGHT: 58 IN | WEIGHT: 128 LBS

## 2020-04-29 DIAGNOSIS — B37.2 INTERTRIGINOUS CANDIDIASIS: ICD-10-CM

## 2020-04-29 DIAGNOSIS — D64.9 ANEMIA, UNSPECIFIED TYPE: Primary | ICD-10-CM

## 2020-04-29 DIAGNOSIS — K90.9 INTESTINAL MALABSORPTION, UNSPECIFIED TYPE: ICD-10-CM

## 2020-04-29 DIAGNOSIS — E61.1 IRON DEFICIENCY: ICD-10-CM

## 2020-04-29 PROBLEM — K45.8 INTERNAL HERNIA: Status: RESOLVED | Noted: 2018-10-28 | Resolved: 2020-04-29

## 2020-04-29 PROBLEM — E83.42 HYPOMAGNESEMIA: Status: RESOLVED | Noted: 2018-06-06 | Resolved: 2020-04-29

## 2020-04-29 PROBLEM — E87.6 HYPOKALEMIA: Status: RESOLVED | Noted: 2018-06-06 | Resolved: 2020-04-29

## 2020-04-29 PROBLEM — K56.609 SMALL BOWEL OBSTRUCTION (HCC): Status: RESOLVED | Noted: 2020-01-14 | Resolved: 2020-04-29

## 2020-04-29 NOTE — PROGRESS NOTES
Subjective:     Karma Laureano  is a 38 y.o. female who presents for follow-up about 2.2 years following laparoscopic gastric bypass surgery. She has lost a total of 103 pounds since surgery. There is no height or weight on file to calculate BMI. . EBWL is (90%). The patient presents today to assess their progress toward their goal of weight loss and to address any issues that may be present. Today the patient and I have reviewed their diet and how appropriate their food choices are. The following is her complex surgical history:    March 3033 - uncomplicated gastric bypass by Dr. Zuleyka Workman  Oct 2018 - dx lap'scope for abd pain / lysis of adhesions (benign findings)  Jan 2019 - dx lap'scope for abd pain / lysis of adhesions (benign findings) by Dr. Aleyda Sheppard at [facility]  April 2019 - dx lap'scope repair of internal hernia  Jan 2020 - dx lap'scope to open laparotomy for ileocecal intussusception, resection of ascending colon and portion of small bowel.       Weight Loss Metrics 3/12/2020 3/4/2020 2/12/2020 2/1/2020 1/27/2020 1/24/2020 1/21/2020   Today's Wt 126 lb 1.6 oz 127 lb 127 lb 132 lb 130 lb 8 oz 131 lb 2 oz 139 lb   BMI 26.35 kg/m2 26.54 kg/m2 26.54 kg/m2 27.59 kg/m2 27.27 kg/m2 27.41 kg/m2 29.05 kg/m2     . Surgery related complication: none       She reports constipation which has been cured with linzess and denies vomiting and abdominal pain. The patients diet choices have been reviewed today and are  good  Patients pain score:0    Weight Loss Metrics 3/12/2020 3/4/2020 2/12/2020 2/1/2020 1/27/2020 1/24/2020 1/21/2020   Today's Wt 126 lb 1.6 oz 127 lb 127 lb 132 lb 130 lb 8 oz 131 lb 2 oz 139 lb   BMI 26.35 kg/m2 26.54 kg/m2 26.54 kg/m2 27.59 kg/m2 27.27 kg/m2 27.41 kg/m2 29.05 kg/m2        The patient's exercise level: very active. Changes in her medical history and medications have been reviewed.     Patient Active Problem List   Diagnosis Code    Constipation K59.00    History of colon polyps Z86.010    Intestinal malabsorption K90.9    S/P gastric bypass Z98.84    Hypokalemia E87.6    Hypomagnesemia E83.42    Iron deficiency E61.1    Pica F50.89    Intertriginous candidiasis B37.2    Internal hernia K45.8    Small bowel obstruction ([facility] [address] K56.609    History of intussusception Z87.19     Past Medical History:   Diagnosis Date    Constipation     Functional dyspepsia     uses OTC meds    GERD (gastroesophageal reflux disease)     History of colon polyps     History of intussusception 2020    1/15/2020 Diagnostic laparoscopy changed to open laparotomy by Dr. Chantale Everett Distal small bowel resection with Ileocecal resection with anastomosis.  Morbid obesity ([facility] [address]     Morbid obesity with body mass index (BMI) of 40.0 to 49.9 (HCC)     Nausea & vomiting 2018    SBO (small bowel obstruction) (HCC)      Past Surgical History:   Procedure Laterality Date    ABDOMEN SURGERY PROC UNLISTED      adhensions on liver    COLONOSCOPY      X 3    DELIVERY       X 3    HX  SECTION      HX COLECTOMY      HX COLECTOMY  2020    by Dr. Andrews 2005    HX GASTRIC BYPASS      HX GI      gastric bypass    HX OTHER SURGICAL      scar resection back of head    HX TUBAL LIGATION      HX UROLOGICAL  2001    stent    IR URETERAL STENT PLACEMENT       Current Outpatient Medications   Medication Sig Dispense Refill    omeprazole (PRILOSEC) 20 mg capsule Take 20 mg by mouth daily.  sucralfate (CARAFATE) 100 mg/mL suspension Take 10 mL by mouth four (4) times daily. 1242 mL 1    ergocalciferol (ERGOCALCIFEROL) 1,250 mcg (50,000 unit) capsule Take 1 cap by mouth twice weekly for 12 months 52 Cap 1    cyanocobalamin (VITAMIN B-12) 1,000 mcg sublingual tablet Take 1,000 mcg by mouth daily.  multivit-minerals/folic acid (ADULT MULTIVITAMIN GUMMIES PO) Take  by mouth.       calcium citrate 200 mg (950 mg) tablet Take 200 mg by mouth two (2) times a day. Review of Symptoms:       General - No history or complaints of unexpected fever or chills  Head/Neck - No history or complaints of headache or dizziness  Cardiac - No history or complaints of chest pain, palpitations, or shortness of breath  Pulmonary - No history or complaints of shortness of breath or productive cough  Gastrointestinal - as noted above  Genitourinary - No history or complaints of hematuria/dysuria or renal lithiasis  Musculoskeletal - No history or complaints of joint  muscular weakness  Hematologic - No history of any bleeding episodes  Neurologic - No history or complaints of  migraine headaches or neurologic symptoms                     Objective: There were no vitals taken for this visit. Physical Exam:      Physical Examination: General appearance - alert, well appearing, and in no distress and oriented to person, place, and time  Mental status - alert, oriented to person, place, and time, normal mood, behavior, speech, dress, motor activity, and thought processes  Eyes - pupils equal and reactive, extraocular eye movements intact, sclera anicteric, left eye normal, right eye normal  Ears - right ear normal, left ear normal  Neck- good extension and flexion, no obvious swelling  Chest - good air movement  Heart - N/A  Abdomen - no obvious distension.   Neurological - alert, oriented, normal speech, no focal findings or movement disorder noted  Musculoskeletal - no swelling noted  Extremities - normal movement   Lab Results   Component Value Date/Time    WBC 5.1 02/01/2020 06:30 PM    HGB 10.2 (L) 02/01/2020 06:30 PM    HCT 32.7 (L) 02/01/2020 06:30 PM    PLATELET 365 03/00/7794 06:30 PM    MCV 93.4 02/01/2020 06:30 PM     Lab Results   Component Value Date/Time    Sodium 141 02/01/2020 06:30 PM    Potassium 4.2 02/01/2020 06:30 PM    Chloride 109 02/01/2020 06:30 PM    CO2 30 02/01/2020 06:30 PM    Anion gap 2 (L) 02/01/2020 06:30 PM    Glucose 87 2020 06:30 PM    BUN 9 2020 06:30 PM    Creatinine 0.52 (L) 2020 06:30 PM    BUN/Creatinine ratio 17 2020 06:30 PM    GFR est AA >60 2020 06:30 PM    GFR est non-AA >60 2020 06:30 PM    Calcium 8.5 2020 06:30 PM    Bilirubin, total 0.3 2020 06:30 PM    AST (SGOT) 25 2020 06:30 PM    Alk. phosphatase 86 2020 06:30 PM    Protein, total 7.0 2020 06:30 PM    Albumin 3.3 (L) 2020 06:30 PM    Globulin 3.7 2020 06:30 PM    A-G Ratio 0.9 2020 06:30 PM    ALT (SGPT) 22 2020 06:30 PM     Lab Results   Component Value Date/Time    Iron 49 (L) 2020 12:19 PM    Ferritin 174 2020 12:19 PM     Lab Results   Component Value Date/Time    Folate 15.7 2020 12:19 PM     Lab Results   Component Value Date/Time    Vitamin D 25-Hydroxy 17.8 (L) 2020 12:20 PM           Assessment:     1. History of Morbid obesity, status post  laparoscopic gastric bypass surgery. Doing well, no concerns. She is now back to normal.   It is certainly interesting that the patient developed constipation after her colectomy as this is somewhat unusual.  In any case the Pilar Sanes has cured her problem and she is having no further issues. She is eating a regular diet, exercising normally and having no abdominal pain at all. She is having some mild issue with surgical scars in her lower abdominal region. She has a very unusual configuration of scars with the joining of her lower midline incision from her  creating an overhanging affect with subsequent rash. I explained to her that a year from now she can seek out the attention of a plastic surgeon to see if he can take care of that issue for us. Secondly the patient did have a somewhat low serum iron level and low hemoglobin. We will repeat her lab work in 2 months to see how she is doing from that standpoint. Plan:     1.  Remember to measure portions, continue low carbohydrate diet  2. Continue to concentrate on protein intake meeting daily requirements  3. Remember vitamin supplements. The importance of such was discussed regarding the malabsorptive issues that the surgery creates. 4. Exercise regimen appears to be:good  5. Try and attend support group if feasible. 6. Follow-up in 1 year(s). 7. Lab reviewed   8. Total time spent with the patient 30 minutes. This visit with Sanjiv Rascon was performed under virtual telemedicine guidelines during the coronavirus (RZPKY-40) public health emergency on 4/29/2020 in an interactive   fashion using [employer]. They understand that this telemedicine encounter is a billable service, with coverage determined by their insurance carrier. They are aware that   they may receive a bill and have provided verbal consent for this virtual visit. This visit was performed with the patient in their home environment and provider was   present at [facility]. I have spent over 30 minutes on this visit  both prior to the visits reviewing the patients chart and with the patient face to face. I have reviewed their medical history, performed a telemedicine physical examination, and discussed the plan of action to date. They understand that they will be asked   to come to the office when our office is allowed normal patient interaction, as dictated by public health officials, for a face-to-face visit to rediscuss all of the things we  have talked about today.      Cecelia Collier M.D.  4/29/2020

## 2020-04-29 NOTE — PATIENT INSTRUCTIONS

## 2020-05-08 ENCOUNTER — TELEPHONE (OUTPATIENT)
Dept: SURGERY | Age: 40
End: 2020-05-08

## 2020-05-08 NOTE — TELEPHONE ENCOUNTER
Pt called and wanted a return to work note full duty. I spoke with Dr Daphne Jensen and he said okay. Pt wanted it faxed to [employer] and she will come to  a copy today.

## 2020-10-15 DIAGNOSIS — Z98.84 S/P GASTRIC BYPASS: ICD-10-CM

## 2020-10-15 DIAGNOSIS — D64.9 ANEMIA, UNSPECIFIED TYPE: Primary | ICD-10-CM

## 2020-10-15 DIAGNOSIS — K90.9 INTESTINAL MALABSORPTION, UNSPECIFIED TYPE: ICD-10-CM

## 2020-10-15 DIAGNOSIS — D64.9 ANEMIA, UNSPECIFIED TYPE: ICD-10-CM

## 2021-01-07 NOTE — PROGRESS NOTES
Subjective:      Luis Dukes is a 38 y.o. female is now 3 years status post laparoscopic gastric bypass surgery. Doing well overall. She has lost a total of 88 pounds since surgery. Body mass index is 29.57 kg/m². Has lost 77% of EBW. March 2008 - uncomplicated gastric bypass by Dr. Sanjeev Barton  Oct 2018 - dx lap'scope for abd pain / lysis of adhesions (benign findings)  Jan 2019 - dx lap'scope for abd pain / lysis of adhesions (benign findings) by Dr. Mykel Bedolla at [facility]  April 2019 - dx lap'scope repair of internal hernia  Jan 2020 - dx lap'scope to open laparotomy for ileocecal intussusception, resection of ascending colon and portion of small bowel. Currently on a solid food diet without difficulty, reports no issues and denies vomiting and abdominal pain. Taking in 60oz water daily. Sources of protein include protein shake for breakfast, chicken, fish, eggs, cheese and nuts. 30 min of activity 4 days a week, including walking, treadmill. Patient is sleeping 5-6 hours a night on average. She recently bought melatonin. She is currently in treatment for migraines. Bowel movements are regular, but malodorous. She has tried an elimination diet with no success. The patient is not having any pain. . The patient is compliant with multivitamins, calcium, Vit D and B12 supplements.      Weight Loss Metrics 1/8/2021 4/29/2020 3/12/2020 3/4/2020 2/12/2020 2/1/2020 1/27/2020   Today's Wt 141 lb 8 oz 128 lb 126 lb 1.6 oz 127 lb 127 lb 132 lb 130 lb 8 oz   BMI 29.57 kg/m2 26.75 kg/m2 26.35 kg/m2 26.54 kg/m2 26.54 kg/m2 27.59 kg/m2 27.27 kg/m2       Comorbidities:    Hypertension: not applicable  Diabetes: not applicable  Obstructive Sleep Apnea: not applicable  Hyperlipidemia: not applicable  Stress Urinary Incontinence: not applicable  Gastroesophageal Reflux: resolved  Weight related arthropathy: resolved     Patient Active Problem List   Diagnosis Code    History of colon polyps Z86.010    Intestinal malabsorption K90.9    S/P gastric bypass Z98.84    Iron deficiency E61.1    Pica F50.89    Intertriginous candidiasis B37.2    History of intussusception Z87.19        Past Medical History:   Diagnosis Date    Constipation     Functional dyspepsia     uses OTC meds    GERD (gastroesophageal reflux disease)     History of colon polyps     History of intussusception 2020    1/15/2020 Diagnostic laparoscopy changed to open laparotomy by Dr. Ashley Guy Distal small bowel resection with Ileocecal resection with anastomosis.  Morbid obesity ([address]     Morbid obesity with body mass index (BMI) of 40.0 to 49.9 (HCC)     Nausea & vomiting 2018    SBO (small bowel obstruction) (HCC)        Past Surgical History:   Procedure Laterality Date    ABDOMEN SURGERY PROC UNLISTED      adhensions on liver    COLONOSCOPY      X 3    DELIVERY       X 3    HX  SECTION      HX COLECTOMY      HX COLECTOMY  2020    by Dr. Luma Osei      HX GASTRIC BYPASS      HX GI      gastric bypass    HX OTHER SURGICAL      scar resection back of head    HX TUBAL LIGATION      HX UROLOGICAL  2001    stent    IR URETERAL STENT PLACEMENT         Current Outpatient Medications   Medication Sig Dispense Refill    PNV Comb #2-Iron-FA-Omega 3 29-1-400 mg cmpk Take  by mouth.  cyanocobalamin (Vitamin B-12) 1,000 mcg/mL injection 1,000 mcg by IntraMUSCular route every thirty (30) days.  simethicone (Gas-X) 125 mg capsule Take 125 mg by mouth four (4) times daily as needed for Flatulence.  omeprazole (PRILOSEC) 20 mg capsule Take 20 mg by mouth daily.  ergocalciferol (ERGOCALCIFEROL) 1,250 mcg (50,000 unit) capsule Take 1 cap by mouth twice weekly for 12 months 52 Cap 1    multivit-minerals/folic acid (ADULT MULTIVITAMIN GUMMIES PO) Take  by mouth.          Allergies   Allergen Reactions    Niacin Other (comments)    Morphine Hives and Itching       Review of Systems:  General - No history or complaints of unexpected fever or chills  Head/Neck - No history or complaints of headache or dizziness  Cardiac - No history or complaints of chest pain, palpitations, or shortness of breath  Pulmonary - No history or complaints of shortness of breath or productive cough  Gastrointestinal - as noted above  Genitourinary - No history or complaints of hematuria/dysuria or renal lithiasis  Musculoskeletal - No history or complaints of joint  muscular weakness  Hematologic - No history of any bleeding episodes  Neurologic - No history or complaints of  migraine headaches or neurologic symptoms    Objective:     Visit Vitals  /70   Pulse 66   Resp 16   Ht 4' 10\" (1.473 m)   Wt 64.2 kg (141 lb 8 oz)   SpO2 100%   BMI 29.57 kg/m²       General:  alert, cooperative, no distress, appears stated age   Chest: lungs clear to auscultation, breath sounds equal and symmetric, no rhonchi, rales or wheezes, no accessory muscle use   Cor:   Regular rate and rhythm or without murmur or extra heart sounds   Abdomen:  erythematous eruption in skin folds, soft, bowel sounds active, non-tender, no masses or organomegaly   Incisions:   healing well, no drainage, no erythema, no hernia, no seroma, no swelling, no dehiscence, incision well approximated           Assessment and Plan   1. Intestinal malabsorption  - Continue required Vitamins: B12, B complex, D, iron, calcium, multivitamin  2. S/p laparoscopic bariatric surgery, GASTRIC BYPASS, history of morbid obesity  - Sleep goal is 7-9 hours each night. Patient education given on the effects of sleep deprivation on weight control.  - Discussed patients weight loss goals and dietary choices in relation to goals. - Reminded to measure portions, continue high protein, low carbohydrate diet. Reminded to eat regularly, to eat slowly & not to drink with meals.    - Continue cardio exercise and add resistance exercises.  60-90 minutes of aerobic activity 5 days a week and strength training 2 days each week.  - Encouraged to attend support group   - Required fluid intake is >64oz daily of sugar free beverages.   3.  Intertriginous candidiasis   - nystatin cream to area   - good candidate for skin removal surgery  4. Malodorous stool   - recommend trying elimination diet again   - probiotic   - if diarrhea returns will try flagyl with cholestyramine which may help with the odor, but treatment is not indicated at this time    Labs ordered today  Follow up in 6 months or sooner if patient has questions, concerns or worsening of condition.  If unable to reach our office and receive immediate care, patient should go to the Emergency Department immediately.  Ms. King has a reminder for a \"due or due soon\" health maintenance. I have asked that she contact her primary care provider for a follow-up on this health maintenance.

## 2021-01-08 ENCOUNTER — HOSPITAL ENCOUNTER (OUTPATIENT)
Dept: LAB | Age: 41
Discharge: HOME | End: 2021-01-08
Payer: OTHER GOVERNMENT

## 2021-01-08 ENCOUNTER — OFFICE VISIT (OUTPATIENT)
Dept: SURGERY | Age: 41
End: 2021-01-08
Payer: OTHER GOVERNMENT

## 2021-01-08 VITALS
HEIGHT: 58 IN | DIASTOLIC BLOOD PRESSURE: 70 MMHG | HEART RATE: 66 BPM | SYSTOLIC BLOOD PRESSURE: 109 MMHG | WEIGHT: 141.5 LBS | BODY MASS INDEX: 29.7 KG/M2 | RESPIRATION RATE: 16 BRPM | OXYGEN SATURATION: 100 %

## 2021-01-08 DIAGNOSIS — B37.2 INTERTRIGINOUS CANDIDIASIS: ICD-10-CM

## 2021-01-08 DIAGNOSIS — K90.89 OTHER SPECIFIED INTESTINAL MALABSORPTION: Primary | ICD-10-CM

## 2021-01-08 DIAGNOSIS — Z98.84 S/P GASTRIC BYPASS: ICD-10-CM

## 2021-01-08 LAB
25(OH)D3 SERPL-MCNC: 38.3 NG/ML (ref 30–100)
ALBUMIN SERPL-MCNC: 4 G/DL (ref 3.4–5)
ALBUMIN/GLOB SERPL: 1 {RATIO} (ref 0.8–1.7)
ALP SERPL-CCNC: 118 U/L (ref 45–117)
ALT SERPL-CCNC: 34 U/L (ref 13–56)
ANION GAP SERPL CALC-SCNC: 7 MMOL/L (ref 3–18)
AST SERPL-CCNC: 26 U/L (ref 10–38)
BASOPHILS # BLD: 0 K/UL (ref 0–0.1)
BASOPHILS NFR BLD: 0 % (ref 0–2)
BILIRUB SERPL-MCNC: 0.5 MG/DL (ref 0.2–1)
BUN SERPL-MCNC: 12 MG/DL (ref 7–18)
BUN/CREAT SERPL: 18 (ref 12–20)
CALCIUM SERPL-MCNC: 9.7 MG/DL (ref 8.5–10.1)
CHLORIDE SERPL-SCNC: 109 MMOL/L (ref 100–111)
CO2 SERPL-SCNC: 25 MMOL/L (ref 21–32)
CREAT SERPL-MCNC: 0.67 MG/DL (ref 0.6–1.3)
DIFFERENTIAL METHOD BLD: ABNORMAL
EOSINOPHIL # BLD: 0.1 K/UL (ref 0–0.4)
EOSINOPHIL NFR BLD: 2 % (ref 0–5)
ERYTHROCYTE [DISTWIDTH] IN BLOOD BY AUTOMATED COUNT: 13.1 % (ref 11.6–14.5)
FERRITIN SERPL-MCNC: 8 NG/ML (ref 8–388)
FOLATE SERPL-MCNC: >20 NG/ML (ref 3.1–17.5)
GLOBULIN SER CALC-MCNC: 3.9 G/DL (ref 2–4)
GLUCOSE SERPL-MCNC: 80 MG/DL (ref 74–99)
HCT VFR BLD AUTO: 39.1 % (ref 35–45)
HGB BLD-MCNC: 12.8 G/DL (ref 12–16)
IRON SERPL-MCNC: 100 UG/DL (ref 50–175)
LYMPHOCYTES # BLD: 1.4 K/UL (ref 0.9–3.6)
LYMPHOCYTES NFR BLD: 32 % (ref 21–52)
MCH RBC QN AUTO: 30.6 PG (ref 24–34)
MCHC RBC AUTO-ENTMCNC: 32.7 G/DL (ref 31–37)
MCV RBC AUTO: 93.5 FL (ref 74–97)
MONOCYTES # BLD: 0.3 K/UL (ref 0.05–1.2)
MONOCYTES NFR BLD: 7 % (ref 3–10)
NEUTS SEG # BLD: 2.6 K/UL (ref 1.8–8)
NEUTS SEG NFR BLD: 59 % (ref 40–73)
PLATELET # BLD AUTO: 194 K/UL (ref 135–420)
PMV BLD AUTO: 10.1 FL (ref 9.2–11.8)
POTASSIUM SERPL-SCNC: 4.2 MMOL/L (ref 3.5–5.5)
PROT SERPL-MCNC: 7.9 G/DL (ref 6.4–8.2)
RBC # BLD AUTO: 4.18 M/UL (ref 4.2–5.3)
SODIUM SERPL-SCNC: 141 MMOL/L (ref 136–145)
VIT B12 SERPL-MCNC: 305 PG/ML (ref 211–911)
WBC # BLD AUTO: 4.4 K/UL (ref 4.6–13.2)

## 2021-01-08 PROCEDURE — 85025 COMPLETE CBC W/AUTO DIFF WBC: CPT

## 2021-01-08 PROCEDURE — 82306 VITAMIN D 25 HYDROXY: CPT

## 2021-01-08 PROCEDURE — 36415 COLL VENOUS BLD VENIPUNCTURE: CPT

## 2021-01-08 PROCEDURE — 84425 ASSAY OF VITAMIN B-1: CPT

## 2021-01-08 PROCEDURE — 83540 ASSAY OF IRON: CPT

## 2021-01-08 PROCEDURE — 82607 VITAMIN B-12: CPT

## 2021-01-08 PROCEDURE — 82728 ASSAY OF FERRITIN: CPT

## 2021-01-08 PROCEDURE — 99214 OFFICE O/P EST MOD 30 MIN: CPT | Performed by: PHYSICIAN ASSISTANT

## 2021-01-08 PROCEDURE — 80053 COMPREHEN METABOLIC PANEL: CPT

## 2021-01-08 RX ORDER — NYSTATIN 100000 U/G
CREAM TOPICAL 2 TIMES DAILY
Qty: 30 G | Refills: 5 | Status: SHIPPED | OUTPATIENT
Start: 2021-01-08

## 2021-01-08 RX ORDER — GABAPENTIN 100 MG/1
100 CAPSULE ORAL 3 TIMES DAILY
COMMUNITY

## 2021-01-08 RX ORDER — SIMETHICONE 125 MG
125 CAPSULE ORAL
COMMUNITY

## 2021-01-08 RX ORDER — CYANOCOBALAMIN 1000 UG/ML
1000 INJECTION, SOLUTION INTRAMUSCULAR; SUBCUTANEOUS
COMMUNITY

## 2021-01-08 NOTE — PATIENT INSTRUCTIONS
Patient Instructions 1. Remember hydration goals - minimum of 64 ounces of liquids per day (dehydration is the number one reason for hospital readmission). 2. Sleep 7-9 hours each night to keep your metabolism up. 3. Continue to monitor carbohydrate and protein intake you need a minimum of  Grams of protein daily- remember to keep your total carbohydrates to 50 grams or less per day for best results. 4. To maximize weight loss keep your caloric intake between 800-1,200 calories daily. If you are exercising excessively, such as training for a marathon, you need to keep a food log and meet with the dietician so they can advise you on your diet choices, carbohydrate intake and caloric intake. 5. Continue to work towards exercise goals - 60-90 minutes, 5 times a week minimum of deliberate, aerobic exercise is the ultimate goal with strength training 2 times each week. Refer to [place] for  information. 6. Remember to take vitamins as directed in your handbook. 7. Attend support group the 2nd Thursday of each month. 8. Constipation: Milk of Magnesia is for immediate relief only. Miralax is to be used every day if constipation is a chronic problem. 9. Diarrhea: patients will occasionally develop lactose intolerance after surgery. Check to see if your protein shake has whey in it. If it does try a protein powder or drink that does not have whey and stop all yogurts, cheeses and milks to see if the diarrhea goes away. 10. If you have had labs drawn. We will only call you if you have abnormal results. Otherwise you can access the lab results in \"mychart\". You will only need the access code the first time you sign on. 11. Call us at [phone] or email us through [email or web address]" with questions,     concerns or worsening of condition, we have someone on call 24 hours a day. If you are unable to reach our office, you are to go to your Primary Care Physician or the Emergency Department. NOTE TO GASTRIC BYPASS PATIENTS:  (SAME APPLIES TO GASTRIC SLEEVE PATIENTS FOR FIRST TWO MONTHS) Remember that for the rest of your life, you are not able to take the following: 
- NSAIDs (ibuprofen, goody powder, BC powder, Motrin, Advil, Mobic, Voltaren, Excedrin, etc.) - Steroid pills or injections - Smoke (cigarettes or recreational drugs) - Alcohol Use of any of the above may cause ulcers in your stomach which may perforate causing a medical emergency and surgery. Speak to our medical staff if another medical provider requires you to take steroids or NSAIDs. Supplement Resource Guide Importance of Protein:  
Maintains lean body mass, produces antibodies to fight off infections, heals wounds, minimizes hair loss, helps to give you energy, helps with satiety, and keeping you full between meals. Importance of Calcium: 
Needed for healthy bones and teeth, normal blood clotting, and nervous system functioning, higher risk of osteoporosis and bone disease with non-compliance. Importance of Multivitamins: Many functions. Supply you with extra nutrients that you may be missing from food. May lead to iron deficiency anemia, weakness, fatigue, and many other symptoms with non-compliance. Importance of B Vitamins: 
Important for red blood cell formation, metabolism, energy, and helps to maintain a healthy nervous system. Protein Supplement Liquid diet phase: consume 90-100g protein daily. Once you are eating consume 35-50g protein each day from your protein supplement. 0-3 g fat per serving 0-3 g sugar per serving The body can only absorb 30g of protein at one time, so do not consume more than that at one time. Multi-vitamin Supplement:   
Start immediately after surgery: any complete chewable, such as: [employer] Anthonys Complete chewables. Avoid [facility] [employer] sours or gummies. They lack iron and other important nutrients and also have added sugar. Continue with a chewable vitamin or change to an adult complete multivitamin one month after surgery. Menstruating women can take a prenatal vitamin. Make sure it has at least 18 mg iron and 825-251 mcg folic acid Calcium Supplement:  
 
Start taking within one month after surgery. Look for:  
Calcium Citrate Plus D (1500 mg per day) Recommend: Citracal 
 
Avoid chocolate chewable calcium. Can use chewable bariatric or [employer] brand or similar chewable. The body cannot absorb more than 500-600 mg of calcium at one time. Take for Life Vitamin D Take 3,000 international units daily Vitamin B12 B Complex Vitamin Start taking both within one month after surgery. Vitamin B12 (sublingual): Take 1000 mcg of Vitamin B12 three times weekly Must take sublingually (meaning you put it under your tongue) or in a liquid drop form for easy absorption. B Complex Vitamin:  
Take one pill daily or liquid drop form daily; as directed on bottle. Take for Life PerQue Digesta Guard Forte (probiotic)

## 2021-01-14 LAB — VIT B1 BLD-SCNC: 87.6 NMOL/L (ref 66.5–200)

## 2021-10-29 NOTE — PROGRESS NOTES
Gama  please advise:    Patient is requesting results of recent labs.   Thank you INTERVAL HPI/OVERNIGHT EVENTS: PRAVEEN O/N    SUBJECTIVE: Patient seen and examined at bedside.   Pt reports pain in hip feels better vs yesterday. No numbness. Feels throat is very dry but tolerated breakfast wo nausea, abd pain. +Flatus wo BM. Holloway remains in place. No fever, chest pain, dyspnea, dizziness, or lightheadedness.     OBJECTIVE:    VITAL SIGNS:  ICU Vital Signs Last 24 Hrs  T(C): 36.9 (29 Oct 2021 08:31), Max: 36.9 (29 Oct 2021 08:31)  T(F): 98.5 (29 Oct 2021 08:31), Max: 98.5 (29 Oct 2021 08:31)  HR: 102 (29 Oct 2021 08:31) (78 - 102)  BP: 134/83 (29 Oct 2021 08:31) (119/57 - 156/81)  BP(mean): 91 (29 Oct 2021 06:36) (87 - 95)  ABP: --  ABP(mean): --  RR: 17 (29 Oct 2021 08:31) (12 - 17)  SpO2: 94% (29 Oct 2021 08:31) (94% - 99%)        10-28 @ 07:01  -  10-29 @ 07:00  --------------------------------------------------------  IN: 0 mL / OUT: 900 mL / NET: -900 mL      CAPILLARY BLOOD GLUCOSE          PHYSICAL EXAM:  GEN: female in NAD on RA  HEENT: NC/AT, MMM  NECK: Supple  CV: RRR, nml S1S2, no murmurs  PULM: nml effort, CTAB wo rales, rhonchi, or weakness  ABD: Soft, non-distended, NABS, non-tender  NEURO  A/O x3, moving all extremities.   5/5 in BUE.  L lateral thigh dressing c/d/i. Decreased strength in L hip and knee 2/2 pain. L plantarflex/ext 5/5.   PSYCH: Appropriate    MEDICATIONS:  MEDICATIONS  (STANDING):  aspirin enteric coated 325 milliGRAM(s) Oral two times a day  atorvastatin 10 milliGRAM(s) Oral at bedtime  ceFAZolin   IVPB 2000 milliGRAM(s) IV Intermittent every 8 hours  chlorhexidine 2% Cloths 1 Application(s) Topical once  famotidine    Tablet 20 milliGRAM(s) Oral every 12 hours  influenza  Vaccine (HIGH DOSE) 0.7 milliLiter(s) IntraMuscular once  lactated ringers. 1000 milliLiter(s) (100 mL/Hr) IV Continuous <Continuous>  povidone iodine 5% Nasal Swab 1 Application(s) Both Nostrils once  selegiline Oral Tab/Cap 10 milliGRAM(s) Oral every 12 hours  senna 2 Tablet(s) Oral at bedtime    MEDICATIONS  (PRN):  acetaminophen     Tablet .. 975 milliGRAM(s) Oral every 8 hours PRN Mild Pain (1 - 3)  aluminum hydroxide/magnesium hydroxide/simethicone Suspension 30 milliLiter(s) Oral four times a day PRN Indigestion  HYDROmorphone  Injectable 0.5 milliGRAM(s) IV Push every 4 hours PRN breakthrough pain  ondansetron Injectable 4 milliGRAM(s) IV Push every 6 hours PRN Nausea and/or Vomiting  oxyCODONE    IR 10 milliGRAM(s) Oral every 4 hours PRN Severe Pain (7 - 10)  oxyCODONE    IR 5 milliGRAM(s) Oral every 4 hours PRN Moderate Pain (4 - 6)      ALLERGIES:  Allergies    penicillin (Swelling)  sulfa drugs (Nausea)    Intolerances        LABS:                        11.0   9.73  )-----------( 165      ( 29 Oct 2021 06:29 )             35.1     10    138  |  106  |  31<H>  ----------------------------<  115<H>  4.6   |  23  |  1.62<H>    Ca    8.7      29 Oct 2021 06:29      PT/INR - ( 27 Oct 2021 21:21 )   PT: 13.2 sec;   INR: 1.11          PTT - ( 27 Oct 2021 21:21 )  PTT:26.8 sec  Urinalysis Basic - ( 28 Oct 2021 10:42 )    Color: Yellow / Appearance: Clear / S.020 / pH: x  Gluc: x / Ketone: Trace mg/dL  / Bili: Negative / Urobili: 0.2 E.U./dL   Blood: x / Protein: NEGATIVE mg/dL / Nitrite: NEGATIVE   Leuk Esterase: NEGATIVE / RBC: > 10 /HPF / WBC < 5 /HPF   Sq Epi: x / Non Sq Epi: 0-5 /HPF / Bacteria: Present /HPF        RADIOLOGY & ADDITIONAL TESTS: Reviewed.

## 2022-03-18 PROBLEM — K90.9 INTESTINAL MALABSORPTION: Status: ACTIVE | Noted: 2018-04-19

## 2022-03-18 PROBLEM — Z98.84 S/P GASTRIC BYPASS: Status: ACTIVE | Noted: 2018-04-19

## 2022-03-18 PROBLEM — Z87.19 HISTORY OF INTUSSUSCEPTION: Status: ACTIVE | Noted: 2020-01-21

## 2022-03-19 PROBLEM — B37.2 INTERTRIGINOUS CANDIDIASIS: Status: ACTIVE | Noted: 2018-10-16

## 2022-03-19 PROBLEM — E61.1 IRON DEFICIENCY: Status: ACTIVE | Noted: 2018-10-16

## 2022-03-19 PROBLEM — F50.89 PICA: Status: ACTIVE | Noted: 2018-10-16

## 2022-03-22 ENCOUNTER — DOCUMENTATION ONLY (OUTPATIENT)
Dept: SURGERY | Age: 42
End: 2022-03-22

## 2022-03-22 NOTE — PROGRESS NOTES
Per [facility] requirements;  E-mail and letter sent for follow up appointment. [facility]  [address]   Chely Goyal, [address] [facility] [employer] [facility] [place]  [facility]  [facility]      Dear Patient,    Your health is our main concern. It is important for your health to have follow-up lab work and to see your surgeon at 2 months, 4 months, 6 months, 9 months and annually after your weight loss surgery. Additionally, the Department of Bariatric Surgery at our hospital is a member of the Metabolic and Bariatric Surgery Accreditation and Quality Improvement Program [facility]). As a participant in this program, we gather information on the outcomes of our patients after surgery. Please call the office for a follow up appointment at [phone]. If you have moved out of the area or have changed surgeons please call us and let us know the name of your doctor. Your health and feedback are important to us. We greatly appreciate your response.        Thank you,  [facility] [address] [facility]

## 2022-04-22 NOTE — DISCHARGE INSTRUCTIONS
Ashwini Khaners  [id number]  1980    EGD DISCHARGE INSTRUCTIONS    Discomfort:  Sore throat- throat lozenges or warm salt water gargle  redness at IV site- apply warm compress to area; if redness or soreness persist- contact your physician  Gaseous discomfort- walking, belching will help relieve any discomfort  You should not operate a vehicle for 12 hours  You should note engage in an occupation involving machinery or appliances for rest of today  You may note drink alcoholic beverages for at least 12 hours  Avoid making any critical decisions for at least 24 hour  DIET  You may resume your regular diet - however -  remember your colon is empty and a heavy meal will produce gas. Avoid these foods:  vegetables, fried / greasy foods, carbonated drinks    ACTIVITY  You may resume your normal daily activities   Spend the remainder of the day resting -  avoid any strenuous activity. CALL M.D. ANY SIGN OF   Increasing pain, nausea, vomiting  Abdominal distension (swelling)  New increased bleeding (oral or rectal)  Fever (chills)  Pain in chest area  Bloody discharge from nose or mouth  Shortness of breath       Follow-up Instructions:   Dr Lorenza Coronado will call you in one to two weeks. If you do not hear from him, please call his office [phone]. Ashwini Rodriguez  [id number]  1980        DISCHARGE SUMMARY from Nurse    PATIENT INSTRUCTIONS:    After general anesthesia or intravenous sedation, for 24 hours or while taking prescription Narcotics:  · Limit your activities  · Do not drive and operate hazardous machinery  · Do not make important personal or business decisions  · Do  not drink alcoholic beverages  · If you have not urinated within 8 hours after discharge, please contact your surgeon on call.     Report the following to your surgeon:  · Excessive pain, swelling, redness or odor of or around the surgical area  · Temperature over 100.5  · Nausea and vomiting lasting longer than 4 hours norco sent or if unable to take medications  · Any signs of decreased circulation or nerve impairment to extremity: change in color, persistent  numbness, tingling, coldness or increase pain  · Any questions    What to do at Home:  Recommended activity: Activity as tolerated and no driving for today and Ambulate in house. If you experience any of the following symptoms as listed above, please follow up with Dr. Shira King. *  Please give a list of your current medications to your Primary Care Provider. *  Please update this list whenever your medications are discontinued, doses are      changed, or new medications (including over-the-counter products) are added. *  Please carry medication information at all times in case of emergency situations. These are general instructions for a healthy lifestyle:    No smoking/ No tobacco products/ Avoid exposure to second hand smoke  Surgeon General's Warning:  Quitting smoking now greatly reduces serious risk to your health. Obesity, smoking, and sedentary lifestyle greatly increases your risk for illness    A healthy diet, regular physical exercise & weight monitoring are important for maintaining a healthy lifestyle    You may be retaining fluid if you have a history of heart failure or if you experience any of the following symptoms:  Weight gain of 3 pounds or more overnight or 5 pounds in a week, increased swelling in our hands or feet or shortness of breath while lying flat in bed. Please call your doctor as soon as you notice any of these symptoms; do not wait until your next office visit. Recognize signs and symptoms of STROKE:    F-face looks uneven    A-arms unable to move or move unevenly    S-speech slurred or non-existent    T-time-call 911 as soon as signs and symptoms begin-DO NOT go       Back to bed or wait to see if you get better-TIME IS BRAIN. Warning Signs of HEART ATTACK     Call 911 if you have these symptoms:   Chest discomfort.  Most heart attacks involve discomfort in the center of the chest that lasts more than a few minutes, or that goes away and comes back. It can feel like uncomfortable pressure, squeezing, fullness, or pain.  Discomfort in other areas of the upper body. Symptoms can include pain or discomfort in one or both arms, the back, neck, jaw, or stomach.  Shortness of breath with or without chest discomfort.  Other signs may include breaking out in a cold sweat, nausea, or lightheadedness. Don't wait more than five minutes to call 911 - MINUTES MATTER! Fast action can save your life. Calling 911 is almost always the fastest way to get lifesaving treatment. Emergency Medical Services staff can begin treatment when they arrive -- up to an hour sooner than if someone gets to the hospital by car. The discharge information has been reviewed with the patient and caregiver. The patient and caregiver verbalized understanding. Discharge medications reviewed with the patient and caregiver and appropriate educational materials and side effects teaching were provided. Patient armband removed and shredded.     ___________________________________________________________________________________________________________________________________

## 2022-06-01 NOTE — PERIOP NOTES
Called to confirm procedure scheduled for Friday, June 3rd 2022. No answer, so left voicemail. Patient called back to confirm appointment with arrival time of 1100. Also directed patient on where to go to register for procedure.

## 2022-06-03 ENCOUNTER — ANESTHESIA (OUTPATIENT)
Dept: ENDOSCOPY | Age: 42
End: 2022-06-03
Payer: OTHER GOVERNMENT

## 2022-06-03 ENCOUNTER — HOSPITAL ENCOUNTER (OUTPATIENT)
Age: 42
Setting detail: OUTPATIENT SURGERY
Discharge: HOME | End: 2022-06-03
Attending: INTERNAL MEDICINE | Admitting: INTERNAL MEDICINE
Payer: OTHER GOVERNMENT

## 2022-06-03 ENCOUNTER — ANESTHESIA EVENT (OUTPATIENT)
Dept: ENDOSCOPY | Age: 42
End: 2022-06-03
Payer: OTHER GOVERNMENT

## 2022-06-03 VITALS
BODY MASS INDEX: 29.56 KG/M2 | DIASTOLIC BLOOD PRESSURE: 70 MMHG | HEART RATE: 58 BPM | RESPIRATION RATE: 16 BRPM | SYSTOLIC BLOOD PRESSURE: 116 MMHG | OXYGEN SATURATION: 100 % | TEMPERATURE: 98.1 F | HEIGHT: 58 IN | WEIGHT: 140.8 LBS

## 2022-06-03 LAB — HCG UR QL: NEGATIVE

## 2022-06-03 PROCEDURE — 81025 URINE PREGNANCY TEST: CPT

## 2022-06-03 PROCEDURE — 76060000031 HC ANESTHESIA FIRST 0.5 HR: Performed by: INTERNAL MEDICINE

## 2022-06-03 PROCEDURE — 76040000019: Performed by: INTERNAL MEDICINE

## 2022-06-03 PROCEDURE — 77030021593 HC FCPS BIOP ENDOSC BSC -A: Performed by: INTERNAL MEDICINE

## 2022-06-03 PROCEDURE — 74011250636 HC RX REV CODE- 250/636: Performed by: NURSE ANESTHETIST, CERTIFIED REGISTERED

## 2022-06-03 PROCEDURE — 2709999900 HC NON-CHARGEABLE SUPPLY: Performed by: INTERNAL MEDICINE

## 2022-06-03 PROCEDURE — 88305 TISSUE EXAM BY PATHOLOGIST: CPT

## 2022-06-03 PROCEDURE — 74011250636 HC RX REV CODE- 250/636: Performed by: INTERNAL MEDICINE

## 2022-06-03 RX ORDER — FLUMAZENIL 0.1 MG/ML
0.2 INJECTION INTRAVENOUS
Status: CANCELLED | OUTPATIENT
Start: 2022-06-03 | End: 2022-06-03

## 2022-06-03 RX ORDER — SODIUM CHLORIDE 0.9 % (FLUSH) 0.9 %
5-40 SYRINGE (ML) INJECTION AS NEEDED
Status: CANCELLED | OUTPATIENT
Start: 2022-06-03

## 2022-06-03 RX ORDER — DEXTROMETHORPHAN/PSEUDOEPHED 2.5-7.5/.8
1.2 DROPS ORAL
Status: CANCELLED | OUTPATIENT
Start: 2022-06-03

## 2022-06-03 RX ORDER — NALOXONE HYDROCHLORIDE 0.4 MG/ML
0.4 INJECTION, SOLUTION INTRAMUSCULAR; INTRAVENOUS; SUBCUTANEOUS
Status: CANCELLED | OUTPATIENT
Start: 2022-06-03 | End: 2022-06-03

## 2022-06-03 RX ORDER — EPINEPHRINE 0.1 MG/ML
1 INJECTION INTRACARDIAC; INTRAVENOUS
Status: CANCELLED | OUTPATIENT
Start: 2022-06-03 | End: 2022-06-04

## 2022-06-03 RX ORDER — ATROPINE SULFATE 0.1 MG/ML
0.5 INJECTION INTRAVENOUS
Status: CANCELLED | OUTPATIENT
Start: 2022-06-03 | End: 2022-06-04

## 2022-06-03 RX ORDER — SODIUM CHLORIDE 0.9 % (FLUSH) 0.9 %
5-40 SYRINGE (ML) INJECTION EVERY 8 HOURS
Status: CANCELLED | OUTPATIENT
Start: 2022-06-03

## 2022-06-03 RX ORDER — SODIUM CHLORIDE 9 MG/ML
125 INJECTION, SOLUTION INTRAVENOUS CONTINUOUS
Status: DISCONTINUED | OUTPATIENT
Start: 2022-06-03 | End: 2022-06-03 | Stop reason: HOSPADM

## 2022-06-03 RX ORDER — PROPOFOL 10 MG/ML
INJECTION, EMULSION INTRAVENOUS AS NEEDED
Status: DISCONTINUED | OUTPATIENT
Start: 2022-06-03 | End: 2022-06-03 | Stop reason: HOSPADM

## 2022-06-03 RX ADMIN — PROPOFOL 50 MG: 10 INJECTION, EMULSION INTRAVENOUS at 13:56

## 2022-06-03 RX ADMIN — PROPOFOL 50 MG: 10 INJECTION, EMULSION INTRAVENOUS at 13:50

## 2022-06-03 RX ADMIN — PROPOFOL 50 MG: 10 INJECTION, EMULSION INTRAVENOUS at 14:04

## 2022-06-03 RX ADMIN — SODIUM CHLORIDE 125 ML/HR: 9 INJECTION, SOLUTION INTRAVENOUS at 12:11

## 2022-06-03 RX ADMIN — PROPOFOL 50 MG: 10 INJECTION, EMULSION INTRAVENOUS at 13:53

## 2022-06-03 RX ADMIN — PROPOFOL 50 MG: 10 INJECTION, EMULSION INTRAVENOUS at 14:00

## 2022-06-03 NOTE — ANESTHESIA PREPROCEDURE EVALUATION
Relevant Problems   NEUROLOGY   (+) Pica       Anesthetic History   No history of anesthetic complications  PONV          Review of Systems / Medical History  Patient summary reviewed, nursing notes reviewed and pertinent labs reviewed    Pulmonary  Within defined limits            Pertinent negatives: No sleep apnea and smoker     Neuro/Psych   Within defined limits           Cardiovascular  Within defined limits                Exercise tolerance: >4 METS     GI/Hepatic/Renal     GERD: well controlled        Pertinent negatives: No hiatal hernia   Endo/Other        Arthritis  Pertinent negatives: No morbid obesity   Other Findings              Physical Exam    Airway  Mallampati: II  TM Distance: > 6 cm  Neck ROM: normal range of motion   Mouth opening: Normal     Cardiovascular    Rhythm: regular  Rate: normal         Dental         Pulmonary  Breath sounds clear to auscultation               Abdominal  GI exam deferred       Other Findings            Anesthetic Plan    ASA: 2  Anesthesia type: MAC          Induction: Intravenous  Anesthetic plan and risks discussed with: Patient

## 2022-06-03 NOTE — H&P
Colonoscopy Procedure Note    Indications: h/o adenomatous polyps    Anesthesia/Sedation: MAC anesthesia Propofol    Pre-Procedure Exam:  Airway: clear   Heart: normal S1and S2    Lungs: clear bilateral  Abdomen: soft, nontender, bowel sounds present and normal in all quadrants   Mental Status: awake, alert, and oriented to person, place, and time      Procedure in Detail:  Informed consent was obtained for the procedure, including sedation. Risks of perforation, hemorrhage, adverse drug reaction, and aspiration were discussed. The patient was placed in the left lateral decubitus position. Based on the pre-procedure assessment, including review of the patient's medical history, medications, allergies, and review of systems, she had been deemed to be an appropriate candidate for moderate sedation; she was therefore sedated with the medications listed above. The patient was monitored continuously with ECG tracing, pulse oximetry, blood pressure monitoring, and direct observations. A rectal examination was performed. The [id number] was inserted into the rectum and advanced under direct vision to the terminal ileum. The quality of the colonic preparation was excellent. A careful inspection was made as the colonoscope was withdrawn, including a retroflexed view of the rectum; findings and interventions are described below. Appropriate photodocumentation was obtained. ANUS: Anal exam reveals no masses or hemorrhoids, sphincter tone is normal.   RECTUM: Rectal exam reveals no masses or hemorrhoids. 5mm polyp removed with cold snare  SIGMOID COLON: The mucosa is normal with good vascular pattern and without ulcers, diverticula, and polyps. DESCENDING COLON: The mucosa is normal with good vascular pattern and without ulcers, diverticula, and polyps. SPLENIC FLEXURE: The splenic flexure is normal.   TRANSVERSE COLON: The mucosa is normal with good vascular pattern and without ulcers, diverticula, and polyps. HEPATIC FLEXURE: The hepatic flexure is normal.   ASCENDING COLON: The mucosa is normal with good vascular pattern and without ulcers, diverticula, and polyps. CECUM: pt with ileocecectomy with side anastomosis   TERMINAL ILEUM: The terminal ileum was entered. Specimens: Specimens were collected and sent to pathology. EBL: None    No prosthetic devices, grafts, tissues, transplant or devices implanted. Withdrawal Time: 10  min    Complications: None; patient tolerated the procedure well. Attending Attestation: I performed the procedure. Recommendations:   - Await pathology.     Signed By: Sabine Boykin MD                      6/3/2022

## 2022-06-03 NOTE — H&P
Gastroenterology [address] [place] [address]  Tel: [phone]  Fax: [phone]    Patient: Antonette Wise   YOB: 1980   Birth Sex: Female      Current Gender: Female      Date:  04/08/2022 9:00 AM    Historian:   self   Visit Type:  Office Visit       Assessment/Plan  # Detail Type Description    1. Assessment Constipation (K59.00). Patient Plan This pleasant 37 y/o lady was referred by MARK Ordaz for screening for colonoscopy. She is also suffering from constipation that started after Adelfo-en-Y bariatric surgery. 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 These records are not available at this time  so this all based on patient's recollection. She continues to have constipation and can have relief with miralax and stool softener but often waits until she feels bloated and gassy before starting laxative. She is encouraged to take daily titrating as needed to achieved desired results. She is also encouraged to maintain hydration needed for good BM. 2. Assessment History of major abdominal surgery (U31.851). Patient Plan Based on Dr. Herman Grewal note from office visit in 2020 she had gastric bypass by Dr Rubin Koyanagi in April 2018. 2 weeks later she had 2 to 3 endoscopy and dilatation for anastomotic stenosis. After that she had 3 other surgeries In Sept 2018 had surgery for small bowel obstruction and in Jan 2019 she had a section of her distal small bowel removed for intussusception. 3. Assessment History of Adelfo-en-Y gastric bypass (Z98.0). Patient Plan Please see above. 4. Assessment Personal history of colonic polyps (Z86.010). Patient Plan Her last colo was in 2018 by Dr. Deni Felipe with finding of 3 polyps and recommendation to return in 5 years.  Pertinent negatives include family history of CRC, personal history of cancer, changes in bowel habits, hematochezia, abdominal pain, n&v and unintentional weight loss. Schedule for COLO with MAC, miralax prep. The risks, benefits, adverse reactions were discussed in detail today with the patient. This includes, but is not limited to, the risk of bleeding, infections, perforation, death, missed lesions, reactions to anesthesia/sedation, other. Patient understands and agrees to undergo the procedure. Visit lasting 20 minutes spent assessing and managing the patient              This 36year old  patient was referred by Wally Membreno. This 36year old female. Problem List  Problem List reviewed. Problem Description Onset Date Chronic Clinical Status Notes   Generalized abdominal pain 2020 N     History of intussusception 2020 N     Pile reducible with difficulty 2020 N     History of Adelfo-en-Y gastric bypass 2019 N     Obstipation 2020 N     History of gastrointestinal tract bypass 2020 N       Past Medical/Surgical History   (Detailed)        Family History   (Detailed)    Relationship Family Member Name  Age at Death Condition Onset Age Cause of Death       Family history of Cancer, prostate  N       Family history of Hypertension  N   Father  N  Alive and well     Mother  N  Alive and well       Social History  (Detailed)  Preferred language is English. Marital Status/Family/Social Support  Marital status:      Tobacco use status: Current non-smoker. Smoking status: Never smoker. Alcohol  There is a history of alcohol use. Type: Wine. 1 glass consumed rarely. Caffeine  The patient does not use caffeine. Medications (active prior to today)  Medication Instructions Start Date Stop Date Refilled Elsewhere   Vitamin B-12  1,000 mcg/mL oral drops Take 1,000 mcg by Mouth Once a Day. //   Y   omeprazole 20 mg tablet,delayed release Take 1 Tab by Mouth Once a Day.  Take 1 tab twice a day for first 5 days, then decrease to 1 tab daily 02/13/2020   Y   ClearLax 17 gram/dose oral powder Take 1 Packet by Mouth Once a Day. 17 GRAMS IN 8 OUNCES OF LIQUID DAILY. 04/07/2019   Y   sucralfate 1 gram tablet Take 1 Tab by Mouth Every 6 hours. 02/13/2020   Y   cyanocobalamin (vit B-12) 1,000 mcg sublingual tablet Take 1,000 mcg by mouth daily. //   Y   Drisdol 1,250 mcg (50,000 unit) capsule Take 1 cap by mouth twice weekly for 12 months 01/27/2020   Y   Linzess 290 mcg capsule take 1 capsule by oral route  every day 30 min on empty stomach before first meal 04/16/2020   N         Medications (Added, Continued or Stopped today)  Start Date Medication Directions PRN Status PRN Reason Instruction Stop Date   04/07/2019 ClearLax 17 gram/dose oral powder Take 1 Packet by Mouth Once a Day. 17 GRAMS IN 8 OUNCES OF LIQUID DAILY. N       cyanocobalamin (vit B-12) 1,000 mcg sublingual tablet Take 1,000 mcg by mouth daily. N      01/27/2020 Drisdol 1,250 mcg (50,000 unit) capsule Take 1 cap by mouth twice weekly for 12 months N      04/16/2020 Linzess 290 mcg capsule take 1 capsule by oral route  every day 30 min on empty stomach before first meal N      02/13/2020 omeprazole 20 mg tablet,delayed release Take 1 Tab by Mouth Once a Day. Take 1 tab twice a day for first 5 days, then decrease to 1 tab daily N      02/13/2020 sucralfate 1 gram tablet Take 1 Tab by Mouth Every 6 hours. N       Vitamin B-12  1,000 mcg/mL oral drops Take 1,000 mcg by Mouth Once a Day. N        Allergies  Ingredient Reaction (Severity) Medication Name Comment   MORPHINE hives (Unknown)     NIACIN mild rash/itching (Unknown)           Physical  Exam  Exam Findings Details   Constitutional Normal No acute distress. Well nourished. Well developed.    Head/Face Normal Facial features - Normal. Eyebrows - Normal.   Eyes Normal General - Right: Normal, Left: Normal. Lids/external - Right: Normal, Left: Normal. Conjunctiva - Right: Normal, Left: Normal. Sclera - Right: Normal, Left: Normal.   Ears Normal Inspection - Right: Normal, Left: Normal.   Nose/Mouth/Throat Normal External nose - Normal. Lips/teeth/gums - Normal.   Nose/Mouth/Throat Normal External nose - Normal. Septum - Normal. Lips/teeth/gums - Normal. Tongue - Normal.   Neck Exam Normal Inspection - Normal. Palpation - Normal. Thyroid gland - Normal. Range of motion - Normal.   Respiratory Normal Inspection - Normal. Auscultation - Normal. Effort - Normal.   Cardiovascular Normal Inspection - JVD: Absent. Palpation/percussion - PMI normal. Heart rate - Regular rate. Rhythm - Regular. Heart sounds - Normal S1, Normal S2. Abdomen Normal Patient is not obese. Inspection - Normal. Appliance(s) - None. Abdominal muscles - Normal. Auscultation - Normal. Percussion - Normal. Anterior palpation - Normal, No guarding, No rebound. CVA tenderness - None. Umbilicus - Normal. No abdominal tenderness. No hepatic enlargement. No spleen enlargement. No hernia. No ascites. No palpable mass. Ureña's sign - Normal. No hepatic tenderness. Genitourinary Normal No CVA Tenderness. No flank mass. Skin * Body areas examined - Scalp/hair, Head/face, Neck, Abdomen. Skin Normal Inspection - Normal. Nails - Normal. Hair - Normal.   Musculoskeletal Normal Overview - Normal. Hands - Left: Normal, Right: Normal.   Neurological Normal Level of consciousness - Normal. Orientation - Normal. Memory - Normal. Cranial nerves - Cranial nerves II through XII grossly intact. Sensory - Normal. Motor - Normal. Balance & gait - Normal. Coordination - Normal.   Psychiatric Normal Orientation - Oriented to time, place, person & situation. Not anxious. Behavior is appropriate for age. Sufficient fund of knowledge. Sufficient language. No memory loss. No mood swings. Normal insight. Normal judgment.    Total time of the encounter was 20 minutes, which includes time spent face-to-face with the patient as well as non-face-to-face time personally spent by the physician and/or other qualified health care professional.      Active Patient Care Team Members  Name Contact Agency Type Support Role Relationship Active Date Inactive Date [facility]   Patient provider PCP      Deisy Andrews   encounter provider    Gastroenterology       Encounter submitted for review by Deisy COLLADO on 04/08/2022 9:58 AM.    Visit details reviewed and approved by supervising provider Majo Potter MD on 04/09/2022. Document generated by: Majo Potter 04/09/2022      ----------------------------------------------------------------------------------------------------------------------------------------------------------------------      Electronically signed by Deisy COLLADO on 04/10/2022 04:24 PM    Pt seen and examined.   NO interval change

## 2022-06-03 NOTE — ANESTHESIA POSTPROCEDURE EVALUATION
Procedure(s):  COLONOSCOPY WITH MAC; polypectomy. MAC    Anesthesia Post Evaluation        Comments: Post-Anesthesia Evaluation and Assessment    Cardiovascular Function/Vital Signs  BP (!) 113/56   Pulse 65   Temp 36.7 °C (98.1 °F)   Resp 14   Ht 4' 10\" (1.473 m)   Wt 63.9 kg (140 lb 12.8 oz)   SpO2 98%   Breastfeeding No   BMI 29.43 kg/m²     Patient is status post Procedure(s):  COLONOSCOPY WITH MAC; polypectomy. Nausea/Vomiting: Controlled. Postoperative hydration reviewed and adequate. Pain:  Pain Scale 1: Numeric (0 - 10) (06/03/22 1416)  Pain Intensity 1: 0 (06/03/22 1416)   Managed. Neurological Status: At baseline. Mental Status and Level of Consciousness: Arousable. Pulmonary Status:   O2 Device: None (Room air) (06/03/22 1416)   Adequate oxygenation and airway patent. Complications related to anesthesia: None    Post-anesthesia assessment completed. No concerns. Patient has met all discharge requirements.     Signed By: Roopa Lopez MD    Laura 3, 2022                   INITIAL Post-op Vital signs:   Vitals Value Taken Time   /56 06/03/22 1416   Temp 36.7 °C (98.1 °F) 06/03/22 1416   Pulse 65 06/03/22 1416   Resp 14 06/03/22 1416   SpO2 98 % 06/03/22 1416

## 2022-06-03 NOTE — DISCHARGE INSTRUCTIONS
Antonette Wise  [id number]  1980    COLON DISCHARGE INSTRUCTIONS    Discomfort:  Redness at IV site- apply warm compress to area; if redness or soreness persist- contact your physician  There may be a slight amount of blood passed from the rectum  Gaseous discomfort- walking, belching will help relieve any discomfort  You should not operate a vehicle for 12 hours  You should not engage in an occupation involving machinery or appliances for rest of today  You may not drink alcoholic beverages for at least 12 hours  Avoid making any critical decisions for at least 24 hour  DIET:   Regular diet. - however -  remember your colon is empty and a heavy meal will produce gas. Avoid these foods:  vegetables, fried / greasy foods, carbonated drinks for today     ACTIVITY:  You may resume your normal daily activities it is recommended that you spend the remainder of the day resting -  avoid any strenuous activity. CALL M.D. ANY SIGN OF:   Increasing pain, nausea, vomiting  Abdominal distension (swelling)  New increased bleeding (oral or rectal)  Fever (chills)  Pain in chest area  Bloody discharge from nose or mouth  Shortness of breath      Follow-up Instructions: Will call with polyp pathology results and when next colonoscopy due. Antonette Wise  [id number]  1980        DISCHARGE SUMMARY from Nurse    The following personal items collected during your admission are returned to you:   Dental Appliance: Dental Appliances: None  Vision: Visual Aid: None  Hearing Aid:    Jewelry:    Clothing:    Other Valuables:    Valuables sent to safe:      {Medication reconciliation information is now added to the patient's AVS automatically when it is printed. There is no need to use this SmartLink in discharge instructions.   Highlight this text and delete it to clear this message}      DISCHARGE SUMMARY from Nurse    PATIENT INSTRUCTIONS:    After general anesthesia or intravenous sedation, for 24 hours or while taking prescription Narcotics:  · Limit your activities  · Do not drive and operate hazardous machinery  · Do not make important personal or business decisions  · Do  not drink alcoholic beverages  · If you have not urinated within 8 hours after discharge, please contact your surgeon on call. Report the following to your surgeon:  · Excessive pain, swelling, redness or odor of or around the surgical area  · Temperature over 100.5  · Nausea and vomiting lasting longer than 4 hours or if unable to take medications  · Any signs of decreased circulation or nerve impairment to extremity: change in color, persistent  numbness, tingling, coldness or increase pain  · Any questions    What to do at Home:  Recommended activity: Activity as tolerated, as above    If you experience any of the following symptoms as above, please follow up with Dr. Lev Beasley. *  Please give a list of your current medications to your Primary Care Provider. *  Please update this list whenever your medications are discontinued, doses are      changed, or new medications (including over-the-counter products) are added. *  Please carry medication information at all times in case of emergency situations. These are general instructions for a healthy lifestyle:    No smoking/ No tobacco products/ Avoid exposure to second hand smoke  Surgeon General's Warning:  Quitting smoking now greatly reduces serious risk to your health. Obesity, smoking, and sedentary lifestyle greatly increases your risk for illness    A healthy diet, regular physical exercise & weight monitoring are important for maintaining a healthy lifestyle    You may be retaining fluid if you have a history of heart failure or if you experience any of the following symptoms:  Weight gain of 3 pounds or more overnight or 5 pounds in a week, increased swelling in our hands or feet or shortness of breath while lying flat in bed.   Please call your doctor as soon as you notice any of these symptoms; do not wait until your next office visit. The discharge information has been reviewed with the patient and spouse. The patient and spouse verbalized understanding. Discharge medications reviewed with the patient and appropriate educational materials and side effects teaching were provided.   ___________________________________________________________________________________________________________________________________  Patient armband removed and shredded

## 2022-06-11 NOTE — PROCEDURES
Colonoscopy Procedure Note     Indications: h/o adenomatous polyps     Anesthesia/Sedation: MAC anesthesia Propofol     Pre-Procedure Exam:  Airway: clear   Heart: normal S1and S2    Lungs: clear bilateral  Abdomen: soft, nontender, bowel sounds present and normal in all quadrants   Mental Status: awake, alert, and oriented to person, place, and time      Procedure in Detail:  Informed consent was obtained for the procedure, including sedation. Risks of perforation, hemorrhage, adverse drug reaction, and aspiration were discussed. The patient was placed in the left lateral decubitus position. Based on the pre-procedure assessment, including review of the patient's medical history, medications, allergies, and review of systems, she had been deemed to be an appropriate candidate for moderate sedation; she was therefore sedated with the medications listed above. The patient was monitored continuously with ECG tracing, pulse oximetry, blood pressure monitoring, and direct observations. A rectal examination was performed. The [id number] was inserted into the rectum and advanced under direct vision to the terminal ileum. The quality of the colonic preparation was excellent. A careful inspection was made as the colonoscope was withdrawn, including a retroflexed view of the rectum; findings and interventions are described below. Appropriate photodocumentation was obtained.     ANUS: Anal exam reveals no masses or hemorrhoids, sphincter tone is normal.   RECTUM: Rectal exam reveals no masses or hemorrhoids. 5mm polyp removed with cold snare  SIGMOID COLON: The mucosa is normal with good vascular pattern and without ulcers, diverticula, and polyps. DESCENDING COLON: The mucosa is normal with good vascular pattern and without ulcers, diverticula, and polyps.    SPLENIC FLEXURE: The splenic flexure is normal.   TRANSVERSE COLON: The mucosa is normal with good vascular pattern and without ulcers, diverticula, and polyps. HEPATIC FLEXURE: The hepatic flexure is normal.   ASCENDING COLON: The mucosa is normal with good vascular pattern and without ulcers, diverticula, and polyps. CECUM: pt with ileocecectomy with side anastomosis   TERMINAL ILEUM: The terminal ileum was entered.    Specimens: Specimens were collected and sent to pathology.        EBL: None     No prosthetic devices, grafts, tissues, transplant or devices implanted.     Withdrawal Time: 10  min     Complications: None; patient tolerated the procedure well.     Attending Attestation: I performed the procedure.     Recommendations:   - Await pathology.     Signed By: Pam Naranjo MD                      6/3/2022

## 2022-09-13 NOTE — PROGRESS NOTES
1. Have you been to the ER, urgent care clinic since your last visit? Hospitalized since your last visit? yes, 2 weeks ago for dehydration     2. Have you seen or consulted any other health care providers outside of the [address] [place] [address] since your last visit? Include any pap smears or colon screening.  No        Chief Complaint   Patient presents with    Follow-up [Neck] : neck [Lower back] : lower back [3] : 3 [1] : 2 [Dull/Aching] : dull/aching [Radiating] : radiating [Tingling] : tingling [Squeezing] : squeezing [Constant] : constant [Sleep] : sleep [Meds] : meds [Injection therapy] : injection therapy [] : no [FreeTextEntry1] : b/l neck and back  [FreeTextEntry6] : weakness, numbness, clicking  [FreeTextEntry7] : b/l shoulders, arms to the fingers, b/l legs to the foot  [FreeTextEntry9] : stretching, diclofenac  [de-identified] : lifting, using arms  [de-identified] : C MRI

## 2023-03-15 NOTE — NURSE NAVIGATOR
Per MBSAQIP requirements:  Letter and email sent requesting follow up appointment. [facility]  [address]   [address], [address]                 [facility] [place]  [facility]  [facility]      Dear Patient,    Your health is our main concern. It is important for your health to have follow-up lab work and to see your surgeon at 3 months, 6 months, 9 months and annually after your weight loss surgery. Additionally, the Department of Bariatric Surgery at our hospital is a member of the Metabolic and Bariatric Surgery Accreditation and Quality Improvement Program [facility]). As a participant in this program, we gather information on the outcomes of our patients after surgery. Please call the office for a follow up appointment at [phone]. If you have moved out of the area or have changed surgeons please call us and let us know the name of your doctor. Your health and feedback are important to us. We greatly appreciate your response.        Thank you,  [facility] [address] [facility]

## 2023-05-24 ENCOUNTER — HOSPITAL ENCOUNTER (OUTPATIENT)
Facility: HOSPITAL | Age: 43
Discharge: HOME | End: 2023-05-27
Payer: OTHER GOVERNMENT

## 2023-05-24 ENCOUNTER — OFFICE VISIT (OUTPATIENT)
Age: 43
End: 2023-05-24
Payer: OTHER GOVERNMENT

## 2023-05-24 VITALS
OXYGEN SATURATION: 100 % | SYSTOLIC BLOOD PRESSURE: 103 MMHG | TEMPERATURE: 97.2 F | HEIGHT: 58 IN | BODY MASS INDEX: 31.34 KG/M2 | WEIGHT: 149.3 LBS | HEART RATE: 64 BPM | DIASTOLIC BLOOD PRESSURE: 72 MMHG

## 2023-05-24 DIAGNOSIS — Z98.84 S/P GASTRIC BYPASS: ICD-10-CM

## 2023-05-24 DIAGNOSIS — K90.9 INTESTINAL MALABSORPTION, UNSPECIFIED TYPE: Primary | ICD-10-CM

## 2023-05-24 DIAGNOSIS — K90.9 INTESTINAL MALABSORPTION, UNSPECIFIED TYPE: ICD-10-CM

## 2023-05-24 LAB
25(OH)D3 SERPL-MCNC: 31.1 NG/ML (ref 30–100)
FOLATE SERPL-MCNC: >20 NG/ML (ref 3.1–17.5)
VIT B12 SERPL-MCNC: 460 PG/ML (ref 211–911)

## 2023-05-24 PROCEDURE — 82746 ASSAY OF FOLIC ACID SERUM: CPT

## 2023-05-24 PROCEDURE — 36415 COLL VENOUS BLD VENIPUNCTURE: CPT

## 2023-05-24 PROCEDURE — 82607 VITAMIN B-12: CPT

## 2023-05-24 PROCEDURE — 99214 OFFICE O/P EST MOD 30 MIN: CPT | Performed by: SPECIALIST

## 2023-05-24 PROCEDURE — 82306 VITAMIN D 25 HYDROXY: CPT

## 2023-05-24 PROCEDURE — 84425 ASSAY OF VITAMIN B-1: CPT

## 2023-05-24 RX ORDER — HYDROCORTISONE ACETATE 25 MG/1
SUPPOSITORY RECTAL
COMMUNITY
Start: 2023-02-01

## 2023-05-24 RX ORDER — LIDOCAINE 50 MG/G
OINTMENT TOPICAL
COMMUNITY
Start: 2023-02-21

## 2023-05-24 RX ORDER — LIDOCAINE AND PRILOCAINE 25; 25 MG/G; MG/G
CREAM TOPICAL
COMMUNITY
Start: 2023-02-22

## 2023-05-24 RX ORDER — PANCRELIPASE 36000; 180000; 114000 [USP'U]/1; [USP'U]/1; [USP'U]/1
CAPSULE, DELAYED RELEASE PELLETS ORAL
COMMUNITY
Start: 2023-05-05

## 2023-05-24 RX ORDER — CYCLOBENZAPRINE HCL 5 MG
TABLET ORAL EVERY 8 HOURS PRN
COMMUNITY
Start: 2022-05-25

## 2023-05-24 RX ORDER — NIFEDIPINE, MICRONIZED 100 %
POWDER (GRAM) MISCELLANEOUS
COMMUNITY
Start: 2023-05-18

## 2023-05-24 RX ORDER — DOCUSATE CALCIUM 240 MG
CAPSULE ORAL
COMMUNITY
Start: 2023-02-01

## 2023-05-24 RX ORDER — POLYETHYLENE GLYCOL 3350 17 G/17G
POWDER, FOR SOLUTION ORAL
COMMUNITY
Start: 2023-05-18

## 2023-05-24 ASSESSMENT — PATIENT HEALTH QUESTIONNAIRE - PHQ9
1. LITTLE INTEREST OR PLEASURE IN DOING THINGS: 0
SUM OF ALL RESPONSES TO PHQ QUESTIONS 1-9: 0
SUM OF ALL RESPONSES TO PHQ QUESTIONS 1-9: 0
2. FEELING DOWN, DEPRESSED OR HOPELESS: 0
SUM OF ALL RESPONSES TO PHQ QUESTIONS 1-9: 0
SUM OF ALL RESPONSES TO PHQ QUESTIONS 1-9: 0
SUM OF ALL RESPONSES TO PHQ9 QUESTIONS 1 & 2: 0

## 2023-05-24 NOTE — PROGRESS NOTES
194 01/08/2021 12:49 PM    MPV 10.1 01/08/2021 12:49 PM      VITAMIN D:   Lab Results   Component Value Date/Time    VITD25 38.3 01/08/2021 12:51 PM     VITAMIN B12 AND FOLATE:   Lab Results   Component Value Date/Time    URYNDMVS53 305 01/08/2021 12:49 PM    FOLATE >20.0 01/08/2021 12:49 PM     IRON:   Lab Results   Component Value Date/Time    IRON 100 01/08/2021 12:49 PM     FERRITIN:   Lab Results   Component Value Date/Time    FERRITIN 8 01/08/2021 12:49 PM     VITAMIN B1:   Lab Results   Component Value Date/Time    EOWA8AMEVFD 87.6 01/08/2021 12:49 PM               Assessment and Plan:   Intestinal malabsorption  continue required Vitamins: B12, B complex, D, iron, calcium, multivitamin  S/p laparoscopic bariatric surgery,laparoscopic gastric bypass surgery , history of morbid obesity. Now has pancreatic insuff of unclear etiology that started 5 months ago, not related to her gastric bypass. Sleep goal is 7-9 hours each night. Patient education given on the effects of sleep deprivation on weight control. Discussed patients weight loss goals and dietary choices in relation to goals. Reminded to measure portions, continue high protein, low carbohydrate diet. Reminded to eat regularly, to eat slowly & not to drink with meals. Continue cardio exercise and add resistance exercises. 60-90 minutes of aerobic activity 5 days a week and strength training 2 days each week. Encouraged to attend support group   Required fluid intake is >64oz daily of decaffeinated sugar free beverages. Patient to complete labs before next visit. Lab slip given today.   I have discussed this plan with patient and they verbalized understanding    30 minutes spent with patient    Follow up in 1 years or sooner if patient

## 2023-05-24 NOTE — PATIENT INSTRUCTIONS
Patient Instructions      1. Continue to monitor carbohydrate and protein intake- remember to keep your           total  carbohydrates to 50 grams or less per day for best results. 2. Remember hydration goals - usually 48 to 64 ounces of liquids per day  3. Continue to work towards exercise goals - minimum 3 days per week of 45          minutes to  1 hour at a time. 4. Remember to take vitamins as directed        Supplement Resource Guide    Importance of Protein:   Maintains lean body mass, produces antibodies to fight off infections, heals wounds, minimizes hair loss, helps to give you energy, helps with satiety, and keeping you full between meals. Importance of Calcium:  Needed for healthy bones and teeth, normal blood clotting, and nervous system functioning, higher risk of osteoporosis and bone disease with non-compliance. Importance of Multivitamins: Many functions. Supply you with extra nutrients that you may be missing from food. May lead to iron deficiency anemia, weakness, fatigue, and many other symptoms with non-compliance. Importance of B Vitamins:  Important for red blood cell formation, metabolism, energy, and helps to maintain a healthy nervous system. Protein Supplement  Find one you like now. Use immediately after surgery. Look for:  35-50g protein each day from your protein supplement once you reach the progression diet. 0-3 g fat per serving  0-3 g sugar per serving    Protein drinks should be split in separate dosages. Recommend: Lifelong  1 year + Calcium Supplement:     Start taking within a month after surgery. Look for: Calcium Citrate Plus D (1500 mg per day)  Recommend: Citracal     .          Avoid chocolate chewable calcium. Can use chewable bariatric or [employer] brand or similar chewable. The body cannot absorb more than 500-600 mg @ a time.       Take for Life Multi-vitamin Supplement:      1st Month Satisfactory

## 2023-05-26 LAB — VIT B1 BLD-SCNC: 71.8 NMOL/L (ref 66.5–200)

## 2025-04-14 NOTE — ROUTINE PROCESS
Outpatient Clinic Visit Note    Patient: Brittany Negrete  : 1952 (72 y.o.)  Date: 2025    CC:  Chief Complaint   Patient presents with    Follow-up     4 Month follow up   Medication changes   Discuss RX for Adipex         HPI: she had stomach upset; was referred to GI and had EGD, was told she had H. Pylori.  She did not tolerate the antibiotics well; it took her three weeks to complete them.  Dr Roman told her to stop the ozempic and she got better.  When her stomach bothered her, her abdominal pain went from the left to the right.    She very rarely vomited before the ozempic was stopped.     Past Medical History:    Past Medical History:   Diagnosis Date    Anxiety     H/O echocardiogram 2020    EF 55-60%, Mild MR, TR & LVH.    History of nuclear stress test 2020    EF 69%, ABN, Mild degree of lateral wall ischemia, Exercise induced frequent PVCs and ventricular couplets noted    Hx of exercise stress test 2021    Treadmill    Hyperlipidemia     Hypertension     Nocturia     Obesity     Sleep apnea     Urinary incontinence        Past Surgical History:  Past Surgical History:   Procedure Laterality Date    CARPAL TUNNEL RELEASE Bilateral     CYSTOSCOPY  10/11/2024    with Botox Injection to Bladder    FRACTURE SURGERY      Left ankle     HERNIA REPAIR      [facility] (Dr Fierro)    TUBAL LIGATION         Home Medications:  Current Outpatient Medications   Medication Sig Dispense Refill    triamterene-hydroCHLOROthiazide (DYAZIDE) 37.5-25 MG per capsule Take 1 capsule by mouth daily 90 capsule 0    phentermine 37.5 MG capsule Take 1 capsule by mouth every morning for 30 days. Max Daily Amount: 37.5 mg 30 capsule 0    linagliptin (TRADJENTA) 5 MG tablet Take 1 tablet by mouth daily 30 tablet 5    pantoprazole (PROTONIX) 40 MG tablet Take 1 tablet by mouth daily      estradiol (ESTRACE) 0.1 MG/GM vaginal cream Place 42.5 g vaginally See Admin Instructions       TRANSFER - IN REPORT:    Verbal report received from Kelly Tristan RN (name) on Tania Graff  being received from PACU (unit) for routine progression of care      Report consisted of patients Situation, Background, Assessment and   Recommendations(SBAR). Information from the following report(s) SBAR, Kardex, Intake/Output and MAR was reviewed with the receiving nurse. Opportunity for questions and clarification was provided. Assessment completed upon patients arrival to unit and care assumed.

## (undated) DEVICE — TRAP SPEC COLL POLYP POLYSTYR --

## (undated) DEVICE — NDL PRT INJ NSAF BLNT 18GX1.5 --

## (undated) DEVICE — ENDO CARRY-ON PROCEDURE KIT INCLUDES ENZYMATIC SPONGE, GAUZE, BIOHAZARD LABEL, TRAY, LUBRICANT, DIRTY SCOPE LABEL, WATER LABEL, TRAY, DRAWSTRING PAD, AND DEFENDO 4-PIECE KIT.: Brand: ENDO CARRY-ON PROCEDURE KIT

## (undated) DEVICE — 4-PORT MANIFOLD: Brand: NEPTUNE 2

## (undated) DEVICE — SUT SLK 2-0SH 30IN BLK --

## (undated) DEVICE — SUT MONOCRYL PLUS UD 4-0 --

## (undated) DEVICE — KENDALL RADIOLUCENT FOAM MONITORING ELECTRODE RECTANGULAR SHAPE: Brand: KENDALL

## (undated) DEVICE — TROCAR LAP L100MM DIA5MM BLDELSS W/ STBL SL ENDOPATH XCEL

## (undated) DEVICE — SET ADMIN 16ML TBNG L100IN 2 Y INJ SITE IV PIGGY BK DISP

## (undated) DEVICE — PREP SKN PREVAIL 40ML APPL --

## (undated) DEVICE — AIRLIFE™ NASAL OXYGEN CANNULA CURVED, FLARED TIP WITH 14 FOOT (4.3 M) CRUSH-RESISTANT TUBING, OVER-THE-EAR STYLE: Brand: AIRLIFE™

## (undated) DEVICE — SINGLE PORT MANIFOLD: Brand: NEPTUNE 2

## (undated) DEVICE — SYR 50ML SLIP TIP NSAF LF STRL --

## (undated) DEVICE — DRAIN SURG 15FR L3/16IN SIL RND 3/4 FLUT 3/16IN TRCR

## (undated) DEVICE — STRIP SKIN CLSR W1XL5IN NYL REINF CURAD

## (undated) DEVICE — MOUTHPIECE ENDOSCP 20X27MM --

## (undated) DEVICE — NEEDLE INSUF L150MM DIA2MM DISP FOR PNEUMOPERI ENDOPATH

## (undated) DEVICE — MEDI-VAC SUCTION HANDLE REGULAR CAPACITY: Brand: CARDINAL HEALTH

## (undated) DEVICE — MEDI-VAC NON-CONDUCTIVE SUCTION TUBING: Brand: CARDINAL HEALTH

## (undated) DEVICE — CATH IV SAFE STR 22GX1IN BLU -- PROTECTIV PLUS

## (undated) DEVICE — ENDOCUT SCISSOR TIP, DISPOSABLE: Brand: RENEW

## (undated) DEVICE — SYR 3ML LL TIP 1/10ML GRAD --

## (undated) DEVICE — RELOAD STPL L60MM H1-2.6MM MESENTERY THN TISS WHT 6 ROW

## (undated) DEVICE — TRNQT TEXT 1X18IN BLU LF DISP -- CONVERT TO ITEM 362165

## (undated) DEVICE — TROCAR ENDOSCP L100MM DIA12MM STBL SL BLDELSS ENDOPATH XCEL

## (undated) DEVICE — DEVON™ KNEE AND BODY STRAP 60" X 3" (1.5 M X 7.6 CM): Brand: DEVON

## (undated) DEVICE — ESOPHAGEAL/COLONIC/BILIARY WIREGUIDED BALLOON DILATATION CATHETER: Brand: CRE™ PRO

## (undated) DEVICE — AMD ANTIMICROBIAL DRAIN SPONGES, 6 PLY, 0.2% POLYHEXAMETHYLENE BIGUANIDE HCI (PHMB): Brand: EXCILON

## (undated) DEVICE — TRAY CATH OD16FR SIL URIN M STATLOK STBL DEV SURSTP

## (undated) DEVICE — RELOAD STPL L60MM H1.5-3.6MM REG TISS BLU GRIPPING SURF B

## (undated) DEVICE — TROCAR: Brand: KII® SLEEVE

## (undated) DEVICE — COVER LT HNDL PLAS RIG 2 PER PK

## (undated) DEVICE — SUTURE PDS + SZ 1 L96IN ABSRB VLT L65MM TP-1 1/2 CIR PDP880G

## (undated) DEVICE — SYR IRR CATH TIP LR ADPT 70ML -- CONVERT TO ITEM 363120

## (undated) DEVICE — SOL IRRIGATION INJ NACL 0.9% 500ML BTL

## (undated) DEVICE — CLIP SUT ENDOSCP F/2-0/3-0/4-0 -- LAPRA-TY

## (undated) DEVICE — MAJ-1414 SINGLE USE ADPATER BIOPSY VALV: Brand: SINGLE USE ADAPTOR BIOPSY VALVE

## (undated) DEVICE — SHEAR HARMONIC 5MMX45CM -- ACE 7+

## (undated) DEVICE — VISUALIZATION SYSTEM: Brand: CLEARIFY

## (undated) DEVICE — [HIGH FLOW INSUFFLATOR,  DO NOT USE IF PACKAGE IS DAMAGED,  KEEP DRY,  KEEP AWAY FROM SUNLIGHT,  PROTECT FROM HEAT AND RADIOACTIVE SOURCES.]: Brand: PNEUMOSURE

## (undated) DEVICE — Device

## (undated) DEVICE — SUT PROL 2-0 30IN CT2 BLU --

## (undated) DEVICE — GOWN,SIRUS,NONRNF,SETINSLV,XL,20/CS: Brand: MEDLINE

## (undated) DEVICE — GARMENT,MEDLINE,DVT,INT,CALF,MED, GEN2: Brand: MEDLINE

## (undated) DEVICE — CUTTER ENDOSCP L340MM LIN ARTC SGL STROKE FIRING ENDOPATH

## (undated) DEVICE — ALL PURPOSE SPONGES,NON-WOVEN, 4 PLY: Brand: CURITY

## (undated) DEVICE — STERILE POLYISOPRENE POWDER-FREE SURGICAL GLOVES: Brand: PROTEXIS

## (undated) DEVICE — 3M™ STERI-STRIP™ REINFORCED ADHESIVE SKIN CLOSURES, R1548, 1 IN X 5 IN (25 MM X 125 MM), 4 STRIPS/ENVELOPE: Brand: 3M™ STERI-STRIP™

## (undated) DEVICE — GOWN ISOLATN REG BLU POLY UNISX W/ THMB LOOP

## (undated) DEVICE — BRUSH CYTO L240CM DIA2.3MM GI COLONOSCOPY 3 RNG HNDL RADPQ

## (undated) DEVICE — ESOPHAGEAL/PYLORIC/COLONIC/BILIARY WIREGUIDED BALLOON DILATATION CATHETER: Brand: CRE™ PRO

## (undated) DEVICE — FORCEPS BX CAP 240CM L RAD JAW 4

## (undated) DEVICE — TIP APPL L35CM RIG FOR SEAL EVICEL

## (undated) DEVICE — BARIATRIC: Brand: MEDLINE INDUSTRIES, INC.

## (undated) DEVICE — INTENDED FOR TISSUE SEPARATION, AND OTHER PROCEDURES THAT REQUIRE A SHARP SURGICAL BLADE TO PUNCTURE OR CUT.: Brand: BARD-PARKER SAFETY BLADES SIZE 15, STERILE

## (undated) DEVICE — KENDALL SCD EXPRESS SLEEVES, KNEE LENGTH, MEDIUM: Brand: KENDALL SCD

## (undated) DEVICE — SOLUTION LACTATED RINGERS INJECTION USP

## (undated) DEVICE — STAPLER INT L75MM CUT LN L73MM STPL LN L77MM BLU B FRM 8

## (undated) DEVICE — APPLIER CLP L L13IN TI MULT RNG HNDL 20 CLP STR LIGACLP

## (undated) DEVICE — EJECTOR SALIVA 6 IN FLX CLR

## (undated) DEVICE — SUTURE ETHIB EXCL BR GRN TAPR PT 2-0 30 X563H X563H

## (undated) DEVICE — SUTURE PERMAHAND SZ 2-0 L30IN NONABSORBABLE BLK SILK W/O A305H

## (undated) DEVICE — AGENT HEMSTAT W6XL9IN OXIDIZED REGENERATED CELOS ABSRB FOR

## (undated) DEVICE — SUTURE PDS II SZ 0 L27IN ABSRB VLT L26MM CT-2 1/2 CIR Z334H

## (undated) DEVICE — CATHETER JEJUSTMY AD 16FR 15CC L108IN THMB VLV FOR DCOMPR

## (undated) DEVICE — STAPLER INT L37CM STPL 21MM CIR ENDOSCP CRV INTLUMN B FRM

## (undated) DEVICE — FORCEPS BX OVL CUP SERR DISP CAP L 240CM RAD JAW 4

## (undated) DEVICE — STAPLER SKIN L440MM 32MM LNG 12 FIRING B FRM PWR + GRIPPING

## (undated) DEVICE — APPLIER CLP L SHFT DIA12MM 20 ROT MULT LIGACLP

## (undated) DEVICE — TISSUE RETRIEVAL SYSTEM: Brand: INZII RETRIEVAL SYSTEM

## (undated) DEVICE — TROCAR ENDOSCP L100MM DIA15MM BLDELSS STBL SL ENDOPATH XCEL

## (undated) DEVICE — SLEEVE TRCR L100MM DIA5MM UNIV STBL FOR BLDELSS DIL TIP

## (undated) DEVICE — INSUFFLATION NEEDLE TO ESTABLISH PNEUMOPERITONEUM.: Brand: INSUFFLATION NEEDLE

## (undated) DEVICE — RELOAD STPL L75MM OPN H3.8MM CLS 1.5MM WIRE DIA0.2MM REG

## (undated) DEVICE — LAPAROSCOPIC TROCAR SLEEVE/SINGLE USE: Brand: KII® OPTICAL ACCESS SYSTEM

## (undated) DEVICE — STAPLER INT L60MM REG TISS BLU B FRM 8 FIRING 2 ROW AUTO

## (undated) DEVICE — SPONGE GZ W4XL4IN RAYON POLY 4 PLY NONWOVEN FASTER WICKING

## (undated) DEVICE — SUTURE ETHLN SZ 3-0 L30IN NONABSORBABLE BLK FSL L30MM 3/8 1671H

## (undated) DEVICE — RELOAD STPL H1-2.5X45MM VASC THN TISS WHT 6 ROW B FRM SGL

## (undated) DEVICE — TROCAR ENDOSCP BLDELSS 12X100 MM W/ HNDL STBL SL OPT TIP

## (undated) DEVICE — SPONGE LAP 18X18IN STRL -- 5/PK

## (undated) DEVICE — BLADE ELECTRODE: Brand: EDGE

## (undated) DEVICE — SEALANT HEMSTAT 5ML HUM FIBRIN THROM 2 VI APPL DEV EVICEL